# Patient Record
Sex: FEMALE | Race: WHITE | NOT HISPANIC OR LATINO | Employment: UNEMPLOYED | URBAN - METROPOLITAN AREA
[De-identification: names, ages, dates, MRNs, and addresses within clinical notes are randomized per-mention and may not be internally consistent; named-entity substitution may affect disease eponyms.]

---

## 2023-06-28 ENCOUNTER — HOSPITAL ENCOUNTER (INPATIENT)
Facility: HOSPITAL | Age: 58
LOS: 9 days | Discharge: NURSING, REHAB OR LONG-TERM CARE | DRG: 353 | End: 2023-07-07
Attending: EMERGENCY MEDICINE | Admitting: SURGERY
Payer: MEDICAID

## 2023-06-28 ENCOUNTER — ANESTHESIA (OUTPATIENT)
Dept: SURGERY | Facility: HOSPITAL | Age: 58
DRG: 353 | End: 2023-06-28
Payer: MEDICAID

## 2023-06-28 ENCOUNTER — ANESTHESIA EVENT (OUTPATIENT)
Dept: SURGERY | Facility: HOSPITAL | Age: 58
DRG: 353 | End: 2023-06-28
Payer: MEDICAID

## 2023-06-28 DIAGNOSIS — R07.9 CHEST PAIN: ICD-10-CM

## 2023-06-28 DIAGNOSIS — K42.0 INCARCERATED UMBILICAL HERNIA: ICD-10-CM

## 2023-06-28 DIAGNOSIS — F41.9 ANXIETY: ICD-10-CM

## 2023-06-28 DIAGNOSIS — N28.9 ACUTE RENAL INSUFFICIENCY: Primary | ICD-10-CM

## 2023-06-28 DIAGNOSIS — K43.6 INCARCERATED VENTRAL HERNIA: ICD-10-CM

## 2023-06-28 DIAGNOSIS — J69.0 ASPIRATION PNEUMONIA OF BOTH LOWER LOBES DUE TO GASTRIC SECRETIONS: ICD-10-CM

## 2023-06-28 LAB
ABO + RH BLD: NORMAL
ALBUMIN SERPL BCP-MCNC: 3.1 G/DL (ref 3.5–5.2)
ALP SERPL-CCNC: 133 U/L (ref 55–135)
ALT SERPL W/O P-5'-P-CCNC: 43 U/L (ref 10–44)
ANION GAP SERPL CALC-SCNC: 19 MMOL/L (ref 8–16)
APTT PPP: 24.8 SEC (ref 21–32)
AST SERPL-CCNC: 26 U/L (ref 10–40)
BASOPHILS # BLD AUTO: 0.06 K/UL (ref 0–0.2)
BASOPHILS NFR BLD: 0.4 % (ref 0–1.9)
BILIRUB SERPL-MCNC: 1 MG/DL (ref 0.1–1)
BLD GP AB SCN CELLS X3 SERPL QL: NORMAL
BUN SERPL-MCNC: 50 MG/DL (ref 6–20)
CALCIUM SERPL-MCNC: 10.6 MG/DL (ref 8.7–10.5)
CHLORIDE SERPL-SCNC: 91 MMOL/L (ref 95–110)
CO2 SERPL-SCNC: 22 MMOL/L (ref 23–29)
CREAT SERPL-MCNC: 1.5 MG/DL (ref 0.5–1.4)
DIFFERENTIAL METHOD: ABNORMAL
EOSINOPHIL # BLD AUTO: 0.1 K/UL (ref 0–0.5)
EOSINOPHIL NFR BLD: 0.4 % (ref 0–8)
ERYTHROCYTE [DISTWIDTH] IN BLOOD BY AUTOMATED COUNT: 17 % (ref 11.5–14.5)
EST. GFR  (NO RACE VARIABLE): 40 ML/MIN/1.73 M^2
GLUCOSE SERPL-MCNC: 141 MG/DL (ref 70–110)
HCT VFR BLD AUTO: 51.9 % (ref 37–48.5)
HGB BLD-MCNC: 17.1 G/DL (ref 12–16)
IMM GRANULOCYTES # BLD AUTO: 0.06 K/UL (ref 0–0.04)
IMM GRANULOCYTES NFR BLD AUTO: 0.4 % (ref 0–0.5)
INR PPP: 1.1 (ref 0.8–1.2)
LACTATE SERPL-SCNC: 1.6 MMOL/L (ref 0.5–2.2)
LIPASE SERPL-CCNC: 19 U/L (ref 4–60)
LYMPHOCYTES # BLD AUTO: 2.2 K/UL (ref 1–4.8)
LYMPHOCYTES NFR BLD: 15.7 % (ref 18–48)
MCH RBC QN AUTO: 26.4 PG (ref 27–31)
MCHC RBC AUTO-ENTMCNC: 32.9 G/DL (ref 32–36)
MCV RBC AUTO: 80 FL (ref 82–98)
MONOCYTES # BLD AUTO: 1.1 K/UL (ref 0.3–1)
MONOCYTES NFR BLD: 7.8 % (ref 4–15)
NEUTROPHILS # BLD AUTO: 10.4 K/UL (ref 1.8–7.7)
NEUTROPHILS NFR BLD: 75.3 % (ref 38–73)
NRBC BLD-RTO: 0 /100 WBC
PLATELET # BLD AUTO: 486 K/UL (ref 150–450)
PMV BLD AUTO: 8.8 FL (ref 9.2–12.9)
POTASSIUM SERPL-SCNC: 4.4 MMOL/L (ref 3.5–5.1)
PROT SERPL-MCNC: 8 G/DL (ref 6–8.4)
PROTHROMBIN TIME: 11.8 SEC (ref 9–12.5)
RBC # BLD AUTO: 6.48 M/UL (ref 4–5.4)
SODIUM SERPL-SCNC: 132 MMOL/L (ref 136–145)
SPECIMEN OUTDATE: NORMAL
WBC # BLD AUTO: 13.84 K/UL (ref 3.9–12.7)

## 2023-06-28 PROCEDURE — 85730 THROMBOPLASTIN TIME PARTIAL: CPT | Performed by: EMERGENCY MEDICINE

## 2023-06-28 PROCEDURE — 49594 PR REPAIR ANT ABD HERNIA INITIAL 3-10 CM INCARC/STRANG: ICD-10-PCS | Mod: ,,, | Performed by: SURGERY

## 2023-06-28 PROCEDURE — 93010 EKG 12-LEAD: ICD-10-PCS | Mod: ,,, | Performed by: INTERNAL MEDICINE

## 2023-06-28 PROCEDURE — C1729 CATH, DRAINAGE: HCPCS | Performed by: SURGERY

## 2023-06-28 PROCEDURE — 85610 PROTHROMBIN TIME: CPT | Performed by: EMERGENCY MEDICINE

## 2023-06-28 PROCEDURE — 99285 EMERGENCY DEPT VISIT HI MDM: CPT | Mod: 25

## 2023-06-28 PROCEDURE — 99900035 HC TECH TIME PER 15 MIN (STAT)

## 2023-06-28 PROCEDURE — 99223 1ST HOSP IP/OBS HIGH 75: CPT | Mod: 57,,, | Performed by: SURGERY

## 2023-06-28 PROCEDURE — 80053 COMPREHEN METABOLIC PANEL: CPT | Performed by: EMERGENCY MEDICINE

## 2023-06-28 PROCEDURE — 25000003 PHARM REV CODE 250: Performed by: EMERGENCY MEDICINE

## 2023-06-28 PROCEDURE — 96361 HYDRATE IV INFUSION ADD-ON: CPT

## 2023-06-28 PROCEDURE — 36415 COLL VENOUS BLD VENIPUNCTURE: CPT | Performed by: SURGERY

## 2023-06-28 PROCEDURE — 83690 ASSAY OF LIPASE: CPT | Performed by: EMERGENCY MEDICINE

## 2023-06-28 PROCEDURE — 63600175 PHARM REV CODE 636 W HCPCS: Mod: JG

## 2023-06-28 PROCEDURE — 63600175 PHARM REV CODE 636 W HCPCS: Performed by: ANESTHESIOLOGY

## 2023-06-28 PROCEDURE — 71000033 HC RECOVERY, INTIAL HOUR: Performed by: SURGERY

## 2023-06-28 PROCEDURE — 88307 TISSUE EXAM BY PATHOLOGIST: CPT | Mod: 26,,, | Performed by: PATHOLOGY

## 2023-06-28 PROCEDURE — 25000003 PHARM REV CODE 250: Performed by: SURGERY

## 2023-06-28 PROCEDURE — 37000009 HC ANESTHESIA EA ADD 15 MINS: Performed by: SURGERY

## 2023-06-28 PROCEDURE — 96375 TX/PRO/DX INJ NEW DRUG ADDON: CPT

## 2023-06-28 PROCEDURE — 49594 RPR AA HRN 1ST 3-10 NCR/STRN: CPT | Mod: ,,, | Performed by: SURGERY

## 2023-06-28 PROCEDURE — 37000008 HC ANESTHESIA 1ST 15 MINUTES: Performed by: SURGERY

## 2023-06-28 PROCEDURE — 21400001 HC TELEMETRY ROOM

## 2023-06-28 PROCEDURE — 93005 ELECTROCARDIOGRAM TRACING: CPT

## 2023-06-28 PROCEDURE — 36000709 HC OR TIME LEV III EA ADD 15 MIN: Performed by: SURGERY

## 2023-06-28 PROCEDURE — 86900 BLOOD TYPING SEROLOGIC ABO: CPT | Performed by: EMERGENCY MEDICINE

## 2023-06-28 PROCEDURE — 93010 ELECTROCARDIOGRAM REPORT: CPT | Mod: ,,, | Performed by: INTERNAL MEDICINE

## 2023-06-28 PROCEDURE — 88307 PR  SURG PATH,LEVEL V: ICD-10-PCS | Mod: 26,,, | Performed by: PATHOLOGY

## 2023-06-28 PROCEDURE — 85025 COMPLETE CBC W/AUTO DIFF WBC: CPT | Performed by: EMERGENCY MEDICINE

## 2023-06-28 PROCEDURE — 88307 TISSUE EXAM BY PATHOLOGIST: CPT | Performed by: PATHOLOGY

## 2023-06-28 PROCEDURE — C1781 MESH (IMPLANTABLE): HCPCS | Performed by: SURGERY

## 2023-06-28 PROCEDURE — 63600175 PHARM REV CODE 636 W HCPCS: Performed by: SURGERY

## 2023-06-28 PROCEDURE — 96374 THER/PROPH/DIAG INJ IV PUSH: CPT

## 2023-06-28 PROCEDURE — 27201423 OPTIME MED/SURG SUP & DEVICES STERILE SUPPLY: Performed by: SURGERY

## 2023-06-28 PROCEDURE — P9045 ALBUMIN (HUMAN), 5%, 250 ML: HCPCS | Mod: JG

## 2023-06-28 PROCEDURE — 99223 PR INITIAL HOSPITAL CARE,LEVL III: ICD-10-PCS | Mod: 57,,, | Performed by: SURGERY

## 2023-06-28 PROCEDURE — 83605 ASSAY OF LACTIC ACID: CPT | Performed by: EMERGENCY MEDICINE

## 2023-06-28 PROCEDURE — 27000221 HC OXYGEN, UP TO 24 HOURS

## 2023-06-28 PROCEDURE — 25000003 PHARM REV CODE 250

## 2023-06-28 PROCEDURE — 63600175 PHARM REV CODE 636 W HCPCS: Performed by: EMERGENCY MEDICINE

## 2023-06-28 PROCEDURE — 36000708 HC OR TIME LEV III 1ST 15 MIN: Performed by: SURGERY

## 2023-06-28 PROCEDURE — 94761 N-INVAS EAR/PLS OXIMETRY MLT: CPT

## 2023-06-28 DEVICE — MESH VENTRALIGHT ST 4 X 6: Type: IMPLANTABLE DEVICE | Site: UMBILICAL | Status: FUNCTIONAL

## 2023-06-28 RX ORDER — KETOROLAC TROMETHAMINE 30 MG/ML
15 INJECTION, SOLUTION INTRAMUSCULAR; INTRAVENOUS EVERY 8 HOURS PRN
Status: DISCONTINUED | OUTPATIENT
Start: 2023-06-28 | End: 2023-06-28 | Stop reason: HOSPADM

## 2023-06-28 RX ORDER — HYDROMORPHONE HYDROCHLORIDE 2 MG/ML
1 INJECTION, SOLUTION INTRAMUSCULAR; INTRAVENOUS; SUBCUTANEOUS
Status: COMPLETED | OUTPATIENT
Start: 2023-06-28 | End: 2023-06-28

## 2023-06-28 RX ORDER — TIZANIDINE 4 MG/1
4 TABLET ORAL EVERY 8 HOURS
Status: DISCONTINUED | OUTPATIENT
Start: 2023-06-28 | End: 2023-06-30

## 2023-06-28 RX ORDER — OXYCODONE HYDROCHLORIDE 5 MG/1
10 TABLET ORAL EVERY 6 HOURS PRN
Status: DISCONTINUED | OUTPATIENT
Start: 2023-06-28 | End: 2023-06-29

## 2023-06-28 RX ORDER — ACETAMINOPHEN 500 MG
1000 TABLET ORAL EVERY 8 HOURS
Status: DISPENSED | OUTPATIENT
Start: 2023-06-28 | End: 2023-06-30

## 2023-06-28 RX ORDER — DEXAMETHASONE SODIUM PHOSPHATE 4 MG/ML
INJECTION, SOLUTION INTRA-ARTICULAR; INTRALESIONAL; INTRAMUSCULAR; INTRAVENOUS; SOFT TISSUE
Status: DISCONTINUED | OUTPATIENT
Start: 2023-06-28 | End: 2023-06-28

## 2023-06-28 RX ORDER — HYDROMORPHONE HYDROCHLORIDE 2 MG/ML
1 INJECTION, SOLUTION INTRAMUSCULAR; INTRAVENOUS; SUBCUTANEOUS EVERY 4 HOURS PRN
Status: DISCONTINUED | OUTPATIENT
Start: 2023-06-28 | End: 2023-06-28

## 2023-06-28 RX ORDER — ALBUMIN HUMAN 50 G/1000ML
SOLUTION INTRAVENOUS CONTINUOUS PRN
Status: DISCONTINUED | OUTPATIENT
Start: 2023-06-28 | End: 2023-06-28

## 2023-06-28 RX ORDER — ROCURONIUM BROMIDE 10 MG/ML
INJECTION, SOLUTION INTRAVENOUS
Status: DISCONTINUED | OUTPATIENT
Start: 2023-06-28 | End: 2023-06-28

## 2023-06-28 RX ORDER — FLUOXETINE HYDROCHLORIDE 20 MG/1
40 CAPSULE ORAL DAILY
Status: DISCONTINUED | OUTPATIENT
Start: 2023-06-28 | End: 2023-07-07 | Stop reason: HOSPADM

## 2023-06-28 RX ORDER — ALPRAZOLAM 0.25 MG/1
0.25 TABLET ORAL 3 TIMES DAILY
Status: DISCONTINUED | OUTPATIENT
Start: 2023-06-28 | End: 2023-07-07 | Stop reason: HOSPADM

## 2023-06-28 RX ORDER — SODIUM CHLORIDE, SODIUM LACTATE, POTASSIUM CHLORIDE, CALCIUM CHLORIDE 600; 310; 30; 20 MG/100ML; MG/100ML; MG/100ML; MG/100ML
INJECTION, SOLUTION INTRAVENOUS CONTINUOUS
Status: DISCONTINUED | OUTPATIENT
Start: 2023-06-28 | End: 2023-06-30

## 2023-06-28 RX ORDER — BUPIVACAINE HYDROCHLORIDE 2.5 MG/ML
INJECTION, SOLUTION EPIDURAL; INFILTRATION; INTRACAUDAL
Status: DISCONTINUED | OUTPATIENT
Start: 2023-06-28 | End: 2023-06-28 | Stop reason: HOSPADM

## 2023-06-28 RX ORDER — METOCLOPRAMIDE HYDROCHLORIDE 5 MG/ML
5 INJECTION INTRAMUSCULAR; INTRAVENOUS EVERY 6 HOURS PRN
Status: DISCONTINUED | OUTPATIENT
Start: 2023-06-28 | End: 2023-07-06

## 2023-06-28 RX ORDER — ONDANSETRON 2 MG/ML
4 INJECTION INTRAMUSCULAR; INTRAVENOUS
Status: COMPLETED | OUTPATIENT
Start: 2023-06-28 | End: 2023-06-28

## 2023-06-28 RX ORDER — DIPHENHYDRAMINE HCL 25 MG
25 CAPSULE ORAL EVERY 6 HOURS PRN
Status: DISCONTINUED | OUTPATIENT
Start: 2023-06-28 | End: 2023-07-07 | Stop reason: HOSPADM

## 2023-06-28 RX ORDER — KETAMINE HCL IN 0.9 % NACL 50 MG/5 ML
SYRINGE (ML) INTRAVENOUS
Status: DISCONTINUED | OUTPATIENT
Start: 2023-06-28 | End: 2023-06-28

## 2023-06-28 RX ORDER — ONDANSETRON 8 MG/1
8 TABLET, ORALLY DISINTEGRATING ORAL EVERY 8 HOURS PRN
Status: DISCONTINUED | OUTPATIENT
Start: 2023-06-28 | End: 2023-06-30

## 2023-06-28 RX ORDER — MONTELUKAST SODIUM 10 MG/1
10 TABLET ORAL NIGHTLY
Status: DISCONTINUED | OUTPATIENT
Start: 2023-06-28 | End: 2023-07-07 | Stop reason: HOSPADM

## 2023-06-28 RX ORDER — LIDOCAINE HYDROCHLORIDE 20 MG/ML
INJECTION, SOLUTION EPIDURAL; INFILTRATION; INTRACAUDAL; PERINEURAL
Status: DISCONTINUED | OUTPATIENT
Start: 2023-06-28 | End: 2023-06-28

## 2023-06-28 RX ORDER — SUCCINYLCHOLINE CHLORIDE 20 MG/ML
INJECTION INTRAMUSCULAR; INTRAVENOUS
Status: DISCONTINUED | OUTPATIENT
Start: 2023-06-28 | End: 2023-06-28

## 2023-06-28 RX ORDER — HYDROMORPHONE HYDROCHLORIDE 1 MG/ML
1 INJECTION, SOLUTION INTRAMUSCULAR; INTRAVENOUS; SUBCUTANEOUS EVERY 4 HOURS PRN
Status: DISCONTINUED | OUTPATIENT
Start: 2023-06-28 | End: 2023-06-29

## 2023-06-28 RX ORDER — SODIUM CHLORIDE 9 MG/ML
1000 INJECTION, SOLUTION INTRAVENOUS
Status: COMPLETED | OUTPATIENT
Start: 2023-06-28 | End: 2023-06-28

## 2023-06-28 RX ORDER — CEFAZOLIN SODIUM 1 G/3ML
INJECTION, POWDER, FOR SOLUTION INTRAMUSCULAR; INTRAVENOUS
Status: DISCONTINUED | OUTPATIENT
Start: 2023-06-28 | End: 2023-06-28

## 2023-06-28 RX ORDER — MIDAZOLAM HYDROCHLORIDE 1 MG/ML
INJECTION INTRAMUSCULAR; INTRAVENOUS
Status: DISCONTINUED | OUTPATIENT
Start: 2023-06-28 | End: 2023-06-28

## 2023-06-28 RX ORDER — HYDROMORPHONE HYDROCHLORIDE 2 MG/ML
0.2 INJECTION, SOLUTION INTRAMUSCULAR; INTRAVENOUS; SUBCUTANEOUS EVERY 5 MIN PRN
Status: DISCONTINUED | OUTPATIENT
Start: 2023-06-28 | End: 2023-06-28 | Stop reason: HOSPADM

## 2023-06-28 RX ORDER — FENTANYL CITRATE 50 UG/ML
INJECTION, SOLUTION INTRAMUSCULAR; INTRAVENOUS
Status: DISCONTINUED | OUTPATIENT
Start: 2023-06-28 | End: 2023-06-28

## 2023-06-28 RX ORDER — PHENYLEPHRINE HYDROCHLORIDE 10 MG/ML
INJECTION INTRAVENOUS
Status: DISCONTINUED | OUTPATIENT
Start: 2023-06-28 | End: 2023-06-28

## 2023-06-28 RX ORDER — CHLORHEXIDINE GLUCONATE ORAL RINSE 1.2 MG/ML
10 SOLUTION DENTAL 2 TIMES DAILY
Status: COMPLETED | OUTPATIENT
Start: 2023-06-28 | End: 2023-07-03

## 2023-06-28 RX ORDER — FAMOTIDINE 20 MG/1
20 TABLET, FILM COATED ORAL DAILY
Status: DISCONTINUED | OUTPATIENT
Start: 2023-06-28 | End: 2023-06-29

## 2023-06-28 RX ORDER — PROPOFOL 10 MG/ML
VIAL (ML) INTRAVENOUS
Status: DISCONTINUED | OUTPATIENT
Start: 2023-06-28 | End: 2023-06-28

## 2023-06-28 RX ORDER — ONDANSETRON 2 MG/ML
4 INJECTION INTRAMUSCULAR; INTRAVENOUS DAILY PRN
Status: DISCONTINUED | OUTPATIENT
Start: 2023-06-28 | End: 2023-06-28 | Stop reason: HOSPADM

## 2023-06-28 RX ORDER — ENOXAPARIN SODIUM 100 MG/ML
40 INJECTION SUBCUTANEOUS EVERY 24 HOURS
Status: DISCONTINUED | OUTPATIENT
Start: 2023-06-29 | End: 2023-06-29

## 2023-06-28 RX ORDER — ONDANSETRON HYDROCHLORIDE 2 MG/ML
INJECTION, SOLUTION INTRAMUSCULAR; INTRAVENOUS
Status: DISCONTINUED | OUTPATIENT
Start: 2023-06-28 | End: 2023-06-28

## 2023-06-28 RX ADMIN — HYDROMORPHONE HYDROCHLORIDE 1 MG: 1 INJECTION, SOLUTION INTRAMUSCULAR; INTRAVENOUS; SUBCUTANEOUS at 08:06

## 2023-06-28 RX ADMIN — PHENYLEPHRINE HYDROCHLORIDE 100 MCG: 10 INJECTION INTRAVENOUS at 12:06

## 2023-06-28 RX ADMIN — HYDROMORPHONE HYDROCHLORIDE 1 MG: 1 INJECTION, SOLUTION INTRAMUSCULAR; INTRAVENOUS; SUBCUTANEOUS at 04:06

## 2023-06-28 RX ADMIN — HYDROMORPHONE HYDROCHLORIDE 0.2 MG: 2 INJECTION INTRAMUSCULAR; INTRAVENOUS; SUBCUTANEOUS at 02:06

## 2023-06-28 RX ADMIN — ONDANSETRON 4 MG: 2 INJECTION INTRAMUSCULAR; INTRAVENOUS at 11:06

## 2023-06-28 RX ADMIN — FENTANYL CITRATE 50 MCG: 0.05 INJECTION, SOLUTION INTRAMUSCULAR; INTRAVENOUS at 12:06

## 2023-06-28 RX ADMIN — SODIUM CHLORIDE 1000 ML: 9 INJECTION, SOLUTION INTRAVENOUS at 09:06

## 2023-06-28 RX ADMIN — SUGAMMADEX 200 MG: 100 INJECTION, SOLUTION INTRAVENOUS at 12:06

## 2023-06-28 RX ADMIN — ALBUMIN HUMAN: 50 SOLUTION INTRAVENOUS at 12:06

## 2023-06-28 RX ADMIN — TIZANIDINE 4 MG: 4 TABLET ORAL at 10:06

## 2023-06-28 RX ADMIN — ROCURONIUM BROMIDE 30 MG: 10 SOLUTION INTRAVENOUS at 11:06

## 2023-06-28 RX ADMIN — ROCURONIUM BROMIDE 20 MG: 10 SOLUTION INTRAVENOUS at 12:06

## 2023-06-28 RX ADMIN — LIDOCAINE HYDROCHLORIDE 100 MG: 20 INJECTION, SOLUTION EPIDURAL; INFILTRATION; INTRACAUDAL; PERINEURAL at 11:06

## 2023-06-28 RX ADMIN — FENTANYL CITRATE 50 MCG: 0.05 INJECTION, SOLUTION INTRAMUSCULAR; INTRAVENOUS at 11:06

## 2023-06-28 RX ADMIN — ALPRAZOLAM 0.25 MG: 0.25 TABLET ORAL at 04:06

## 2023-06-28 RX ADMIN — SODIUM CHLORIDE, POTASSIUM CHLORIDE, SODIUM LACTATE AND CALCIUM CHLORIDE: 600; 310; 30; 20 INJECTION, SOLUTION INTRAVENOUS at 11:06

## 2023-06-28 RX ADMIN — ACETAMINOPHEN 1000 MG: 500 TABLET ORAL at 10:06

## 2023-06-28 RX ADMIN — PHENYLEPHRINE HYDROCHLORIDE 100 MCG: 10 INJECTION INTRAVENOUS at 11:06

## 2023-06-28 RX ADMIN — HYDROMORPHONE HYDROCHLORIDE 0.2 MG: 2 INJECTION INTRAMUSCULAR; INTRAVENOUS; SUBCUTANEOUS at 01:06

## 2023-06-28 RX ADMIN — MONTELUKAST 10 MG: 10 TABLET, FILM COATED ORAL at 08:06

## 2023-06-28 RX ADMIN — PROPOFOL 150 MG: 10 INJECTION, EMULSION INTRAVENOUS at 11:06

## 2023-06-28 RX ADMIN — PHENYLEPHRINE HYDROCHLORIDE 200 MCG: 10 INJECTION INTRAVENOUS at 11:06

## 2023-06-28 RX ADMIN — ONDANSETRON 4 MG: 2 INJECTION INTRAMUSCULAR; INTRAVENOUS at 09:06

## 2023-06-28 RX ADMIN — Medication 25 MG: at 12:06

## 2023-06-28 RX ADMIN — ALPRAZOLAM 0.25 MG: 0.25 TABLET ORAL at 08:06

## 2023-06-28 RX ADMIN — HYDROMORPHONE HYDROCHLORIDE 1 MG: 2 INJECTION INTRAMUSCULAR; INTRAVENOUS; SUBCUTANEOUS at 09:06

## 2023-06-28 RX ADMIN — ONDANSETRON 4 MG: 2 INJECTION INTRAMUSCULAR; INTRAVENOUS at 01:06

## 2023-06-28 RX ADMIN — MIDAZOLAM HYDROCHLORIDE 2 MG: 1 INJECTION, SOLUTION INTRAMUSCULAR; INTRAVENOUS at 11:06

## 2023-06-28 RX ADMIN — CHLORHEXIDINE GLUCONATE 0.12% ORAL RINSE 10 ML: 1.2 LIQUID ORAL at 08:06

## 2023-06-28 RX ADMIN — SUCCINYLCHOLINE CHLORIDE 120 MG: 20 INJECTION, SOLUTION INTRAMUSCULAR; INTRAVENOUS; PARENTERAL at 11:06

## 2023-06-28 RX ADMIN — DEXAMETHASONE SODIUM PHOSPHATE 4 MG: 4 INJECTION, SOLUTION INTRA-ARTICULAR; INTRALESIONAL; INTRAMUSCULAR; INTRAVENOUS; SOFT TISSUE at 11:06

## 2023-06-28 RX ADMIN — SODIUM CHLORIDE, POTASSIUM CHLORIDE, SODIUM LACTATE AND CALCIUM CHLORIDE: 600; 310; 30; 20 INJECTION, SOLUTION INTRAVENOUS at 02:06

## 2023-06-28 RX ADMIN — Medication 25 MG: at 11:06

## 2023-06-28 RX ADMIN — CEFAZOLIN 2 G: 330 INJECTION, POWDER, FOR SOLUTION INTRAMUSCULAR; INTRAVENOUS at 12:06

## 2023-06-28 NOTE — ANESTHESIA PROCEDURE NOTES
Intubation    Date/Time: 6/28/2023 11:29 AM  Performed by: Carmelo Marsh  Authorized by: Carmelo Marsh     Intubation:     Induction:  Intravenous    Intubated:  Postinduction    Mask Ventilation:  Easy with oral airway    Attempts:  1    Attempted By:  Student    Method of Intubation:  Direct    Blade:  Phillips 4    Laryngeal View Grade: Grade I - full view of cords      Difficult Airway Encountered?: No      Complications:  None    Airway Device:  Oral endotracheal tube    Airway Device Size:  7.5    Style/Cuff Inflation:  Cuffed (inflated to minimal occlusive pressure)    Inflation Amount (mL):  6    Tube secured:  21    Secured at:  The lips    Placement Verified By:  Capnometry    Complicating Factors:  None    Findings Post-Intubation:  BS equal bilateral

## 2023-06-28 NOTE — ANESTHESIA PREPROCEDURE EVALUATION
06/28/2023  Hamzah Nicolas is a 58 y.o., female.    Patient Active Problem List   Diagnosis    Incarcerated ventral hernia    BMI 35.0-35.9,adult     Past Surgical History:   Procedure Laterality Date    CHOLECYSTECTOMY      HYSTERECTOMY         Pre-op Assessment    I have reviewed the Patient Summary Reports.    I have reviewed the NPO Status.   I have reviewed the Medications.     Review of Systems  Anesthesia Hx:  No problems with previous Anesthesia    Social:  Smoker    Hematology/Oncology:  Hematology Normal        Cardiovascular:   Hypertension ECG has been reviewed.    Pulmonary:  Pulmonary Normal    Renal/:  Renal/ Normal     Hepatic/GI:  Hepatic/GI Normal Incarcerated ventral hernia   Neurological:  Neurology Normal    Endocrine:  Endocrine Normal  Obesity / BMI > 30  Psych:   anxiety          Physical Exam  General: Well nourished    Airway:  Mallampati: IV   Mouth Opening: Small, but > 3cm  TM Distance: Normal  Neck ROM: Normal ROM    Dental:  Intact        Anesthesia Plan  Type of Anesthesia, risks & benefits discussed:    Anesthesia Type: Gen ETT  Intra-op Monitoring Plan: Standard ASA Monitors  Post Op Pain Control Plan: multimodal analgesia  Induction:  IV and rapid sequence  Airway Plan: , Post-Induction  Informed Consent: Informed consent signed with the Patient and all parties understand the risks and agree with anesthesia plan.  All questions answered.   ASA Score: 3    Ready For Surgery From Anesthesia Perspective.     .      Lab Results   Component Value Date    WBC 13.84 (H) 06/28/2023    HGB 17.1 (H) 06/28/2023    HCT 51.9 (H) 06/28/2023    MCV 80 (L) 06/28/2023     (H) 06/28/2023         Sinus rhythm with short KY   Otherwise normal ECG   When compared with ECG of 02-AUG-2013 14:27,   KY interval has decreased   Confirmed by LEFTY ANGUIANO MD ([id number]) on 8/3/2013  5:56:23 PM

## 2023-06-28 NOTE — PLAN OF CARE
Patient updated on plan of care. Fall  and security precautions in place. Vitals every 4 hours.  Patient remains free from falls. Education provided; questions encouraged and answered. Pt receiving fluids, arias care done, osvaldo drain in tact, pain management continued.

## 2023-06-28 NOTE — H&P
[facility] - Emergency Dept.  General Surgery  History & Physical    Patient Name: Hamzah Nicolas  MRN: [id number]  Admission Date: 6/28/2023  Attending Physician: Marcus Hutchinson MD   Primary Care Provider: Lali Hatch MD    Patient information was obtained from patient and ER records.     Subjective:     Chief Complaint/Reason for Admission:  Abdominal pain/incarcerated ventral hernia with concerns for strangulated bowel or omentum    History of Present Illness: 58-year-old female with a 5 day history of bulging at the umbilicus.  The area has become red and tender.  The pain increased and she presented to the emergency room.  She had associated nausea and vomiting She has not had anything to eat and drink in a few days.  She denies any fever or chills.  She is not on any anticoagulation medicines.      Patient was evaluated in the emergency room and a urgent surgical consult was obtained.      No current facility-administered medications on file prior to encounter.     Current Outpatient Medications on File Prior to Encounter   Medication Sig    alprazolam (XANAX) 0.25 MG tablet Take 0.25 mg by mouth 3 (three) times daily.    cetirizine (ZYRTEC) 10 MG tablet Take 10 mg by mouth once daily.    FLUOXETINE HCL (PROZAC ORAL) Take 60 mg by mouth once daily.    MELOXICAM (MOBIC ORAL) Take by mouth.    montelukast (SINGULAIR) 10 mg tablet Take 10 mg by mouth every evening.    TIZANIDINE HCL (ZANAFLEX ORAL) Take by mouth.    tramadol (ULTRAM) 50 mg tablet Take 50 mg by mouth every 6 (six) hours as needed.    diclofenac (VOLTAREN) 50 MG EC tablet Take 1 tablet (50 mg total) by mouth 2 (two) times daily as needed.    gabapentin (NEURONTIN) 600 MG tablet Take 600 mg by mouth 3 (three) times daily.       Review of patient's allergies indicates:   Allergen Reactions    Opioids - morphine analogues        Past Medical History:   Diagnosis Date    Anxiety     Back pain     Depression      Past Surgical History:    Procedure Laterality Date    CHOLECYSTECTOMY      HYSTERECTOMY       Family History    None       Tobacco Use    Smoking status: Every Day     Packs/day: 0.50     Types: Cigarettes    Smokeless tobacco: Never   Substance and Sexual Activity    Alcohol use: No    Drug use: No    Sexual activity: Not on file     Review of Systems   Constitutional:  Negative for appetite change, chills, fatigue, fever and unexpected weight change.   HENT:  Negative for hearing loss and rhinorrhea.    Eyes:  Negative for visual disturbance.   Respiratory:  Negative for apnea, cough, shortness of breath and wheezing.    Cardiovascular:  Negative for chest pain and palpitations.   Gastrointestinal:  Positive for abdominal pain, nausea and vomiting. Negative for abdominal distention, blood in stool, constipation and diarrhea.        Painful bulge just above the umbilicus   Genitourinary:  Negative for dysuria, frequency and urgency.   Musculoskeletal:  Negative for arthralgias and neck pain.   Skin:  Negative for rash.   Neurological:  Negative for seizures, weakness, numbness and headaches.   Hematological:  Negative for adenopathy. Does not bruise/bleed easily.   Psychiatric/Behavioral:  Negative for hallucinations. The patient is not nervous/anxious.    All other systems reviewed and are negative.  Objective:     Vital Signs (Most Recent):  Temp: 97.9 °F (36.6 °C) (06/28/23 0851)  Pulse: 101 (06/28/23 0948)  Resp: 17 (06/28/23 0948)  BP: 113/73 (06/28/23 0948)  SpO2: (!) 93 % (06/28/23 0948) Vital Signs (24h Range):  Temp:  [97.9 °F (36.6 °C)] 97.9 °F (36.6 °C)  Pulse:  [] 101  Resp:  [17-24] 17  SpO2:  [93 %-96 %] 93 %  BP: (113-120)/(73-86) 113/73     Weight: 90.7 kg (200 lb) (bed scale broken )  Body mass index is 35.43 kg/m².     Physical Exam  Vitals and nursing note reviewed.   Constitutional:       Appearance: She is well-developed. She is obese. She is ill-appearing.   HENT:      Head: Normocephalic.   Eyes:       Pupils: Pupils are equal, round, and reactive to light.   Neck:      Thyroid: No thyromegaly.      Vascular: No JVD.      Trachea: No tracheal deviation.   Cardiovascular:      Rate and Rhythm: Normal rate and regular rhythm.      Heart sounds: Normal heart sounds.   Pulmonary:      Breath sounds: Normal breath sounds. No wheezing.   Abdominal:      General: Bowel sounds are normal. There is no distension.      Palpations: Abdomen is soft. Abdomen is not rigid. There is no mass.      Tenderness: Tenderness: at the non reducible mass. There is no guarding or rebound.      Hernia: A hernia (10 cm mass above the umbilicus, non reducible, skin erythema) is present.      Comments: Obese   Musculoskeletal:         General: Normal range of motion.   Lymphadenopathy:      Cervical: No cervical adenopathy.   Skin:     General: Skin is warm and dry.      Findings: No erythema or rash.   Neurological:      Mental Status: She is oriented to person, place, and time.          I have reviewed all pertinent lab results within the past 24 hours.  CBC:   Recent Labs   Lab 06/28/23  0939   WBC 13.84*   RBC 6.48*   HGB 17.1*   HCT 51.9*   *   MCV 80*   MCH 26.4*   MCHC 32.9     TININE, ALKPHOS, ALT, AST, BILITOT in the last 168 hours.    Additional labs are pending and will reviewed.   EKG shows normal sinus rhythm    Significant Diagnostics:  I have reviewed all pertinent imaging results/findings within the past 24 hours.  None      Assessment/Plan:     BMI 35.0-35.9,adult  Increases the risk of recurrence and wound complications    Incarcerated ventral hernia  Patient has an incarcerated ventral hernia, I suspect this is omentum given the 5 day history.  However bowel incarceration with vascular compromise needs to be considered.      Patient needs an urgent open hernia repair.  She would need a partial resection of the omentum versus a partial bowel resection.  The hernia then would be repaired with mesh either synthetic or  biologic depending on the findings.      Need for surgery risks benefits and complications were discussed with the patient.  Risks include infection, bleeding, hematoma, seroma, recurrence of the hernia, and anastomotic leak or stricture if bowel resection and anastomosis is required.      Questions were answered      VTE Risk Mitigation (From admission, onward)    None          Marcus Hutchinson MD  General Surgery  [facility] - Emergency Dept.

## 2023-06-28 NOTE — PLAN OF CARE
Patient had a ring on her right middle finger that was very tight and she was unable to remove it.  After she arrived into the operating room and was induced by anesthesia, the ring was cut off.  It was placed in a ziploc bag with a patient label and stapled onto the chart.

## 2023-06-28 NOTE — ASSESSMENT & PLAN NOTE
Patient has an incarcerated ventral hernia, I suspect this is omentum given the 5 day history.  However bowel incarceration with vascular compromise needs to be considered.      Patient needs an urgent open hernia repair.  She would need a partial resection of the omentum versus a partial bowel resection.  The hernia then would be repaired with mesh either synthetic or biologic depending on the findings.      Need for surgery risks benefits and complications were discussed with the patient.  Risks include infection, bleeding, hematoma, seroma, recurrence of the hernia, and anastomotic leak or stricture if bowel resection and anastomosis is required.      Questions were answered

## 2023-06-28 NOTE — ANESTHESIA POSTPROCEDURE EVALUATION
Anesthesia Post Evaluation    Patient: Hamzah Nicolas    Procedure(s) Performed: Procedure(s) (LRB):  REPAIR, HERNIA, UMBILICAL, INCARCERATED, AGE 5 YEARS OR OLDER (N/A)  OMENTECTOMY (N/A)    Final Anesthesia Type: general      Patient location during evaluation: PACU  Patient participation: Yes- Able to Participate  Level of consciousness: awake and alert  Post-procedure vital signs: reviewed and stable  Pain management: adequate  Airway patency: patent  ENOC mitigation strategies: Verification of full reversal of neuromuscular block  PONV status at discharge: No PONV  Anesthetic complications: no      Cardiovascular status: hemodynamically stable  Respiratory status: spontaneous ventilation  Hydration status: euvolemic  Follow-up not needed.          Vitals Value Taken Time   /90 06/28/23 1445   Temp 36.8 °C (98.3 °F) 06/28/23 1445   Pulse 81 06/28/23 1445   Resp 18 06/28/23 1445   SpO2 93 % 06/28/23 1445         Event Time   Out of Recovery 14:25:53         Pain/Ryan Score: Pain Rating Prior to Med Admin: 7 (6/28/2023  2:25 PM)  Ryan Score: 7 (6/28/2023  2:15 PM)

## 2023-06-28 NOTE — OP NOTE
[facility] - Surgery ([facility])  Surgery Department  Operative Note    SUMMARY     Date of Procedure: 6/28/2023     Procedure: Procedure(s) (LRB):  REPAIR, HERNIA, UMBILICAL, INCARCERATED, AGE 5 YEARS OR OLDER (N/A)  OMENTECTOMY (N/A)     Surgeon(s) and Role:     * Marcus Hutchinson MD - Primary    Assisting Surgeon: None    Pre-Operative Diagnosis: Incarcerated umbilical hernia [K42.0]    Post-Operative Diagnosis: Post-Op Diagnosis Codes:     * Incarcerated umbilical hernia [K42.0]    Anesthesia: General    Operative Findings (including complications, if any):       4 cm ventral hernia with incarcerated and strangulated omentum    1 cm umbilical hernia with incarcerated omentum.      Total herniahernia size 5 cm    Description of Technical Procedures:     Patient was brought into the operative room placed on the operative table supine position.  General endotracheal anesthesia was induced.  IV antibiotics were administered.  Pneumatic compression devices on lower extremity.  The patient was cleaned, clipped, prepped and draped in the standard fashion.      A time-out was performed.      A midline incision was made over the area of the incarcerated hernia.  Electrocautery was used to dissect subcutaneous tissues.  The hernia sac was entered.  Blood-tinged fluid was removed.  The omentum was noted to be incarcerated and strangulated.  The omentum was freed from its attachments within the hernia sac.  The hernia sac was enlarged superiorly and the omentum was eviscerated until viable portions of the omentum were identified.      The omentum was excised along the area of demarcation with the large EnSeal bipolar cautery device.      The wound was then irrigated.  Hemostasis was ensured.      The hernia defect was measured and found to be 4 cm in size.    We then  the undersurface of the umbilicus from the fascia and found a 2nd 1 cm umbilical hernia with incarcerated omentum.  The omentum was reduced and the  hernia defects were connected to create 1 large hernia.      The hernia sac was then excised from the subcutaneous tissue.  The fascia was cleared of subcutaneous tissue for proximally 2 cm around the hernia defect.      The hernia was repaired by in laying a 11 cm [place] of Ventralex mesh.  Eight sutures were placed in tagged and then the mesh was parachuted into the wound and secured after confirming the sponge and instrument counts were correct.      The wound was irrigated.  The fascia was closed with 0 PDS in a running fashion.  Marcaine was infiltrated.  The undersurface of the umbilicus was tacked to the fascia.  A 15 [place] Anshul drain was placed through a separate incision and secured.      The deep layers were closed with 2 0 Vicryl interrupted fashion.  The deep dermis with 3-0 Vicryl interrupted fashion.  The skin with 4-0 Monocryl in a subcuticular manner.  Dermabond and a drain sponge covered with a Tegaderm were placed.      Patient tolerated procedure well.  Final sponge and instrument counts were correct    Significant Surgical Tasks Conducted by the Assistant(s), if Applicable:  None    Estimated Blood Loss (EBL):  30 mL   IV fluids 1 L   Marcaine 0.25% 30 mL           Implants:   Implant Name Type Inv. Item Serial No.  Lot No. LRB No. Used Action   MESH VENTRALIGHT ST 4 X 6 - [id number]  MESH VENTRALIGHT ST 4 X 6  [employer] [place] [id number] N/A 1 Implanted       Specimens:   Specimen (24h ago, onward)       Start     Ordered    06/28/23 1303  Specimen to Pathology, Surgery General Surgery  Once        Comments: Pre-op Diagnosis: Incarcerated umbilical hernia [K42.0]Procedure(s):REPAIR, HERNIA, UMBILICAL, INCARCERATED, AGE 5 YEARS OR OLDEROMENTECTOMY Number of specimens: 1Name of specimens: 1. Segment of omentum - perm     References:    Click here for ordering Quick Tip   Question Answer Comment   Procedure Type: General Surgery    Specimen Class: Routine/Screening    Which provider would you  like to cc? LASHAE JIMENEZ    Release to patient Immediate        06/28/23 9393                            Condition: Stable    Disposition: PACU - hemodynamically stable.    Attestation: I performed the procedure.

## 2023-06-28 NOTE — HPI
58-year-old female with a 5 day history of bulging at the umbilicus.  The area has become red and tender.  The pain increased and she presented to the emergency room.  She had associated nausea and vomiting She has not had anything to eat and drink in a few days.  She denies any fever or chills.  She is not on any anticoagulation medicines.      Patient was evaluated in the emergency room and a urgent surgical consult was obtained.

## 2023-06-28 NOTE — TRANSFER OF CARE
"Anesthesia Transfer of Care Note    Patient: Hamzah Nicolas    Procedure(s) Performed: Procedure(s) (LRB):  REPAIR, HERNIA, UMBILICAL, INCARCERATED, AGE 5 YEARS OR OLDER (N/A)  OMENTECTOMY (N/A)    Patient location: PACU    Anesthesia Type: general    Transport from OR: Transported from OR on 6-10 L/min O2 by face mask with adequate spontaneous ventilation    Post pain: adequate analgesia    Post assessment: no apparent anesthetic complications and tolerated procedure well    Post vital signs: stable    Level of consciousness: awake    Nausea/Vomiting: no nausea/vomiting    Complications: none    Transfer of care protocol was followed      Last vitals:   Visit Vitals  /73   Pulse 101   Temp 36.6 °C (97.9 °F) (Oral)   Resp 17   Ht 5' 3" (1.6 m)   Wt 90.7 kg (200 lb)   SpO2 (!) 93%   Breastfeeding No   BMI 35.43 kg/m²     "

## 2023-06-28 NOTE — ED PROVIDER NOTES
SCRIBE #1 NOTE: I, Neville Moran, am scribing for, and in the presence of, Sharron Peterson Do, MD. I have scribed the entire note.       History     Chief Complaint   Patient presents with    Abdominal Pain     Per EMS, patient reports bulging to abdomen with pain, also c/o nausea, vomiting, and SOB due to pain, patient reports increase in size over the past few days     Review of patient's allergies indicates:   Allergen Reactions    Opioids - morphine analogues          History of Present Illness     HPI    6/28/2023, 9:35 AM  History obtained from the patient      History of Present Illness: Hamzah Nicolas is a 58 y.o. female patient with a PMHx of anxiety, depression, HTN, who presents to the Emergency Department for evaluation of abdominal pain which onset gradually 4 days ago. Pt reports bulging to abdomen with pain and an increase in size over the past few days. Symptoms are constant and moderate in severity. No mitigating or exacerbating factors reported. Associated sxs include N/V and SOB. Patient denies any CP, fever, chills, headache, dysuria, and all other sxs at this time. No prior Tx reported. No further complaints or concerns at this time.       Arrival mode:  EMS      PCP: Lali Hatch MD        Past Medical History:  Past Medical History:   Diagnosis Date    Anxiety     Back pain     Depression        Past Surgical History:  Past Surgical History:   Procedure Laterality Date    CHOLECYSTECTOMY      HYSTERECTOMY           Family History:  History reviewed. No pertinent family history.    Social History:  Social History     Tobacco Use    Smoking status: Every Day     Packs/day: 0.50     Types: Cigarettes    Smokeless tobacco: Never   Substance and Sexual Activity    Alcohol use: No    Drug use: No    Sexual activity: Not on file        Review of Systems     Review of Systems   Constitutional:  Negative for chills and fever.   HENT:  Negative for sore throat.    Respiratory:  Positive for  shortness of breath.    Cardiovascular:  Negative for chest pain.   Gastrointestinal:  Positive for abdominal pain, nausea and vomiting.   Genitourinary:  Negative for dysuria.   Musculoskeletal:  Negative for back pain.   Skin:  Negative for rash.   Neurological:  Negative for weakness and headaches.   Hematological:  Does not bruise/bleed easily.   All other systems reviewed and are negative.     Physical Exam     Initial Vitals [06/28/23 0851]   BP Pulse Resp Temp SpO2   120/86 103 (!) 22 97.9 °F (36.6 °C) 96 %      MAP       --          Physical Exam  Nursing Notes and Vital Signs Reviewed.  Constitutional: Patient is in mild distress. Pale. Weak.  Head: Atraumatic. Normocephalic.  Eyes: PERRL. EOM intact. Conjunctivae are not pale. No scleral icterus.  ENT: Mucous membranes are moist. Oropharynx is clear and symmetric.    Neck: Supple. Full ROM. No lymphadenopathy.  Cardiovascular: Regular rate. Regular rhythm. No murmurs, rubs, or gallops. Distal pulses are 2+ and symmetric.  Pulmonary/Chest: No respiratory distress. Clear to auscultation bilaterally. No wheezing or rales.  Abdominal: Very large umbilical hernia that is red and tender to touch. It is hard and not reducible.   Genitourinary: No CVA tenderness  Musculoskeletal: Moves all extremities. No obvious deformities. No edema. No calf tenderness.  Skin: Warm and dry.  Neurological:  Alert, awake, and appropriate.  Normal speech.  No acute focal neurological deficits are appreciated.  Psychiatric: Normal affect. Good eye contact. Appropriate in content.           ED Course   Critical Care    Date/Time: 6/28/2023 10:26 AM  Performed by: Sharron Peterson Do, MD  Authorized by: Sharron Peterson Do, MD   Direct patient critical care time: 10 minutes  Additional history critical care time: 8 minutes  Ordering / reviewing critical care time: 9 minutes  Documentation critical care time: 6 minutes  Consulting other physicians critical care time: 2 minutes  Total  critical care time (exclusive of procedural time) : 35 minutes  Critical care time was exclusive of separately billable procedures and treating other patients and teaching time.  Critical care was necessary to treat or prevent imminent or life-threatening deterioration of the following conditions: Incarcerated/strangulated hernia.  Critical care was time spent personally by me on the following activities: blood draw for specimens, development of treatment plan with patient or surrogate, discussions with consultants, interpretation of cardiac output measurements, evaluation of patient's response to treatment, examination of patient, obtaining history from patient or surrogate, ordering and performing treatments and interventions, ordering and review of laboratory studies, ordering and review of radiographic studies, pulse oximetry, re-evaluation of patient's condition and review of old charts.      ED Vital Signs:  Vitals:    06/28/23 0930 06/28/23 0948 06/28/23 1326 06/28/23 1330   BP:  113/73 (!) 149/81 (!) 162/77   Pulse:  101 86 79   Resp: (!) 24 17 (!) 24 20   Temp:   98 °F (36.7 °C)    TempSrc:   Skin    SpO2:  (!) 93% 98% 98%   Weight:       Height:        06/28/23 1335 06/28/23 1345 06/28/23 1355 06/28/23 1400   BP: (!) 162/81 (!) 154/75  (!) 156/75   Pulse: 85 80  80   Resp: 20 20 20 20   Temp:       TempSrc:       SpO2: 98% 99%  (!) 93%   Weight:       Height:        06/28/23 1405 06/28/23 1410 06/28/23 1415 06/28/23 1420   BP:   (!) 155/78    Pulse:   73    Resp: 20 20 20 20   Temp:       TempSrc:       SpO2:   (!) 94%    Weight:       Height:        06/28/23 1425 06/28/23 1445 06/28/23 1548   BP:  (!) 140/90    Pulse:  81 65   Resp: 18 18    Temp:  98.3 °F (36.8 °C)    TempSrc:      SpO2:  (!) 93%    Weight:      Height:          Abnormal Lab Results:  Labs Reviewed   CBC W/ AUTO DIFFERENTIAL - Abnormal; Notable for the following components:       Result Value    WBC 13.84 (*)     RBC 6.48 (*)      Hemoglobin 17.1 (*)     Hematocrit 51.9 (*)     MCV 80 (*)     MCH 26.4 (*)     RDW 17.0 (*)     Platelets 486 (*)     MPV 8.8 (*)     Gran # (ANC) 10.4 (*)     Immature Grans (Abs) 0.06 (*)     Mono # 1.1 (*)     Gran % 75.3 (*)     Lymph % 15.7 (*)     All other components within normal limits   COMPREHENSIVE METABOLIC PANEL - Abnormal; Notable for the following components:    Sodium 132 (*)     Chloride 91 (*)     CO2 22 (*)     Glucose 141 (*)     BUN 50 (*)     Creatinine 1.5 (*)     Calcium 10.6 (*)     Albumin 3.1 (*)     eGFR 40 (*)     Anion Gap 19 (*)     All other components within normal limits   LIPASE   LACTIC ACID, PLASMA        All Lab Results:  Results for orders placed or performed during the hospital encounter of 06/28/23   CBC auto differential   Result Value Ref Range    WBC 13.84 (H) 3.90 - 12.70 K/uL    RBC 6.48 (H) 4.00 - 5.40 M/uL    Hemoglobin 17.1 (H) 12.0 - 16.0 g/dL    Hematocrit 51.9 (H) 37.0 - 48.5 %    MCV 80 (L) 82 - 98 fL    MCH 26.4 (L) 27.0 - 31.0 pg    MCHC 32.9 32.0 - 36.0 g/dL    RDW 17.0 (H) 11.5 - 14.5 %    Platelets 486 (H) 150 - 450 K/uL    MPV 8.8 (L) 9.2 - 12.9 fL    Immature Granulocytes 0.4 0.0 - 0.5 %    Gran # (ANC) 10.4 (H) 1.8 - 7.7 K/uL    Immature Grans (Abs) 0.06 (H) 0.00 - 0.04 K/uL    Lymph # 2.2 1.0 - 4.8 K/uL    Mono # 1.1 (H) 0.3 - 1.0 K/uL    Eos # 0.1 0.0 - 0.5 K/uL    Baso # 0.06 0.00 - 0.20 K/uL    nRBC 0 0 /100 WBC    Gran % 75.3 (H) 38.0 - 73.0 %    Lymph % 15.7 (L) 18.0 - 48.0 %    Mono % 7.8 4.0 - 15.0 %    Eosinophil % 0.4 0.0 - 8.0 %    Basophil % 0.4 0.0 - 1.9 %    Differential Method Automated    Comprehensive metabolic panel   Result Value Ref Range    Sodium 132 (L) 136 - 145 mmol/L    Potassium 4.4 3.5 - 5.1 mmol/L    Chloride 91 (L) 95 - 110 mmol/L    CO2 22 (L) 23 - 29 mmol/L    Glucose 141 (H) 70 - 110 mg/dL    BUN 50 (H) 6 - 20 mg/dL    Creatinine 1.5 (H) 0.5 - 1.4 mg/dL    Calcium 10.6 (H) 8.7 - 10.5 mg/dL    Total Protein 8.0 6.0 - 8.4  g/dL    Albumin 3.1 (L) 3.5 - 5.2 g/dL    Total Bilirubin 1.0 0.1 - 1.0 mg/dL    Alkaline Phosphatase 133 55 - 135 U/L    AST 26 10 - 40 U/L    ALT 43 10 - 44 U/L    eGFR 40 (A) >60 mL/min/1.73 m^2    Anion Gap 19 (H) 8 - 16 mmol/L   Lipase   Result Value Ref Range    Lipase 19 4 - 60 U/L   Lactic acid, plasma   Result Value Ref Range    Lactate (Lactic Acid) 1.6 0.5 - 2.2 mmol/L   APTT   Result Value Ref Range    aPTT 24.8 21.0 - 32.0 sec   Protime-INR   Result Value Ref Range    Prothrombin Time 11.8 9.0 - 12.5 sec    INR 1.1 0.8 - 1.2   Type & Screen   Result Value Ref Range    Group & Rh O POS     Indirect [place] NEG     Specimen Outdate 07/01/2023 23:59        Imaging Results:  Imaging Results              X-Ray Chest AP Portable (Final result)  Result time 06/28/23 09:26:23      Final result by Dung Ruano III, MD (06/28/23 09:26:23)                   Impression:      No acute abnormality.      Electronically signed by: Jesus Ruano  Date:    06/28/2023  Time:    09:26               Narrative:    EXAMINATION:  XR CHEST AP PORTABLE    CLINICAL HISTORY:  Chest Pain;.    TECHNIQUE:  Single frontal portable view of the chest was performed.    COMPARISON:  None    FINDINGS:  Support devices: None    The lungs are clear, with normal appearance of pulmonary vasculature and no pleural effusion or pneumothorax.    The cardiac silhouette is normal in size. The hilar and mediastinal contours are unremarkable.    Bones are intact.                                       The EKG was ordered, reviewed, and independently interpreted by the ED provider.  Interpretation time: 09:17  Rate: 90 BPM  Rhythm: normal sinus rhythm  Interpretation: No acute ST changes. No STEMI.         The Emergency Provider reviewed the vital signs and test results, which are outlined above.     ED Discussion     9:40 AM: Discussed pt's case with Dr. Hutchinson (General Surgery) who recommends CBC, CMP and EKG.     9:45 AM: Discussed  case with Dr Hutchinson (General Surgery). Dr. Hutchinson agrees with current care and management of pt and accepts admission.   Admitting Service: General Surgery  Admitting Physician: Dr. Hutchinson  Admit to: Inpatient     9:45 AM: Re-evaluated pt. I have discussed test results, shared treatment plan, and the need for admission with patient and family at bedside. Pt and family express understanding at this time and agree with all information. All questions answered. Pt and family have no further questions or concerns at this time. Pt is ready for admit.      Pt here for abdominal pain, nausea, vomiting  Medical Decision Making:   Initial Assessment:   Patient with lower chest pain and pretty severe abdominal pain with nausea vomiting for the past few days  Differential Diagnosis:   Gastroenteritis, bowel obstruction, colitis, diverticulitis, cholecystitis, appendicitis, perforated bowel, herniation, infectious etiology, UTI, pyelonephritis, biliary obstruction, lower lobe pneumonia, inferior cardiac infarct, incarcerated umbilical hernia  Clinical Tests:   Lab Tests: Ordered and Reviewed       <> Summary of Lab: CBC her hemoglobin hematocrit seems to be hemoconcentrated, CMP with acute renal failure, her lactate is elevated 1.6  Radiological Study: Ordered and Reviewed  Medical Tests: Ordered and Reviewed  ED Management:  Patient with they incarcerated hernia, Dr. Hutchinson was consulted immediately and looked at the pictures and decided to take the patient to the OR.  Patient was NPO given IV fluids, EKG did not show any STEMI.  She was given pain medication nausea medication emergency room and then was transferred to surgery         ED Medication(s):  Medications   ALPRAZolam tablet 0.25 mg (0.25 mg Oral Not Given 6/28/23 1500)   FLUoxetine capsule 40 mg (40 mg Oral Not Given 6/28/23 1415)   montelukast tablet 10 mg (has no administration in time range)   tiZANidine tablet 4 mg (4 mg Oral Not Given 6/28/23 1415)    lactated ringers infusion ( Intravenous New Bag 6/28/23 1455)   chlorhexidine 0.12 % solution 10 mL (has no administration in time range)   enoxaparin injection 40 mg (has no administration in time range)   HYDROmorphone (PF) injection 1 mg (has no administration in time range)   ondansetron disintegrating tablet 8 mg (has no administration in time range)   metoclopramide HCl injection 5 mg (has no administration in time range)   oxyCODONE immediate release tablet 10 mg (has no administration in time range)   oxyCODONE immediate release tablet 10 mg (has no administration in time range)   acetaminophen tablet 1,000 mg (1,000 mg Oral Not Given 6/28/23 1415)   diphenhydrAMINE capsule 25 mg (has no administration in time range)   famotidine tablet 20 mg (20 mg Oral Not Given 6/28/23 1415)   sodium chloride 0.9% bolus 1,000 mL 1,000 mL (0 mLs Intravenous Paused 6/28/23 1011)   0.9%  NaCl infusion (0 mLs Intravenous Paused 6/28/23 1010)   HYDROmorphone (PF) injection 1 mg (1 mg Intravenous Given 6/28/23 0930)   ondansetron injection 4 mg (4 mg Intravenous Given 6/28/23 0932)       Current Discharge Medication List                  Scribe Attestation:   Scribe #1: I performed the above scribed service and the documentation accurately describes the services I performed. I attest to the accuracy of the note.     Attending:   Physician Attestation Statement for Scribe #1: I, Sharron Peterson Do, MD, personally performed the services described in this documentation, as scribed by Neville Moran, in my presence, and it is both accurate and complete.           Clinical Impression       ICD-10-CM ICD-9-CM   1. Chest pain  R07.9 786.50   2. Incarcerated umbilical hernia  K42.0 552.1       Disposition:   Disposition: Admitted  Condition: Serious       Sharron Peterson Do, MD  06/28/23 2322       Sharron Peterson Do, MD  06/28/23 1162

## 2023-06-28 NOTE — OR NURSING
Ring to right fourth finger causing swelling and redness. Unable to remove. Ring had to be cut from finger to prevent further injury. PACU nurse, Meryl mcclure. Ring was stapled to chart and given to PACU RN to return to patient.

## 2023-06-28 NOTE — SUBJECTIVE & OBJECTIVE
No current facility-administered medications on file prior to encounter.     Current Outpatient Medications on File Prior to Encounter   Medication Sig    alprazolam (XANAX) 0.25 MG tablet Take 0.25 mg by mouth 3 (three) times daily.    cetirizine (ZYRTEC) 10 MG tablet Take 10 mg by mouth once daily.    FLUOXETINE HCL (PROZAC ORAL) Take 60 mg by mouth once daily.    MELOXICAM (MOBIC ORAL) Take by mouth.    montelukast (SINGULAIR) 10 mg tablet Take 10 mg by mouth every evening.    TIZANIDINE HCL (ZANAFLEX ORAL) Take by mouth.    tramadol (ULTRAM) 50 mg tablet Take 50 mg by mouth every 6 (six) hours as needed.    diclofenac (VOLTAREN) 50 MG EC tablet Take 1 tablet (50 mg total) by mouth 2 (two) times daily as needed.    gabapentin (NEURONTIN) 600 MG tablet Take 600 mg by mouth 3 (three) times daily.       Review of patient's allergies indicates:   Allergen Reactions    Opioids - morphine analogues        Past Medical History:   Diagnosis Date    Anxiety     Back pain     Depression      Past Surgical History:   Procedure Laterality Date    CHOLECYSTECTOMY      HYSTERECTOMY       Family History    None       Tobacco Use    Smoking status: Every Day     Packs/day: 0.50     Types: Cigarettes    Smokeless tobacco: Never   Substance and Sexual Activity    Alcohol use: No    Drug use: No    Sexual activity: Not on file     Review of Systems   Constitutional:  Negative for appetite change, chills, fatigue, fever and unexpected weight change.   HENT:  Negative for hearing loss and rhinorrhea.    Eyes:  Negative for visual disturbance.   Respiratory:  Negative for apnea, cough, shortness of breath and wheezing.    Cardiovascular:  Negative for chest pain and palpitations.   Gastrointestinal:  Positive for abdominal pain, nausea and vomiting. Negative for abdominal distention, blood in stool, constipation and diarrhea.        Painful bulge just above the umbilicus   Genitourinary:  Negative for dysuria, frequency and urgency.    Musculoskeletal:  Negative for arthralgias and neck pain.   Skin:  Negative for rash.   Neurological:  Negative for seizures, weakness, numbness and headaches.   Hematological:  Negative for adenopathy. Does not bruise/bleed easily.   Psychiatric/Behavioral:  Negative for hallucinations. The patient is not nervous/anxious.    All other systems reviewed and are negative.  Objective:     Vital Signs (Most Recent):  Temp: 97.9 °F (36.6 °C) (06/28/23 0851)  Pulse: 101 (06/28/23 0948)  Resp: 17 (06/28/23 0948)  BP: 113/73 (06/28/23 0948)  SpO2: (!) 93 % (06/28/23 0948) Vital Signs (24h Range):  Temp:  [97.9 °F (36.6 °C)] 97.9 °F (36.6 °C)  Pulse:  [] 101  Resp:  [17-24] 17  SpO2:  [93 %-96 %] 93 %  BP: (113-120)/(73-86) 113/73     Weight: 90.7 kg (200 lb) (bed scale broken )  Body mass index is 35.43 kg/m².     Physical Exam  Vitals and nursing note reviewed.   Constitutional:       Appearance: She is well-developed. She is obese. She is ill-appearing.   HENT:      Head: Normocephalic.   Eyes:      Pupils: Pupils are equal, round, and reactive to light.   Neck:      Thyroid: No thyromegaly.      Vascular: No JVD.      Trachea: No tracheal deviation.   Cardiovascular:      Rate and Rhythm: Normal rate and regular rhythm.      Heart sounds: Normal heart sounds.   Pulmonary:      Breath sounds: Normal breath sounds. No wheezing.   Abdominal:      General: Bowel sounds are normal. There is no distension.      Palpations: Abdomen is soft. Abdomen is not rigid. There is no mass.      Tenderness: Tenderness: at the non reducible mass. There is no guarding or rebound.      Hernia: A hernia (10 cm mass above the umbilicus, non reducible, skin erythema) is present.      Comments: Obese   Musculoskeletal:         General: Normal range of motion.   Lymphadenopathy:      Cervical: No cervical adenopathy.   Skin:     General: Skin is warm and dry.      Findings: No erythema or rash.   Neurological:      Mental Status: She is  oriented to person, place, and time.          I have reviewed all pertinent lab results within the past 24 hours.  CBC:   Recent Labs   Lab 06/28/23  0939   WBC 13.84*   RBC 6.48*   HGB 17.1*   HCT 51.9*   *   MCV 80*   MCH 26.4*   MCHC 32.9     BMP: No results for input(s): GLU, NA, K, CL, CO2, BUN, CREATININE, CALCIUM, MG in the last 168 hours.  CMP: No results for input(s): GLU, CALCIUM, ALBUMIN, PROT, NA, K, CO2, CL, BUN, CREATININE, ALKPHOS, ALT, AST, BILITOT in the last 168 hours.    Additional labs are pending and will reviewed.  EKG pending    Significant Diagnostics:  I have reviewed all pertinent imaging results/findings within the past 24 hours.  None

## 2023-06-29 LAB
ANION GAP SERPL CALC-SCNC: 10 MMOL/L (ref 8–16)
ANION GAP SERPL CALC-SCNC: 10 MMOL/L (ref 8–16)
BASOPHILS # BLD AUTO: 0.05 K/UL (ref 0–0.2)
BASOPHILS NFR BLD: 0.4 % (ref 0–1.9)
BUN SERPL-MCNC: 25 MG/DL (ref 6–20)
BUN SERPL-MCNC: 25 MG/DL (ref 6–20)
CALCIUM SERPL-MCNC: 9.1 MG/DL (ref 8.7–10.5)
CALCIUM SERPL-MCNC: 9.1 MG/DL (ref 8.7–10.5)
CHLORIDE SERPL-SCNC: 99 MMOL/L (ref 95–110)
CHLORIDE SERPL-SCNC: 99 MMOL/L (ref 95–110)
CO2 SERPL-SCNC: 27 MMOL/L (ref 23–29)
CO2 SERPL-SCNC: 27 MMOL/L (ref 23–29)
CREAT SERPL-MCNC: 0.8 MG/DL (ref 0.5–1.4)
CREAT SERPL-MCNC: 0.8 MG/DL (ref 0.5–1.4)
DIFFERENTIAL METHOD: ABNORMAL
EOSINOPHIL # BLD AUTO: 0.1 K/UL (ref 0–0.5)
EOSINOPHIL NFR BLD: 0.6 % (ref 0–8)
ERYTHROCYTE [DISTWIDTH] IN BLOOD BY AUTOMATED COUNT: 15.4 % (ref 11.5–14.5)
EST. GFR  (NO RACE VARIABLE): >60 ML/MIN/1.73 M^2
EST. GFR  (NO RACE VARIABLE): >60 ML/MIN/1.73 M^2
GLUCOSE SERPL-MCNC: 83 MG/DL (ref 70–110)
GLUCOSE SERPL-MCNC: 83 MG/DL (ref 70–110)
HCT VFR BLD AUTO: 45.3 % (ref 37–48.5)
HGB BLD-MCNC: 14.3 G/DL (ref 12–16)
IMM GRANULOCYTES # BLD AUTO: 0.05 K/UL (ref 0–0.04)
IMM GRANULOCYTES NFR BLD AUTO: 0.4 % (ref 0–0.5)
LYMPHOCYTES # BLD AUTO: 1.4 K/UL (ref 1–4.8)
LYMPHOCYTES NFR BLD: 11.9 % (ref 18–48)
MCH RBC QN AUTO: 26.6 PG (ref 27–31)
MCHC RBC AUTO-ENTMCNC: 31.6 G/DL (ref 32–36)
MCV RBC AUTO: 84 FL (ref 82–98)
MONOCYTES # BLD AUTO: 1.2 K/UL (ref 0.3–1)
MONOCYTES NFR BLD: 9.8 % (ref 4–15)
NEUTROPHILS # BLD AUTO: 9 K/UL (ref 1.8–7.7)
NEUTROPHILS NFR BLD: 76.9 % (ref 38–73)
NRBC BLD-RTO: 0 /100 WBC
PLATELET # BLD AUTO: 354 K/UL (ref 150–450)
PMV BLD AUTO: 9 FL (ref 9.2–12.9)
POTASSIUM SERPL-SCNC: 4.1 MMOL/L (ref 3.5–5.1)
POTASSIUM SERPL-SCNC: 4.1 MMOL/L (ref 3.5–5.1)
RBC # BLD AUTO: 5.38 M/UL (ref 4–5.4)
SODIUM SERPL-SCNC: 136 MMOL/L (ref 136–145)
SODIUM SERPL-SCNC: 136 MMOL/L (ref 136–145)
WBC # BLD AUTO: 11.73 K/UL (ref 3.9–12.7)

## 2023-06-29 PROCEDURE — 63600175 PHARM REV CODE 636 W HCPCS: Performed by: SURGERY

## 2023-06-29 PROCEDURE — 25000003 PHARM REV CODE 250: Performed by: SURGERY

## 2023-06-29 PROCEDURE — 99900035 HC TECH TIME PER 15 MIN (STAT)

## 2023-06-29 PROCEDURE — 94761 N-INVAS EAR/PLS OXIMETRY MLT: CPT

## 2023-06-29 PROCEDURE — 36415 COLL VENOUS BLD VENIPUNCTURE: CPT | Performed by: SURGERY

## 2023-06-29 PROCEDURE — 80048 BASIC METABOLIC PNL TOTAL CA: CPT | Performed by: SURGERY

## 2023-06-29 PROCEDURE — 85025 COMPLETE CBC W/AUTO DIFF WBC: CPT | Performed by: SURGERY

## 2023-06-29 PROCEDURE — 21400001 HC TELEMETRY ROOM

## 2023-06-29 PROCEDURE — 27000221 HC OXYGEN, UP TO 24 HOURS

## 2023-06-29 RX ORDER — FAMOTIDINE 20 MG/1
20 TABLET, FILM COATED ORAL 2 TIMES DAILY
Status: DISCONTINUED | OUTPATIENT
Start: 2023-06-29 | End: 2023-06-30

## 2023-06-29 RX ORDER — ENOXAPARIN SODIUM 100 MG/ML
40 INJECTION SUBCUTANEOUS EVERY 12 HOURS
Status: DISCONTINUED | OUTPATIENT
Start: 2023-06-29 | End: 2023-06-30

## 2023-06-29 RX ORDER — OXYCODONE HYDROCHLORIDE 5 MG/1
15 TABLET ORAL EVERY 6 HOURS PRN
Status: DISCONTINUED | OUTPATIENT
Start: 2023-06-29 | End: 2023-06-30

## 2023-06-29 RX ORDER — HYDROMORPHONE HYDROCHLORIDE 1 MG/ML
1 INJECTION, SOLUTION INTRAMUSCULAR; INTRAVENOUS; SUBCUTANEOUS EVERY 4 HOURS PRN
Status: DISCONTINUED | OUTPATIENT
Start: 2023-06-29 | End: 2023-07-06

## 2023-06-29 RX ORDER — OXYCODONE HYDROCHLORIDE 5 MG/1
20 TABLET ORAL EVERY 6 HOURS PRN
Status: DISCONTINUED | OUTPATIENT
Start: 2023-06-29 | End: 2023-06-30

## 2023-06-29 RX ADMIN — HYDROMORPHONE HYDROCHLORIDE 1 MG: 1 INJECTION, SOLUTION INTRAMUSCULAR; INTRAVENOUS; SUBCUTANEOUS at 09:06

## 2023-06-29 RX ADMIN — ALPRAZOLAM 0.25 MG: 0.25 TABLET ORAL at 02:06

## 2023-06-29 RX ADMIN — HYDROMORPHONE HYDROCHLORIDE 1 MG: 1 INJECTION, SOLUTION INTRAMUSCULAR; INTRAVENOUS; SUBCUTANEOUS at 08:06

## 2023-06-29 RX ADMIN — CHLORHEXIDINE GLUCONATE 0.12% ORAL RINSE 10 ML: 1.2 LIQUID ORAL at 09:06

## 2023-06-29 RX ADMIN — ACETAMINOPHEN 1000 MG: 500 TABLET ORAL at 02:06

## 2023-06-29 RX ADMIN — SODIUM CHLORIDE, POTASSIUM CHLORIDE, SODIUM LACTATE AND CALCIUM CHLORIDE: 600; 310; 30; 20 INJECTION, SOLUTION INTRAVENOUS at 08:06

## 2023-06-29 RX ADMIN — MONTELUKAST 10 MG: 10 TABLET, FILM COATED ORAL at 08:06

## 2023-06-29 RX ADMIN — HYDROMORPHONE HYDROCHLORIDE 1 MG: 1 INJECTION, SOLUTION INTRAMUSCULAR; INTRAVENOUS; SUBCUTANEOUS at 02:06

## 2023-06-29 RX ADMIN — DIPHENHYDRAMINE HYDROCHLORIDE 25 MG: 25 CAPSULE ORAL at 02:06

## 2023-06-29 RX ADMIN — ONDANSETRON 8 MG: 8 TABLET, ORALLY DISINTEGRATING ORAL at 09:06

## 2023-06-29 RX ADMIN — ACETAMINOPHEN 1000 MG: 500 TABLET ORAL at 05:06

## 2023-06-29 RX ADMIN — OXYCODONE HYDROCHLORIDE 10 MG: 5 TABLET ORAL at 12:06

## 2023-06-29 RX ADMIN — FLUOXETINE 40 MG: 20 CAPSULE ORAL at 09:06

## 2023-06-29 RX ADMIN — SODIUM CHLORIDE, POTASSIUM CHLORIDE, SODIUM LACTATE AND CALCIUM CHLORIDE: 600; 310; 30; 20 INJECTION, SOLUTION INTRAVENOUS at 07:06

## 2023-06-29 RX ADMIN — CHLORHEXIDINE GLUCONATE 0.12% ORAL RINSE 10 ML: 1.2 LIQUID ORAL at 08:06

## 2023-06-29 RX ADMIN — ENOXAPARIN SODIUM 40 MG: 40 INJECTION SUBCUTANEOUS at 08:06

## 2023-06-29 RX ADMIN — FAMOTIDINE 20 MG: 20 TABLET, FILM COATED ORAL at 09:06

## 2023-06-29 RX ADMIN — FAMOTIDINE 20 MG: 20 TABLET, FILM COATED ORAL at 08:06

## 2023-06-29 RX ADMIN — ALPRAZOLAM 0.25 MG: 0.25 TABLET ORAL at 09:06

## 2023-06-29 RX ADMIN — TIZANIDINE 4 MG: 4 TABLET ORAL at 05:06

## 2023-06-29 RX ADMIN — TIZANIDINE 4 MG: 4 TABLET ORAL at 02:06

## 2023-06-29 RX ADMIN — ALPRAZOLAM 0.25 MG: 0.25 TABLET ORAL at 08:06

## 2023-06-29 RX ADMIN — ENOXAPARIN SODIUM 40 MG: 40 INJECTION SUBCUTANEOUS at 11:06

## 2023-06-29 RX ADMIN — METOCLOPRAMIDE 5 MG: 5 INJECTION, SOLUTION INTRAMUSCULAR; INTRAVENOUS at 10:06

## 2023-06-29 NOTE — PLAN OF CARE
Problem: Adult Inpatient Plan of Care  Goal: Plan of Care Review  Outcome: Ongoing, Progressing  Goal: Absence of Hospital-Acquired Illness or Injury  Outcome: Ongoing, Progressing  Intervention: Identify and Manage Fall Risk  Flowsheets (Taken 6/28/2023 1941)  Safety Promotion/Fall Prevention:   side rails raised x 2   bed alarm set   assistive device/personal item within reach   Fall Risk reviewed with patient/family   Fall Risk signage in place   instructed to call staff for mobility   nonskid shoes/socks when out of bed  Intervention: Prevent Infection  Flowsheets (Taken 6/28/2023 1941)  Infection Prevention:   environmental surveillance performed   personal protective equipment utilized   rest/sleep promoted   single patient room provided   equipment surfaces disinfected   hand hygiene promoted  Goal: Optimal Comfort and Wellbeing  Outcome: Ongoing, Progressing  Intervention: Monitor Pain and Promote Comfort  Flowsheets (Taken 6/28/2023 1941)  Pain Management Interventions:   breathing exercises utilized   care clustered   position adjusted   quiet environment facilitated   pain management plan reviewed with patient/caregiver     Problem: Skin Injury Risk Increased  Goal: Skin Health and Integrity  Outcome: Ongoing, Progressing  Intervention: Promote and Optimize Oral Intake  Flowsheets (Taken 6/28/2023 1941)  Oral Nutrition Promotion: rest periods promoted     Problem: Infection  Goal: Absence of Infection Signs and Symptoms  Outcome: Ongoing, Progressing  Intervention: Prevent or Manage Infection  Flowsheets (Taken 6/28/2023 1941)  Fever Reduction/Comfort Measures:   lightweight bedding   lightweight clothing     Problem: Fall Injury Risk  Goal: Absence of Fall and Fall-Related Injury  Outcome: Ongoing, Progressing  Intervention: Identify and Manage Contributors  Flowsheets (Taken 6/28/2023 1941)  Self-Care Promotion:   BADL personal objects within reach   independence encouraged

## 2023-06-29 NOTE — PROGRESS NOTES
[facility]  General Surgery  Progress Note    Subjective:     History of Present Illness:  58-year-old female with a 5 day history of bulging at the umbilicus.  The area has become red and tender.  The pain increased and she presented to the emergency room.  She had associated nausea and vomiting She has not had anything to eat and drink in a few days.  She denies any fever or chills.  She is not on any anticoagulation medicines.      Patient was evaluated in the emergency room and a urgent surgical consult was obtained.      Post-Op Info:  Procedure(s) (LRB):  REPAIR, HERNIA, UMBILICAL, INCARCERATED, AGE 5 YEARS OR OLDER (N/A)  OMENTECTOMY (N/A)   1 Day Post-Op     Interval History:  Complains of incisional pain.  Nausea tolerated clear liquids.  Watkins catheter removed.  Adjust pain medicines.  Advance diet.  Physical therapy per ambulation.    Medications:  Continuous Infusions:   lactated ringers 100 mL/hr at 06/29/23 0742     Scheduled Meds:   acetaminophen  1,000 mg Oral Q8H    ALPRAZolam  0.25 mg Oral TID    chlorhexidine  10 mL Mouth/Throat BID    enoxparin  40 mg Subcutaneous Daily    famotidine  20 mg Oral Daily    FLUoxetine  40 mg Oral Daily    montelukast  10 mg Oral QHS    tiZANidine  4 mg Oral Q8H     PRN Meds:diphenhydrAMINE, HYDROmorphone, metoclopramide HCl, ondansetron, oxyCODONE, oxyCODONE     Review of patient's allergies indicates:   Allergen Reactions    Opioids - morphine analogues      Objective:     Vital Signs (Most Recent):  Temp: 98 °F (36.7 °C) (06/29/23 0419)  Pulse: 74 (06/29/23 0542)  Resp: 18 (06/29/23 0419)  BP: 119/70 (06/29/23 0419)  SpO2: (!) 94 % (06/29/23 0419) Vital Signs (24h Range):  Temp:  [97.6 °F (36.4 °C)-98.3 °F (36.8 °C)] 98 °F (36.7 °C)  Pulse:  [] 74  Resp:  [16-24] 18  SpO2:  [93 %-99 %] 94 %  BP: (111-162)/(65-90) 119/70     Weight: 108.6 kg (239 lb 6.7 oz)  Body mass index is 42.41 kg/m².    Intake/Output - Last 3 Shifts          06/27 0700  06/28 0659 06/28 0700  06/29 0659 06/29 0700 06/30 0659    P.O.  240     I.V. (mL/kg)  1598.2 (14.7)     IV Piggyback  1500     Total Intake(mL/kg)  3338.2 (30.7)     Urine (mL/kg/hr)  1230     Drains  85     Blood  40     Total Output  1355     Net  +1983.2                     Physical Exam  Vitals reviewed.   Constitutional:       Appearance: She is well-developed. She is obese. She is ill-appearing.   HENT:      Head: Normocephalic.   Eyes:      Pupils: Pupils are equal, round, and reactive to light.   Neck:      Thyroid: No thyromegaly.      Vascular: No JVD.      Trachea: No tracheal deviation.   Cardiovascular:      Rate and Rhythm: Normal rate and regular rhythm.      Heart sounds: Normal heart sounds.   Pulmonary:      Breath sounds: Normal breath sounds. No wheezing.   Abdominal:      General: Bowel sounds are normal. There is no distension.      Palpations: Abdomen is soft. Abdomen is not rigid. There is no mass.      Tenderness: There is no abdominal tenderness (incisional). There is no guarding or rebound.      Comments: Obese.  Mild erythema around the incision but improved.  Drain is serous.   Musculoskeletal:         General: Normal range of motion.      Right lower leg: No edema.      Left lower leg: No edema.   Lymphadenopathy:      Cervical: No cervical adenopathy.   Skin:     General: Skin is warm and dry.      Findings: No erythema or rash.   Neurological:      Mental Status: She is oriented to person, place, and time.   Psychiatric:         Mood and Affect: Mood normal.         Behavior: Behavior normal.         Thought Content: Thought content normal.         Judgment: Judgment normal.        Significant Labs:  I have reviewed all pertinent lab results within the past 24 hours.  CBC:   Recent Labs   Lab 06/29/23  0538   WBC 11.73   RBC 5.38   HGB 14.3   HCT 45.3      MCV 84   MCH 26.6*   MCHC 31.6*     BMP:   Recent Labs   Lab 06/29/23  0538   GLU 83  83     136   K  4.1  4.1   CL 99  99   CO2 27  27   BUN 25*  25*   CREATININE 0.8  0.8   CALCIUM 9.1  9.1     CMP:   Recent Labs   Lab 06/28/23  0939 06/29/23  0538   * 83  83   CALCIUM 10.6* 9.1  9.1   ALBUMIN 3.1*  --    PROT 8.0  --    * 136  136   K 4.4 4.1  4.1   CO2 22* 27  27   CL 91* 99  99   BUN 50* 25*  25*   CREATININE 1.5* 0.8  0.8   ALKPHOS 133  --    ALT 43  --    AST 26  --    BILITOT 1.0  --        Significant Diagnostics:  I have reviewed all pertinent imaging results/findings within the past 24 hours.  No new    Assessment/Plan:     * Incarcerated ventral hernia  Open incisional hernia repair with mesh, partial omentectomy 06/20/2023   Incisional pain   Tolerating clears  Advanced diet   Incentive spirometer  Pneumatic compression device   Lovenox  Incentive spirometer   increased pain medicine  Consult physical therapy    BMI 35.0-35.9,adult  Increases the risk of recurrence and wound complications        Marcus Hutchinson MD  General Surgery  [facility] ([facility]

## 2023-06-29 NOTE — ASSESSMENT & PLAN NOTE
Open incisional hernia repair with mesh, partial omentectomy 06/20/2023   Incisional pain   Tolerating clears  Advanced diet   Incentive spirometer  Pneumatic compression device   Lovenox  Incentive spirometer   increased pain medicine  Consult physical therapy

## 2023-06-29 NOTE — PLAN OF CARE
[facility] (Hospital)  Initial Discharge Assessment       Primary Care Provider: Lali Hatch MD    Admission Diagnosis: Incarcerated umbilical hernia [K42.0]  Chest pain [R07.9]  Incarcerated ventral hernia [K43.6]    Admission Date: 6/28/2023  Expected Discharge Date:     Transition of Care Barriers: None    Payor: MEDICAID / Plan: [place] [employer] / Product Type: Managed Medicaid /     Extended Emergency Contact Information  Primary Emergency Contact: Ankita Nicolas   [place]  Mobile Phone: [phone]  Relation: Mother    Discharge Plan A: Home with family         [id number] [place] [facility] [address]  [address] [place] [address]  Phone: [phone] Fax: [phone]      Initial Assessment (most recent)       Adult Discharge Assessment - 06/29/23 1424          Discharge Assessment    Assessment Type Discharge Planning Assessment     Confirmed/corrected address, phone number and insurance Yes     Confirmed Demographics Correct on Facesheet     Source of Information patient     When was your last doctors appointment? --   no PCP    Communicated ROSALVA with patient/caregiver Date not available/Unable to determine     Reason For Admission Incarcerated Hernia     People in Home parent(s);friend(s)     Facility Arrived From: home     Do you expect to return to your current living situation? Yes     Do you have help at home or someone to help you manage your care at home? Yes     Who are your caregiver(s) and their phone number(s)? girlfriend     Prior to hospitilization cognitive status: Alert/Oriented     Current cognitive status: Alert/Oriented     Equipment Currently Used at Home none     Readmission within 30 days? No     Patient currently being followed by outpatient case management? No     Do you currently have service(s) that help you manage your care at home? No     Do you take prescription medications? Yes     Do you have prescription coverage? Yes      Coverage Medicaid     Do you have any problems affording any of your prescribed medications? No     Is the patient taking medications as prescribed? yes     Who is going to help you get home at discharge? firend     How do you get to doctors appointments? health plan transportation     Are you on dialysis? No     Do you take coumadin? No     Discharge Plan A Home with family     DME Needed Upon Discharge  none     Discharge Plan discussed with: Patient     Transition of Care Barriers None                     Met with patient for discharge assessment.  Patient lives with her mother and girlfriend.  Girlfriend can be her help at home.  She is independent with ADL's and uses no medical equipment.  Currently no discharge needs.

## 2023-06-29 NOTE — SUBJECTIVE & OBJECTIVE
Interval History:  Complains of incisional pain.  Nausea tolerated clear liquids.  Watkins catheter removed.  Adjust pain medicines.  Advance diet.  Physical therapy per ambulation.    Medications:  Continuous Infusions:   lactated ringers 100 mL/hr at 06/29/23 0742     Scheduled Meds:   acetaminophen  1,000 mg Oral Q8H    ALPRAZolam  0.25 mg Oral TID    chlorhexidine  10 mL Mouth/Throat BID    enoxparin  40 mg Subcutaneous Daily    famotidine  20 mg Oral Daily    FLUoxetine  40 mg Oral Daily    montelukast  10 mg Oral QHS    tiZANidine  4 mg Oral Q8H     PRN Meds:diphenhydrAMINE, HYDROmorphone, metoclopramide HCl, ondansetron, oxyCODONE, oxyCODONE     Review of patient's allergies indicates:   Allergen Reactions    Opioids - morphine analogues      Objective:     Vital Signs (Most Recent):  Temp: 98 °F (36.7 °C) (06/29/23 0419)  Pulse: 74 (06/29/23 0542)  Resp: 18 (06/29/23 0419)  BP: 119/70 (06/29/23 0419)  SpO2: (!) 94 % (06/29/23 0419) Vital Signs (24h Range):  Temp:  [97.6 °F (36.4 °C)-98.3 °F (36.8 °C)] 98 °F (36.7 °C)  Pulse:  [] 74  Resp:  [16-24] 18  SpO2:  [93 %-99 %] 94 %  BP: (111-162)/(65-90) 119/70     Weight: 108.6 kg (239 lb 6.7 oz)  Body mass index is 42.41 kg/m².    Intake/Output - Last 3 Shifts         06/27 0700  06/28 0659 06/28 0700  06/29 0659 06/29 0700  06/30 0659    P.O.  240     I.V. (mL/kg)  1598.2 (14.7)     IV Piggyback  1500     Total Intake(mL/kg)  3338.2 (30.7)     Urine (mL/kg/hr)  1230     Drains  85     Blood  40     Total Output  1355     Net  +1983.2                     Physical Exam  Vitals reviewed.   Constitutional:       Appearance: She is well-developed. She is obese. She is ill-appearing.   HENT:      Head: Normocephalic.   Eyes:      Pupils: Pupils are equal, round, and reactive to light.   Neck:      Thyroid: No thyromegaly.      Vascular: No JVD.      Trachea: No tracheal deviation.   Cardiovascular:      Rate and Rhythm: Normal rate and regular rhythm.      Heart  sounds: Normal heart sounds.   Pulmonary:      Breath sounds: Normal breath sounds. No wheezing.   Abdominal:      General: Bowel sounds are normal. There is no distension.      Palpations: Abdomen is soft. Abdomen is not rigid. There is no mass.      Tenderness: There is no abdominal tenderness (incisional). There is no guarding or rebound.      Comments: Obese.  Mild erythema around the incision but improved.  Drain is serous.   Musculoskeletal:         General: Normal range of motion.      Right lower leg: No edema.      Left lower leg: No edema.   Lymphadenopathy:      Cervical: No cervical adenopathy.   Skin:     General: Skin is warm and dry.      Findings: No erythema or rash.   Neurological:      Mental Status: She is oriented to person, place, and time.   Psychiatric:         Mood and Affect: Mood normal.         Behavior: Behavior normal.         Thought Content: Thought content normal.         Judgment: Judgment normal.        Significant Labs:  I have reviewed all pertinent lab results within the past 24 hours.  CBC:   Recent Labs   Lab 06/29/23  0538   WBC 11.73   RBC 5.38   HGB 14.3   HCT 45.3      MCV 84   MCH 26.6*   MCHC 31.6*     BMP:   Recent Labs   Lab 06/29/23  0538   GLU 83  83     136   K 4.1  4.1   CL 99  99   CO2 27  27   BUN 25*  25*   CREATININE 0.8  0.8   CALCIUM 9.1  9.1     CMP:   Recent Labs   Lab 06/28/23  0939 06/29/23  0538   * 83  83   CALCIUM 10.6* 9.1  9.1   ALBUMIN 3.1*  --    PROT 8.0  --    * 136  136   K 4.4 4.1  4.1   CO2 22* 27  27   CL 91* 99  99   BUN 50* 25*  25*   CREATININE 1.5* 0.8  0.8   ALKPHOS 133  --    ALT 43  --    AST 26  --    BILITOT 1.0  --        Significant Diagnostics:  I have reviewed all pertinent imaging results/findings within the past 24 hours.  No new

## 2023-06-29 NOTE — PROGRESS NOTES
Pharmacist Renal Dose Adjustment Note    Hamzah Nicolas is a 58 y.o. female being treated with the medication famotidine    Patient Data:    Vital Signs (Most Recent):  Temp: 98.8 °F (37.1 °C) (06/29/23 0910)  Pulse: 98 (06/29/23 0910)  Resp: (!) 30 (06/29/23 0910)  BP: 119/69 (06/29/23 0910)  SpO2: 95 % (06/29/23 0910) Vital Signs (72h Range):  Temp:  [97.6 °F (36.4 °C)-98.8 °F (37.1 °C)]   Pulse:  []   Resp:  [16-30]   BP: (111-162)/(65-90)   SpO2:  [93 %-99 %]      Recent Labs   Lab 06/28/23  0939 06/29/23  0538   CREATININE 1.5* 0.8  0.8     Serum creatinine: 0.8 mg/dL 06/29/23 0538  Estimated creatinine clearance: 89.5 mL/min    Medication: Famotidine 20 mg daily was increased to 20 mg BID per protocol / crcl > 50.    Pharmacist's Name: Oliver Kenyon  Pharmacist's Extension: [phone]

## 2023-06-29 NOTE — PT/OT/SLP PROGRESS
Physical Therapy      Patient Name:  Hamzah Nicolas   MRN:  [id number]    PT presented to pt's room at 1155. Patient refused all OOB activity and in bed TherEx due to reported fatigue and severe pain. Encouragement and education provided on benefits of early mobilization. Will continue efforts.    Macey Willard, PT, DPT  2840  6/29/23

## 2023-06-29 NOTE — PROGRESS NOTES
Pharmacist Renal Dose Adjustment Note    Hamzah Nicolas is a 58 y.o. female being treated with the medication Lovenox    Patient Data:    Vital Signs (Most Recent):  Temp: 98.8 °F (37.1 °C) (06/29/23 0910)  Pulse: 98 (06/29/23 0910)  Resp: (!) 30 (06/29/23 0910)  BP: 119/69 (06/29/23 0910)  SpO2: 95 % (06/29/23 0910) Vital Signs (72h Range):  Temp:  [97.6 °F (36.4 °C)-98.8 °F (37.1 °C)]   Pulse:  []   Resp:  [16-30]   BP: (111-162)/(65-90)   SpO2:  [93 %-99 %]      Recent Labs   Lab 06/28/23  0939 06/29/23  0538   CREATININE 1.5* 0.8  0.8     Serum creatinine: 0.8 mg/dL 06/29/23 0538  Estimated creatinine clearance: 89.5 mL/min    Medication:Lovenox 40 mg daily was increased to 40 mg BID per protocol / BMI > 40.    Pharmacist's Name: Oliver Kenyon  Pharmacist's Extension: [phone]

## 2023-06-29 NOTE — HOSPITAL COURSE
06/29/2023.  Postop day 1.  Incisional pain.  Nausea resolved.  Tolerated liquids.  Adjust pain medicines.  Advance diet.  Watkins catheter removed    06/30/2023.  Postop day 2.  Incisional pain.  Multiple episodes of nausea and vomiting.  Heartburn, reflux, upset emotionally    07/01/2023: POD 3. Feels slightly better today. Minor nausea but denies emesis today.     07/02/2023: POD 4. No further nausea or vomiting. No BM/flatus. Poor ambulation.     7/3/23:  anxiety about not being able to eat, no nausea and vomiting, bowel movement    07/04/2023.  Postop day 6.  Feeling better.  Tolerated full liquids.  Will need home oxygen.  Regular diet and if tolerated discharge home as long as patient can get transportation home oxygen can be arranged    07/05/2023.  Postop day 7.  Feeling better.  Tolerating a regular diet.  Physical therapy feels that skilled nursing is needed as she has limitations in mobility.  Patient has agreed to go to a skilled nursing facility.  This needs to be arranged by case management    07/06/2023.  Postop day 8.  Mainly is concerned about having multiple bowel movements.  Pain is controlled tolerating a diet.  Has been accepted at a skilled nursing unit, can be discharged with insurance approval    07/07/2023.  Postop day 9.  Having loose bowel movements.  Other than that no complaints.  Emotionally labile.  Accepted to skilled nursing.  Has not been transferred yet.  Informed her that the loose stool was likely from the Augmentin and should resolve over time.

## 2023-06-30 PROBLEM — R11.2 INTRACTABLE VOMITING WITH NAUSEA: Status: ACTIVE | Noted: 2023-06-30

## 2023-06-30 PROBLEM — J96.01 ACUTE HYPOXEMIC RESPIRATORY FAILURE: Status: ACTIVE | Noted: 2023-06-30

## 2023-06-30 LAB
ALBUMIN SERPL BCP-MCNC: 2.8 G/DL (ref 3.5–5.2)
ALP SERPL-CCNC: 103 U/L (ref 55–135)
ALT SERPL W/O P-5'-P-CCNC: 24 U/L (ref 10–44)
ANION GAP SERPL CALC-SCNC: 19 MMOL/L (ref 8–16)
AST SERPL-CCNC: 16 U/L (ref 10–40)
BASOPHILS # BLD AUTO: 0.05 K/UL (ref 0–0.2)
BASOPHILS NFR BLD: 0.8 % (ref 0–1.9)
BILIRUB SERPL-MCNC: 1.6 MG/DL (ref 0.1–1)
BNP SERPL-MCNC: 45 PG/ML (ref 0–99)
BUN SERPL-MCNC: 21 MG/DL (ref 6–20)
CALCIUM SERPL-MCNC: 10.2 MG/DL (ref 8.7–10.5)
CHLORIDE SERPL-SCNC: 95 MMOL/L (ref 95–110)
CO2 SERPL-SCNC: 24 MMOL/L (ref 23–29)
CREAT SERPL-MCNC: 0.7 MG/DL (ref 0.5–1.4)
D DIMER PPP IA.FEU-MCNC: 6.36 MG/L FEU
DIFFERENTIAL METHOD: ABNORMAL
EOSINOPHIL # BLD AUTO: 0 K/UL (ref 0–0.5)
EOSINOPHIL NFR BLD: 0 % (ref 0–8)
ERYTHROCYTE [DISTWIDTH] IN BLOOD BY AUTOMATED COUNT: 15.5 % (ref 11.5–14.5)
EST. GFR  (NO RACE VARIABLE): >60 ML/MIN/1.73 M^2
GLUCOSE SERPL-MCNC: 131 MG/DL (ref 70–110)
HCT VFR BLD AUTO: 49.2 % (ref 37–48.5)
HGB BLD-MCNC: 15.3 G/DL (ref 12–16)
IMM GRANULOCYTES # BLD AUTO: 0.02 K/UL (ref 0–0.04)
IMM GRANULOCYTES NFR BLD AUTO: 0.3 % (ref 0–0.5)
LYMPHOCYTES # BLD AUTO: 0.5 K/UL (ref 1–4.8)
LYMPHOCYTES NFR BLD: 8.1 % (ref 18–48)
MCH RBC QN AUTO: 26.4 PG (ref 27–31)
MCHC RBC AUTO-ENTMCNC: 31.1 G/DL (ref 32–36)
MCV RBC AUTO: 85 FL (ref 82–98)
MONOCYTES # BLD AUTO: 0.3 K/UL (ref 0.3–1)
MONOCYTES NFR BLD: 5.2 % (ref 4–15)
NEUTROPHILS # BLD AUTO: 5.4 K/UL (ref 1.8–7.7)
NEUTROPHILS NFR BLD: 85.6 % (ref 38–73)
NRBC BLD-RTO: 0 /100 WBC
PLATELET # BLD AUTO: 404 K/UL (ref 150–450)
PMV BLD AUTO: 8.6 FL (ref 9.2–12.9)
POTASSIUM SERPL-SCNC: 5 MMOL/L (ref 3.5–5.1)
PROCALCITONIN SERPL IA-MCNC: 0.39 NG/ML
PROT SERPL-MCNC: 7.1 G/DL (ref 6–8.4)
RBC # BLD AUTO: 5.79 M/UL (ref 4–5.4)
SODIUM SERPL-SCNC: 138 MMOL/L (ref 136–145)
WBC # BLD AUTO: 6.3 K/UL (ref 3.9–12.7)

## 2023-06-30 PROCEDURE — 84145 PROCALCITONIN (PCT): CPT | Performed by: SURGERY

## 2023-06-30 PROCEDURE — 36415 COLL VENOUS BLD VENIPUNCTURE: CPT | Performed by: STUDENT IN AN ORGANIZED HEALTH CARE EDUCATION/TRAINING PROGRAM

## 2023-06-30 PROCEDURE — 63600175 PHARM REV CODE 636 W HCPCS: Performed by: STUDENT IN AN ORGANIZED HEALTH CARE EDUCATION/TRAINING PROGRAM

## 2023-06-30 PROCEDURE — 25000003 PHARM REV CODE 250: Performed by: SURGERY

## 2023-06-30 PROCEDURE — 63600175 PHARM REV CODE 636 W HCPCS: Performed by: NURSE PRACTITIONER

## 2023-06-30 PROCEDURE — 21400001 HC TELEMETRY ROOM

## 2023-06-30 PROCEDURE — 36415 COLL VENOUS BLD VENIPUNCTURE: CPT | Performed by: NURSE PRACTITIONER

## 2023-06-30 PROCEDURE — 25500020 PHARM REV CODE 255: Performed by: SURGERY

## 2023-06-30 PROCEDURE — 27100171 HC OXYGEN HIGH FLOW UP TO 24 HOURS

## 2023-06-30 PROCEDURE — 63600175 PHARM REV CODE 636 W HCPCS: Performed by: SURGERY

## 2023-06-30 PROCEDURE — 94664 DEMO&/EVAL PT USE INHALER: CPT

## 2023-06-30 PROCEDURE — 27000646 HC AEROBIKA DEVICE

## 2023-06-30 PROCEDURE — 83880 ASSAY OF NATRIURETIC PEPTIDE: CPT | Performed by: NURSE PRACTITIONER

## 2023-06-30 PROCEDURE — 25000003 PHARM REV CODE 250: Performed by: NURSE PRACTITIONER

## 2023-06-30 PROCEDURE — 85379 FIBRIN DEGRADATION QUANT: CPT | Performed by: STUDENT IN AN ORGANIZED HEALTH CARE EDUCATION/TRAINING PROGRAM

## 2023-06-30 PROCEDURE — C9113 INJ PANTOPRAZOLE SODIUM, VIA: HCPCS | Performed by: STUDENT IN AN ORGANIZED HEALTH CARE EDUCATION/TRAINING PROGRAM

## 2023-06-30 PROCEDURE — 80053 COMPREHEN METABOLIC PANEL: CPT | Performed by: STUDENT IN AN ORGANIZED HEALTH CARE EDUCATION/TRAINING PROGRAM

## 2023-06-30 PROCEDURE — 94799 UNLISTED PULMONARY SVC/PX: CPT

## 2023-06-30 PROCEDURE — 94761 N-INVAS EAR/PLS OXIMETRY MLT: CPT

## 2023-06-30 PROCEDURE — 25000003 PHARM REV CODE 250: Performed by: STUDENT IN AN ORGANIZED HEALTH CARE EDUCATION/TRAINING PROGRAM

## 2023-06-30 PROCEDURE — 25000242 PHARM REV CODE 250 ALT 637 W/ HCPCS: Performed by: NURSE PRACTITIONER

## 2023-06-30 PROCEDURE — C9113 INJ PANTOPRAZOLE SODIUM, VIA: HCPCS | Performed by: SURGERY

## 2023-06-30 PROCEDURE — 27000221 HC OXYGEN, UP TO 24 HOURS

## 2023-06-30 PROCEDURE — 94640 AIRWAY INHALATION TREATMENT: CPT

## 2023-06-30 PROCEDURE — 99900035 HC TECH TIME PER 15 MIN (STAT)

## 2023-06-30 PROCEDURE — 85025 COMPLETE CBC W/AUTO DIFF WBC: CPT | Performed by: STUDENT IN AN ORGANIZED HEALTH CARE EDUCATION/TRAINING PROGRAM

## 2023-06-30 PROCEDURE — 25000242 PHARM REV CODE 250 ALT 637 W/ HCPCS: Performed by: STUDENT IN AN ORGANIZED HEALTH CARE EDUCATION/TRAINING PROGRAM

## 2023-06-30 RX ORDER — IPRATROPIUM BROMIDE AND ALBUTEROL SULFATE 2.5; .5 MG/3ML; MG/3ML
3 SOLUTION RESPIRATORY (INHALATION) EVERY 6 HOURS
Status: DISCONTINUED | OUTPATIENT
Start: 2023-06-30 | End: 2023-07-07 | Stop reason: HOSPADM

## 2023-06-30 RX ORDER — ONDANSETRON 2 MG/ML
8 INJECTION INTRAMUSCULAR; INTRAVENOUS EVERY 8 HOURS PRN
Status: DISCONTINUED | OUTPATIENT
Start: 2023-06-30 | End: 2023-07-06

## 2023-06-30 RX ORDER — PANTOPRAZOLE SODIUM 40 MG/10ML
40 INJECTION, POWDER, LYOPHILIZED, FOR SOLUTION INTRAVENOUS 2 TIMES DAILY
Status: DISCONTINUED | OUTPATIENT
Start: 2023-06-30 | End: 2023-07-04

## 2023-06-30 RX ORDER — DEXTROSE MONOHYDRATE, SODIUM CHLORIDE, AND POTASSIUM CHLORIDE 50; 1.49; 4.5 G/1000ML; G/1000ML; G/1000ML
INJECTION, SOLUTION INTRAVENOUS CONTINUOUS
Status: DISCONTINUED | OUTPATIENT
Start: 2023-06-30 | End: 2023-06-30

## 2023-06-30 RX ORDER — FLUTICASONE PROPIONATE 50 MCG
2 SPRAY, SUSPENSION (ML) NASAL DAILY
Status: DISCONTINUED | OUTPATIENT
Start: 2023-06-30 | End: 2023-07-07 | Stop reason: HOSPADM

## 2023-06-30 RX ORDER — HYDROMORPHONE HYDROCHLORIDE 1 MG/ML
0.5 INJECTION, SOLUTION INTRAMUSCULAR; INTRAVENOUS; SUBCUTANEOUS
Status: DISCONTINUED | OUTPATIENT
Start: 2023-06-30 | End: 2023-07-05

## 2023-06-30 RX ORDER — MAG HYDROX/ALUMINUM HYD/SIMETH 200-200-20
30 SUSPENSION, ORAL (FINAL DOSE FORM) ORAL EVERY 6 HOURS PRN
Status: DISCONTINUED | OUTPATIENT
Start: 2023-06-30 | End: 2023-07-07 | Stop reason: HOSPADM

## 2023-06-30 RX ORDER — PANTOPRAZOLE SODIUM 40 MG/10ML
40 INJECTION, POWDER, LYOPHILIZED, FOR SOLUTION INTRAVENOUS DAILY
Status: DISCONTINUED | OUTPATIENT
Start: 2023-06-30 | End: 2023-06-30

## 2023-06-30 RX ORDER — IPRATROPIUM BROMIDE AND ALBUTEROL SULFATE 2.5; .5 MG/3ML; MG/3ML
3 SOLUTION RESPIRATORY (INHALATION) EVERY 6 HOURS PRN
Status: DISCONTINUED | OUTPATIENT
Start: 2023-06-30 | End: 2023-06-30

## 2023-06-30 RX ORDER — GUAIFENESIN 600 MG/1
1200 TABLET, EXTENDED RELEASE ORAL 2 TIMES DAILY
Status: DISCONTINUED | OUTPATIENT
Start: 2023-06-30 | End: 2023-07-07 | Stop reason: HOSPADM

## 2023-06-30 RX ADMIN — PANTOPRAZOLE SODIUM 40 MG: 40 INJECTION, POWDER, FOR SOLUTION INTRAVENOUS at 10:06

## 2023-06-30 RX ADMIN — AMPICILLIN SODIUM AND SULBACTAM SODIUM 3 G: 2; 1 INJECTION, POWDER, FOR SOLUTION INTRAMUSCULAR; INTRAVENOUS at 05:06

## 2023-06-30 RX ADMIN — IOHEXOL 100 ML: 350 INJECTION, SOLUTION INTRAVENOUS at 01:06

## 2023-06-30 RX ADMIN — FLUOXETINE 40 MG: 20 CAPSULE ORAL at 09:06

## 2023-06-30 RX ADMIN — PROMETHAZINE HYDROCHLORIDE 12.5 MG: 25 INJECTION INTRAMUSCULAR; INTRAVENOUS at 12:06

## 2023-06-30 RX ADMIN — METOCLOPRAMIDE 5 MG: 5 INJECTION, SOLUTION INTRAMUSCULAR; INTRAVENOUS at 11:06

## 2023-06-30 RX ADMIN — CHLORHEXIDINE GLUCONATE 0.12% ORAL RINSE 10 ML: 1.2 LIQUID ORAL at 08:06

## 2023-06-30 RX ADMIN — AMPICILLIN SODIUM AND SULBACTAM SODIUM 3 G: 2; 1 INJECTION, POWDER, FOR SOLUTION INTRAMUSCULAR; INTRAVENOUS at 11:06

## 2023-06-30 RX ADMIN — ALPRAZOLAM 0.25 MG: 0.25 TABLET ORAL at 02:06

## 2023-06-30 RX ADMIN — IPRATROPIUM BROMIDE AND ALBUTEROL SULFATE 3 ML: 2.5; .5 SOLUTION RESPIRATORY (INHALATION) at 08:06

## 2023-06-30 RX ADMIN — ALPRAZOLAM 0.25 MG: 0.25 TABLET ORAL at 09:06

## 2023-06-30 RX ADMIN — HYDROMORPHONE HYDROCHLORIDE 0.5 MG: 1 INJECTION, SOLUTION INTRAMUSCULAR; INTRAVENOUS; SUBCUTANEOUS at 12:06

## 2023-06-30 RX ADMIN — TIZANIDINE 4 MG: 4 TABLET ORAL at 06:06

## 2023-06-30 RX ADMIN — IPRATROPIUM BROMIDE AND ALBUTEROL SULFATE 3 ML: .5; 3 SOLUTION RESPIRATORY (INHALATION) at 01:06

## 2023-06-30 RX ADMIN — GUAIFENESIN 1200 MG: 600 TABLET, EXTENDED RELEASE ORAL at 08:06

## 2023-06-30 RX ADMIN — MONTELUKAST 10 MG: 10 TABLET, FILM COATED ORAL at 08:06

## 2023-06-30 RX ADMIN — FLUTICASONE PROPIONATE 100 MCG: 50 SPRAY, METERED NASAL at 12:06

## 2023-06-30 RX ADMIN — ALPRAZOLAM 0.25 MG: 0.25 TABLET ORAL at 08:06

## 2023-06-30 RX ADMIN — DEXTROSE, SODIUM CHLORIDE, AND POTASSIUM CHLORIDE: 5; .45; .15 INJECTION INTRAVENOUS at 10:06

## 2023-06-30 RX ADMIN — GUAIFENESIN 1200 MG: 600 TABLET, EXTENDED RELEASE ORAL at 11:06

## 2023-06-30 RX ADMIN — CHLORHEXIDINE GLUCONATE 0.12% ORAL RINSE 10 ML: 1.2 LIQUID ORAL at 09:06

## 2023-06-30 RX ADMIN — PANTOPRAZOLE SODIUM 40 MG: 40 INJECTION, POWDER, FOR SOLUTION INTRAVENOUS at 08:06

## 2023-06-30 RX ADMIN — ACETAMINOPHEN 1000 MG: 500 TABLET ORAL at 06:06

## 2023-06-30 RX ADMIN — ENOXAPARIN SODIUM 40 MG: 40 INJECTION SUBCUTANEOUS at 09:06

## 2023-06-30 RX ADMIN — ONDANSETRON 8 MG: 2 INJECTION INTRAMUSCULAR; INTRAVENOUS at 05:06

## 2023-06-30 RX ADMIN — OXYCODONE HYDROCHLORIDE 20 MG: 5 TABLET ORAL at 06:06

## 2023-06-30 NOTE — CONSULTS
[facility])  [facility] Medicine  Consult Note    Patient Name: Hamzah Nicolas  MRN: [id number]  Admission Date: 6/28/2023  Hospital Length of Stay: 2 days  Attending Physician: Marcus Hutchinson MD   Primary Care Provider: Lali Hatch MD           Patient information was obtained from patient, past medical records and ER records.     Consults  Subjective:     Principal Problem: Incarcerated ventral hernia    Chief Complaint:   Chief Complaint   Patient presents with    Abdominal Pain     Per EMS, patient reports bulging to abdomen with pain, also c/o nausea, vomiting, and SOB due to pain, patient reports increase in size over the past few days        HPI: The patient is a 58-year-old female with a past medical history notable for allergic sinusitis, rotator cuff tendonitis with subacromial impingement, lumbar disc disease with right lumbar radiculitis who presented with a 5-day history of an increasingly tender and red umbilical bulge. Her discomfort escalated, accompanied by nausea and vomiting, leading her to abstain from eating or drinking over the past few days and eventually seek emergency care. Upon arrival at the emergency room, work up revealed an incarcerated ventral hernia and underwent an open incisional hernia repair with mesh, in addition to a partial omentectomy (6/29/2023). Post-operative care included pain management, diet advancement, use of an incentive spirometer, application of a pneumatic compression device, administration of Lovenox, and physical therapy consultation. On 6/30, she developed acute respiratory failur. In light of these developments, Hospital Medicine was consulted to evaluate.    The time of our encounter, patient, despite appearing notably uncomfortable, she was not in acute distress and only complained of reflux while being maintained on 3-4L nasal cannula.  Upon further inquiring of problem, noted that her symptoms were triggered by an episode of  persistent nausea and intractable vomiting, raising concerns about aspiration. Denies any fevers, chills, chest pain, or shortness a breath at this time. Also denies any recent sick contacts or recent travel.      Past Medical History:   Diagnosis Date    Anxiety     Back pain     Depression        Past Surgical History:   Procedure Laterality Date    CHOLECYSTECTOMY      HYSTERECTOMY      OMENTECTOMY N/A 6/28/2023    Procedure: OMENTECTOMY;  Surgeon: Marcus Hutchinson MD;  Location: [facility] OR;  Service: General;  Laterality: N/A;    REPAIR OF INCARCERATED UMBILICAL HERNIA N/A 6/28/2023    Procedure: REPAIR, HERNIA, UMBILICAL, INCARCERATED, AGE 5 YEARS OR OLDER;  Surgeon: Marcus Hutchinson MD;  Location: [facility] OR;  Service: General;  Laterality: N/A;       Review of patient's allergies indicates:   Allergen Reactions    Opioids - morphine analogues        No current facility-administered medications on file prior to encounter.     Current Outpatient Medications on File Prior to Encounter   Medication Sig    alprazolam (XANAX) 0.25 MG tablet Take 0.25 mg by mouth 3 (three) times daily.    cetirizine (ZYRTEC) 10 MG tablet Take 10 mg by mouth once daily.    FLUOXETINE HCL (PROZAC ORAL) Take 60 mg by mouth once daily.    MELOXICAM (MOBIC ORAL) Take by mouth.    montelukast (SINGULAIR) 10 mg tablet Take 10 mg by mouth every evening.    TIZANIDINE HCL (ZANAFLEX ORAL) Take by mouth.    diclofenac (VOLTAREN) 50 MG EC tablet Take 1 tablet (50 mg total) by mouth 2 (two) times daily as needed.     Family History    None       Tobacco Use    Smoking status: Every Day     Packs/day: 0.50     Types: Cigarettes    Smokeless tobacco: Never   Substance and Sexual Activity    Alcohol use: No    Drug use: No    Sexual activity: Not on file     Review of Systems   Constitutional:  Negative for chills, fatigue and fever.   HENT:  Negative for congestion, postnasal drip, rhinorrhea and sore throat.    Eyes:  Negative for pain and visual  disturbance.   Respiratory:  Positive for shortness of breath. Negative for cough, chest tightness and wheezing.    Cardiovascular:  Positive for palpitations. Negative for chest pain and leg swelling.   Gastrointestinal:  Positive for nausea and vomiting. Negative for abdominal distention, abdominal pain, constipation and diarrhea.   Genitourinary:  Negative for difficulty urinating, flank pain and hematuria.   Musculoskeletal:  Negative for arthralgias, back pain, gait problem and joint swelling.   Skin:  Negative for pallor and rash.   Neurological:  Negative for dizziness, syncope and weakness.   Psychiatric/Behavioral:  Negative for confusion, decreased concentration and suicidal ideas.    Objective:     Vital Signs (Most Recent):  Temp: 98 °F (36.7 °C) (06/30/23 0747)  Pulse: 100 (06/30/23 0747)  Resp: 20 (06/30/23 0747)  BP: 115/78 (06/30/23 0747)  SpO2: (!) 94 % (06/30/23 0747) Vital Signs (24h Range):  Temp:  [98 °F (36.7 °C)-99.2 °F (37.3 °C)] 98 °F (36.7 °C)  Pulse:  [] 100  Resp:  [16-20] 20  SpO2:  [92 %-94 %] 94 %  BP: (110-124)/(63-78) 115/78     Weight: 108.6 kg (239 lb 6.7 oz)  Body mass index is 42.41 kg/m².     Physical Exam  Vitals reviewed.   Constitutional:       General: She is not in acute distress.     Appearance: She is obese. She is ill-appearing. She is not toxic-appearing.   HENT:      Head: Normocephalic and atraumatic.      Nose: Nose normal. No congestion or rhinorrhea.   Eyes:      Extraocular Movements: Extraocular movements intact.      Conjunctiva/sclera: Conjunctivae normal.      Pupils: Pupils are equal, round, and reactive to light.   Cardiovascular:      Rate and Rhythm: Normal rate and regular rhythm.      Pulses: Normal pulses.      Heart sounds: No murmur heard.  Pulmonary:      Effort: Pulmonary effort is normal. No respiratory distress.      Breath sounds: Examination of the right-lower field reveals decreased breath sounds. Examination of the left-lower field  reveals decreased breath sounds. Decreased breath sounds present. No wheezing.   Abdominal:      General: Abdomen is flat. Bowel sounds are normal. There is no distension.      Palpations: Abdomen is soft.      Tenderness: There is generalized abdominal tenderness. There is no guarding or rebound.   Musculoskeletal:         General: No swelling, tenderness or deformity. Normal range of motion.      Cervical back: Normal range of motion and neck supple. No rigidity.   Skin:     General: Skin is warm and dry.      Capillary Refill: Capillary refill takes less than 2 seconds.      Coloration: Skin is not pale.   Neurological:      General: No focal deficit present.      Mental Status: She is alert and oriented to person, place, and time. Mental status is at baseline.      Motor: No weakness.      Gait: Gait normal.   Psychiatric:         Mood and Affect: Mood normal.         Behavior: Behavior normal.         Thought Content: Thought content normal.        Significant Labs: BMP:   Recent Labs   Lab 06/29/23  0538   GLU 83  83     136   K 4.1  4.1   CL 99  99   CO2 27  27   BUN 25*  25*   CREATININE 0.8  0.8   CALCIUM 9.1  9.1     CBC:   Recent Labs   Lab 06/29/23  0538   WBC 11.73   HGB 14.3   HCT 45.3        CMP:   Recent Labs   Lab 06/29/23  0538     136   K 4.1  4.1   CL 99  99   CO2 27  27   GLU 83  83   BUN 25*  25*   CREATININE 0.8  0.8   CALCIUM 9.1  9.1   ANIONGAP 10  10     Cardiac Markers: No results for input(s): CKMB, MYOGLOBIN, BNP, TROPISTAT in the last 48 hours.  Coagulation: No results for input(s): PT, INR, APTT in the last 48 hours.  Lactic Acid: No results for input(s): LACTATE in the last 48 hours.  Magnesium: No results for input(s): MG in the last 48 hours.  Troponin: No results for input(s): TROPONINI, TROPONINIHS in the last 48 hours.  TSH: No results for input(s): TSH in the last 4320 hours.  Urine Studies:   No results for input(s): COLORU, APPEARANCEUA,  PHUR, SPECGRAV, PROTEINUA, GLUCUA, KETONESU, BILIRUBINUA, OCCULTUA, NITRITE, UROBILINOGEN, LEUKOCYTESUR, RBCUA, WBCUA, BACTERIA, SQUAMEPITHEL, HYALINECASTS in the last 48 hours.    Invalid input(s): WEI      Significant Imaging:   Imaging Results              X-Ray Chest AP Portable (Final result)  Result time 06/28/23 09:26:23      Final result by Dung Ruano III, MD (06/28/23 09:26:23)                   Impression:      No acute abnormality.      Electronically signed by: Jesus Ruano  Date:    06/28/2023  Time:    09:26               Narrative:    EXAMINATION:  XR CHEST AP PORTABLE    CLINICAL HISTORY:  Chest Pain;.    TECHNIQUE:  Single frontal portable view of the chest was performed.    COMPARISON:  None    FINDINGS:  Support devices: None    The lungs are clear, with normal appearance of pulmonary vasculature and no pleural effusion or pneumothorax.    The cardiac silhouette is normal in size. The hilar and mediastinal contours are unremarkable.    Bones are intact.                                        Assessment/Plan:     * Incarcerated ventral hernia  --management per primary      Intractable vomiting with nausea  --has had  600 mL of black/brown emesis since this morning per nursing  --holding lovenox therapy  --IV protonix frequency increased to BID   --stat CBC ordered, will trend Q6H  --monitor closely    Acute hypoxemic respiratory failure  --ddx strongly suggestive of aspiration pneumonia,however, dimer did return elevated, will obtain CTA Chest to assess for PE.   --O2 requirements: 4L NC, saturating in the low-to-mid 90s  --CXR manually reviewed, no obvious consolidation  --IV Unasyn started  --supportive therapy (mucinex, flonase, albuterol INH QID)  --wean o2 as tolerated      VTE Risk Mitigation (From admission, onward)           Ordered     IP VTE HIGH RISK PATIENT  Once         06/28/23 1414     Place sequential compression device  Until discontinued         06/28/23  2229                        Thank you for your consult. I will follow-up with patient. Please contact us if you have any additional questions.    Bo Magaña MD  Department of Hospital Medicine   '[facility] Tele[facility] ([facility])

## 2023-06-30 NOTE — SUBJECTIVE & OBJECTIVE
Past Medical History:   Diagnosis Date    Anxiety     Back pain     Depression        Past Surgical History:   Procedure Laterality Date    CHOLECYSTECTOMY      HYSTERECTOMY      OMENTECTOMY N/A 6/28/2023    Procedure: OMENTECTOMY;  Surgeon: Marcus Hutchinson MD;  Location: [facility] OR;  Service: General;  Laterality: N/A;    REPAIR OF INCARCERATED UMBILICAL HERNIA N/A 6/28/2023    Procedure: REPAIR, HERNIA, UMBILICAL, INCARCERATED, AGE 5 YEARS OR OLDER;  Surgeon: Marcus Hutchinson MD;  Location: [facility] OR;  Service: General;  Laterality: N/A;       Review of patient's allergies indicates:   Allergen Reactions    Opioids - morphine analogues        No current facility-administered medications on file prior to encounter.     Current Outpatient Medications on File Prior to Encounter   Medication Sig    alprazolam (XANAX) 0.25 MG tablet Take 0.25 mg by mouth 3 (three) times daily.    cetirizine (ZYRTEC) 10 MG tablet Take 10 mg by mouth once daily.    FLUOXETINE HCL (PROZAC ORAL) Take 60 mg by mouth once daily.    MELOXICAM (MOBIC ORAL) Take by mouth.    montelukast (SINGULAIR) 10 mg tablet Take 10 mg by mouth every evening.    TIZANIDINE HCL (ZANAFLEX ORAL) Take by mouth.    diclofenac (VOLTAREN) 50 MG EC tablet Take 1 tablet (50 mg total) by mouth 2 (two) times daily as needed.     Family History    None       Tobacco Use    Smoking status: Every Day     Packs/day: 0.50     Types: Cigarettes    Smokeless tobacco: Never   Substance and Sexual Activity    Alcohol use: No    Drug use: No    Sexual activity: Not on file     Review of Systems   Constitutional:  Negative for chills, fatigue and fever.   HENT:  Negative for congestion, postnasal drip, rhinorrhea and sore throat.    Eyes:  Negative for pain and visual disturbance.   Respiratory:  Positive for shortness of breath. Negative for cough, chest tightness and wheezing.    Cardiovascular:  Positive for palpitations. Negative for chest pain and leg swelling.    Gastrointestinal:  Positive for nausea and vomiting. Negative for abdominal distention, abdominal pain, constipation and diarrhea.   Genitourinary:  Negative for difficulty urinating, flank pain and hematuria.   Musculoskeletal:  Negative for arthralgias, back pain, gait problem and joint swelling.   Skin:  Negative for pallor and rash.   Neurological:  Negative for dizziness, syncope and weakness.   Psychiatric/Behavioral:  Negative for confusion, decreased concentration and suicidal ideas.    Objective:     Vital Signs (Most Recent):  Temp: 98 °F (36.7 °C) (06/30/23 0747)  Pulse: 100 (06/30/23 0747)  Resp: 20 (06/30/23 0747)  BP: 115/78 (06/30/23 0747)  SpO2: (!) 94 % (06/30/23 0747) Vital Signs (24h Range):  Temp:  [98 °F (36.7 °C)-99.2 °F (37.3 °C)] 98 °F (36.7 °C)  Pulse:  [] 100  Resp:  [16-20] 20  SpO2:  [92 %-94 %] 94 %  BP: (110-124)/(63-78) 115/78     Weight: 108.6 kg (239 lb 6.7 oz)  Body mass index is 42.41 kg/m².     Physical Exam  Vitals reviewed.   Constitutional:       General: She is not in acute distress.     Appearance: She is obese. She is ill-appearing. She is not toxic-appearing.   HENT:      Head: Normocephalic and atraumatic.      Nose: Nose normal. No congestion or rhinorrhea.   Eyes:      Extraocular Movements: Extraocular movements intact.      Conjunctiva/sclera: Conjunctivae normal.      Pupils: Pupils are equal, round, and reactive to light.   Cardiovascular:      Rate and Rhythm: Normal rate and regular rhythm.      Pulses: Normal pulses.      Heart sounds: No murmur heard.  Pulmonary:      Effort: Pulmonary effort is normal. No respiratory distress.      Breath sounds: Examination of the right-lower field reveals decreased breath sounds. Examination of the left-lower field reveals decreased breath sounds. Decreased breath sounds present. No wheezing.   Abdominal:      General: Abdomen is flat. Bowel sounds are normal. There is no distension.      Palpations: Abdomen is soft.       Tenderness: There is generalized abdominal tenderness. There is no guarding or rebound.   Musculoskeletal:         General: No swelling, tenderness or deformity. Normal range of motion.      Cervical back: Normal range of motion and neck supple. No rigidity.   Skin:     General: Skin is warm and dry.      Capillary Refill: Capillary refill takes less than 2 seconds.      Coloration: Skin is not pale.   Neurological:      General: No focal deficit present.      Mental Status: She is alert and oriented to person, place, and time. Mental status is at baseline.      Motor: No weakness.      Gait: Gait normal.   Psychiatric:         Mood and Affect: Mood normal.         Behavior: Behavior normal.         Thought Content: Thought content normal.        Significant Labs: BMP:   Recent Labs   Lab 06/29/23  0538   GLU 83  83     136   K 4.1  4.1   CL 99  99   CO2 27  27   BUN 25*  25*   CREATININE 0.8  0.8   CALCIUM 9.1  9.1     CBC:   Recent Labs   Lab 06/29/23  0538   WBC 11.73   HGB 14.3   HCT 45.3        CMP:   Recent Labs   Lab 06/29/23  0538     136   K 4.1  4.1   CL 99  99   CO2 27  27   GLU 83  83   BUN 25*  25*   CREATININE 0.8  0.8   CALCIUM 9.1  9.1   ANIONGAP 10  10     Cardiac Markers: No results for input(s): CKMB, MYOGLOBIN, BNP, TROPISTAT in the last 48 hours.  Coagulation: No results for input(s): PT, INR, APTT in the last 48 hours.  Lactic Acid: No results for input(s): LACTATE in the last 48 hours.  Magnesium: No results for input(s): MG in the last 48 hours.  Troponin: No results for input(s): TROPONINI, TROPONINIHS in the last 48 hours.  TSH: No results for input(s): TSH in the last 4320 hours.  Urine Studies:   No results for input(s): COLORU, APPEARANCEUA, PHUR, SPECGRAV, PROTEINUA, GLUCUA, KETONESU, BILIRUBINUA, OCCULTUA, NITRITE, UROBILINOGEN, LEUKOCYTESUR, RBCUA, WBCUA, BACTERIA, SQUAMEPITHEL, HYALINECASTS in the last 48 hours.    Invalid input(s):  [facility]      Significant Imaging:   Imaging Results              X-Ray Chest AP Portable (Final result)  Result time 06/28/23 09:26:23      Final result by Dung Ruano III, MD (06/28/23 09:26:23)                   Impression:      No acute abnormality.      Electronically signed by: Jesus Ruano  Date:    06/28/2023  Time:    09:26               Narrative:    EXAMINATION:  XR CHEST AP PORTABLE    CLINICAL HISTORY:  Chest Pain;.    TECHNIQUE:  Single frontal portable view of the chest was performed.    COMPARISON:  None    FINDINGS:  Support devices: None    The lungs are clear, with normal appearance of pulmonary vasculature and no pleural effusion or pneumothorax.    The cardiac silhouette is normal in size. The hilar and mediastinal contours are unremarkable.    Bones are intact.

## 2023-06-30 NOTE — PLAN OF CARE
Problem: Adult Inpatient Plan of Care  Goal: Plan of Care Review  Outcome: Ongoing, Progressing  Goal: Absence of Hospital-Acquired Illness or Injury  Outcome: Ongoing, Progressing  Intervention: Prevent and Manage VTE (Venous Thromboembolism) Risk  Flowsheets (Taken 6/29/2023 1915)  VTE Prevention/Management:   dorsiflexion/plantar flexion performed   intravenous hydration  Intervention: Prevent Infection  Flowsheets (Taken 6/29/2023 1915)  Infection Prevention:   environmental surveillance performed   personal protective equipment utilized   rest/sleep promoted   equipment surfaces disinfected   hand hygiene promoted   single patient room provided  Goal: Optimal Comfort and Wellbeing  Outcome: Ongoing, Progressing  Intervention: Monitor Pain and Promote Comfort  Flowsheets (Taken 6/29/2023 1915)  Pain Management Interventions:   care clustered   pillow support provided   quiet environment facilitated   pain management plan reviewed with patient/caregiver   position adjusted  Intervention: Provide Person-Centered Care  Flowsheets (Taken 6/29/2023 1915)  Trust Relationship/Rapport:   care explained   questions encouraged   choices provided   reassurance provided   emotional support provided   thoughts/feelings acknowledged   empathic listening provided   questions answered     Problem: Skin Injury Risk Increased  Goal: Skin Health and Integrity  Outcome: Ongoing, Progressing  Intervention: Promote and Optimize Oral Intake  Flowsheets (Taken 6/29/2023 1915)  Oral Nutrition Promotion: rest periods promoted     Problem: Infection  Goal: Absence of Infection Signs and Symptoms  Outcome: Ongoing, Progressing  Intervention: Prevent or Manage Infection  Flowsheets (Taken 6/29/2023 1915)  Fever Reduction/Comfort Measures:   lightweight bedding   lightweight clothing     Problem: Fall Injury Risk  Goal: Absence of Fall and Fall-Related Injury  Outcome: Ongoing, Progressing

## 2023-06-30 NOTE — HPI
The patient is a 58-year-old female with a past medical history notable for allergic sinusitis, rotator cuff tendonitis with subacromial impingement, lumbar disc disease with right lumbar radiculitis who presented with a 5-day history of an increasingly tender and red umbilical bulge. Her discomfort escalated, accompanied by nausea and vomiting, leading her to abstain from eating or drinking over the past few days and eventually seek emergency care. Upon arrival at the emergency room, work up revealed an incarcerated ventral hernia and underwent an open incisional hernia repair with mesh, in addition to a partial omentectomy (6/29/2023). Post-operative care included pain management, diet advancement, use of an incentive spirometer, application of a pneumatic compression device, administration of Lovenox, and physical therapy consultation. On 6/30, she developed acute respiratory failur. In light of these developments, Hospital Medicine was consulted to evaluate.    The time of our encounter, patient, despite appearing notably uncomfortable, she was not in acute distress and only complained of reflux while being maintained on 3-4L nasal cannula.  Upon further inquiring of problem, noted that her symptoms were triggered by an episode of persistent nausea and intractable vomiting, raising concerns about aspiration. Denies any fevers, chills, chest pain, or shortness a breath at this time. Also denies any recent sick contacts or recent travel.

## 2023-06-30 NOTE — PROGRESS NOTES
[facility]  General Surgery  Progress Note    Subjective:     History of Present Illness:  58-year-old female with a 5 day history of bulging at the umbilicus.  The area has become red and tender.  The pain increased and she presented to the emergency room.  She had associated nausea and vomiting She has not had anything to eat and drink in a few days.  She denies any fever or chills.  She is not on any anticoagulation medicines.      Patient was evaluated in the emergency room and a urgent surgical consult was obtained.      Post-Op Info:  Procedure(s) (LRB):  REPAIR, HERNIA, UMBILICAL, INCARCERATED, AGE 5 YEARS OR OLDER (N/A)  OMENTECTOMY (N/A)   2 Days Post-Op     Interval History:  Patient complains of nausea and vomiting reflux and incisional pain.  Will change IV fluids and increased rate.  Convert to mostly oral medications.  IV Protonix, Maalox PRN.    Medications:  Continuous Infusions:   dextrose 5 % and 0.45 % NaCl with KCl 20 mEq       Scheduled Meds:   acetaminophen  1,000 mg Oral Q8H    ALPRAZolam  0.25 mg Oral TID    chlorhexidine  10 mL Mouth/Throat BID    enoxparin  40 mg Subcutaneous Q12H (prophylaxis, 0900/2100)    FLUoxetine  40 mg Oral Daily    montelukast  10 mg Oral QHS    pantoprazole  40 mg Intravenous Daily     PRN Meds:diphenhydrAMINE, HYDROmorphone, HYDROmorphone, metoclopramide HCl, ondansetron     Review of patient's allergies indicates:   Allergen Reactions    Opioids - morphine analogues      Objective:     Vital Signs (Most Recent):  Temp: 98.2 °F (36.8 °C) (06/30/23 0547)  Pulse: 88 (06/30/23 0747)  Resp: 20 (06/30/23 0747)  BP: 110/63 (06/30/23 0547)  SpO2: (!) 94 % (06/30/23 0747) Vital Signs (24h Range):  Temp:  [98 °F (36.7 °C)-99.2 °F (37.3 °C)] 98.2 °F (36.8 °C)  Pulse:  [73-98] 88  Resp:  [16-30] 20  SpO2:  [92 %-95 %] 94 %  BP: (110-124)/(63-73) 110/63     Weight: 108.6 kg (239 lb 6.7 oz)  Body mass index is 42.41 kg/m².    Intake/Output - Last 3  Shifts         06/28 0700 06/29 0659 06/29 0700  06/30 0659 06/30 0700  07/01 0659    P.O. 240      I.V. (mL/kg) 1598.2 (14.7) 1955.3 (18)     IV Piggyback 1500 48.9     Total Intake(mL/kg) 3338.2 (30.7) 2004.2 (18.5)     Urine (mL/kg/hr) 1230 0 (0)     Emesis/NG output  500     Drains 85 20     Blood 40      Total Output 1355 520     Net +1983.2 +1484.2            Urine Occurrence  3 x              Physical Exam  Vitals and nursing note reviewed.   Constitutional:       Appearance: She is well-developed.   HENT:      Head: Normocephalic.   Eyes:      Pupils: Pupils are equal, round, and reactive to light.   Neck:      Thyroid: No thyromegaly.      Vascular: No JVD.      Trachea: No tracheal deviation.   Cardiovascular:      Rate and Rhythm: Normal rate and regular rhythm.      Heart sounds: Normal heart sounds.   Pulmonary:      Breath sounds: Normal breath sounds. No wheezing.   Abdominal:      General: Bowel sounds are normal. There is distension.      Palpations: Abdomen is soft. Abdomen is not rigid. There is no mass.      Tenderness: There is abdominal tenderness (Incisional). There is no guarding or rebound.      Comments: Obese.  Incision clean.  Erythema is nearly resolved.  Drain is serous   Musculoskeletal:         General: Normal range of motion.      Right lower leg: No edema.      Left lower leg: No edema.   Lymphadenopathy:      Cervical: No cervical adenopathy.   Skin:     General: Skin is warm and dry.      Findings: No erythema or rash.   Neurological:      Mental Status: She is alert and oriented to person, place, and time.   Psychiatric:         Mood and Affect: Mood normal.         Behavior: Behavior normal.         Thought Content: Thought content normal.         Judgment: Judgment normal.        Significant Labs:  I have reviewed all pertinent lab results within the past 24 hours.  No new    Significant Diagnostics:  I have reviewed all pertinent imaging results/findings within the past 24  hours.  No new    Assessment/Plan:     * Incarcerated ventral hernia  Open incisional hernia repair with mesh, partial omentectomy 06/20/2023   Incisional pain  Developed nausea and vomiting   Reflux.      Returned to NPO   Change IV fluids to D5   Incentive spirometer  SCDs  Lovenox   Reflux medications   Check x-rays in the morning  Advanced diet as bowel function begins to improve    BMI 35.0-35.9,adult  Increases the risk of recurrence and wound complications        Marcus Hutchinson MD  General Surgery  [facility] - Telemetry ([facility])

## 2023-06-30 NOTE — PROGRESS NOTES
Pharmacist Renal Dose Adjustment Note    Hamzah Nicolas is a 58 y.o. female being treated with the medication Unasyn.    Patient Data:    Vital Signs (Most Recent):  Temp: 98 °F (36.7 °C) (06/30/23 0747)  Pulse: (!) 112 (06/30/23 0920)  Resp: 20 (06/30/23 0747)  BP: 115/78 (06/30/23 0747)  SpO2: (!) 94 % (06/30/23 0747) Vital Signs (72h Range):  Temp:  [97.6 °F (36.4 °C)-99.2 °F (37.3 °C)]   Pulse:  []   Resp:  [16-30]   BP: (110-162)/(63-90)   SpO2:  [92 %-99 %]      Recent Labs   Lab 06/28/23  0939 06/29/23  0538   CREATININE 1.5* 0.8  0.8     Serum creatinine: 0.8 mg/dL 06/29/23 0538  Estimated creatinine clearance: 89.5 mL/min    Unasyn  1.5 g every 6 hours was adjusted to 3g every 6 hours according to [facility]'s renal dose adjustment policy      Pharmacist's Name: Radha Dominique PharmD 6/30/2023 11:02 AM    Pharmacist's Extension: [phone]

## 2023-06-30 NOTE — PT/OT/SLP PROGRESS
Physical Therapy      Patient Name:  Hamzah Nicolas   MRN:  [id number]    1044 P.T. COMPLETED CHART REVIEW AND SPOKE WITH EMA. NURSE JUAREZ REQUESTED HOLD FOR P.T. AS PT IS NAUSEATED AND VOMITING. P.T. TO COMPLETE EVAL AND TX NEXT VISIT. THANK YOU Joy Monroe, PT,6/30/2023

## 2023-06-30 NOTE — ASSESSMENT & PLAN NOTE
--has had  600 mL of black/brown emesis since this morning per nursing  --holding lovenox therapy  --IV protonix frequency increased to BID   --stat CBC ordered, will trend Q6H  --monitor closely

## 2023-06-30 NOTE — PLAN OF CARE
A230/A230 ZAINAB Nicolas is a 58 y.o.female admitted on 6/28/2023 for Incarcerated ventral hernia   Code Status: Full Code MRN: [id number]   Review of patient's allergies indicates:   Allergen Reactions    Opioids - morphine analogues      Past Medical History:   Diagnosis Date    Anxiety     Back pain     Depression       PRN meds    albuterol-ipratropium, 3 mL, Q6H PRN  aluminum-magnesium hydroxide-simethicone, 30 mL, Q6H PRN  diphenhydrAMINE, 25 mg, Q6H PRN  HYDROmorphone, 0.5 mg, Q3H PRN  HYDROmorphone, 1 mg, Q4H PRN  metoclopramide HCl, 5 mg, Q6H PRN  ondansetron, 8 mg, Q8H PRN      Pt had multiple episodes of nausea/vomiting, increased shortness of breath and wheezing. Provider notified. MAR updated and Hospital medicine consulted. Labs collected. D. Dimer elevated. Dr. Magaña notified and CT ordered. Pt refused NG tube. Dr. Hutchinson, Dr. Magaña, and Stacey CHUA notified.  No falls reported on shift. Pt walked with assistance. Cardiac monitoring continues and hourly rounding is complete. Chart check completed. Will continue plan of care.      Orientation: oriented x 4        Lead Monitored: Lead II Rhythm: sinus tachycardia    Cardiac/Telemetry Box Number: [id number]  VTE Required Core Measure: (SCDs) Sequential compression device initiated/maintained, Pharmacological prophylaxis initiated/maintained Last Bowel Movement: 08/28/23  Diet NPO Except for: Ice Chips, Medication, Sips with Medication  Voiding Characteristics: voids spontaneously without difficulty  Kaushik Score: 15  Fall Risk Score: 5  Accucheck []   Freq?      Lines/Drains/Airways       Drain  Duration                  Closed/Suction Drain 06/28/23 1231 Midline Abdomen Bulb 15 Fr. 2 days              Peripheral Intravenous Line  Duration                  Peripheral IV - Single Lumen 06/29/23 1058 Anterior;Left;Proximal Forearm 1 day

## 2023-06-30 NOTE — ASSESSMENT & PLAN NOTE
Open incisional hernia repair with mesh, partial omentectomy 06/20/2023   Incisional pain  Developed nausea and vomiting   Reflux.      Returned to NPO   Change IV fluids to D5   Incentive spirometer  SCDs  Lovenox   Reflux medications   Check x-rays in the morning  Advanced diet as bowel function begins to improve

## 2023-06-30 NOTE — ASSESSMENT & PLAN NOTE
--ddx strongly suggestive of aspiration pneumonia  --O2 requirements: 4L NC, saturating in the low-to-mid 90s  --CXR manually reviewed, no obvious consolidation  --IV Unasyn started  --supportive therapy (mucinex, flonase, albuterol INH QID)  --enhanced covid/isolation precautions  --reassess in the AM

## 2023-07-01 PROBLEM — N17.9 AKI (ACUTE KIDNEY INJURY): Status: ACTIVE | Noted: 2023-07-01

## 2023-07-01 PROBLEM — J69.0 ASPIRATION PNEUMONIA: Status: ACTIVE | Noted: 2023-07-01

## 2023-07-01 LAB
ALBUMIN SERPL BCP-MCNC: 2.2 G/DL (ref 3.5–5.2)
ALP SERPL-CCNC: 79 U/L (ref 55–135)
ALT SERPL W/O P-5'-P-CCNC: 15 U/L (ref 10–44)
ANION GAP SERPL CALC-SCNC: 12 MMOL/L (ref 8–16)
AST SERPL-CCNC: 14 U/L (ref 10–40)
BASOPHILS NFR BLD: 0 % (ref 0–1.9)
BILIRUB SERPL-MCNC: 1 MG/DL (ref 0.1–1)
BUN SERPL-MCNC: 42 MG/DL (ref 6–20)
CALCIUM SERPL-MCNC: 8.9 MG/DL (ref 8.7–10.5)
CHLORIDE SERPL-SCNC: 95 MMOL/L (ref 95–110)
CO2 SERPL-SCNC: 24 MMOL/L (ref 23–29)
CREAT SERPL-MCNC: 1.7 MG/DL (ref 0.5–1.4)
DIFFERENTIAL METHOD: ABNORMAL
EOSINOPHIL NFR BLD: 0 % (ref 0–8)
ERYTHROCYTE [DISTWIDTH] IN BLOOD BY AUTOMATED COUNT: 15.4 % (ref 11.5–14.5)
EST. GFR  (NO RACE VARIABLE): 34 ML/MIN/1.73 M^2
GLUCOSE SERPL-MCNC: 119 MG/DL (ref 70–110)
HCT VFR BLD AUTO: 41.7 % (ref 37–48.5)
HGB BLD-MCNC: 13.1 G/DL (ref 12–16)
IMM GRANULOCYTES # BLD AUTO: ABNORMAL K/UL (ref 0–0.04)
IMM GRANULOCYTES NFR BLD AUTO: ABNORMAL % (ref 0–0.5)
LYMPHOCYTES NFR BLD: 12 % (ref 18–48)
MCH RBC QN AUTO: 26.5 PG (ref 27–31)
MCHC RBC AUTO-ENTMCNC: 31.4 G/DL (ref 32–36)
MCV RBC AUTO: 84 FL (ref 82–98)
MONOCYTES NFR BLD: 3 % (ref 4–15)
NEUTROPHILS NFR BLD: 68 % (ref 38–73)
NEUTS BAND NFR BLD MANUAL: 17 %
NRBC BLD-RTO: 0 /100 WBC
PLATELET # BLD AUTO: 282 K/UL (ref 150–450)
PLATELET BLD QL SMEAR: ABNORMAL
PMV BLD AUTO: 8.7 FL (ref 9.2–12.9)
POTASSIUM SERPL-SCNC: 4.4 MMOL/L (ref 3.5–5.1)
PROT SERPL-MCNC: 6.1 G/DL (ref 6–8.4)
RBC # BLD AUTO: 4.95 M/UL (ref 4–5.4)
SODIUM SERPL-SCNC: 131 MMOL/L (ref 136–145)
WBC # BLD AUTO: 10.72 K/UL (ref 3.9–12.7)

## 2023-07-01 PROCEDURE — 25000003 PHARM REV CODE 250: Performed by: COLON & RECTAL SURGERY

## 2023-07-01 PROCEDURE — 94761 N-INVAS EAR/PLS OXIMETRY MLT: CPT

## 2023-07-01 PROCEDURE — 27100171 HC OXYGEN HIGH FLOW UP TO 24 HOURS

## 2023-07-01 PROCEDURE — 21400001 HC TELEMETRY ROOM

## 2023-07-01 PROCEDURE — 63600175 PHARM REV CODE 636 W HCPCS: Performed by: STUDENT IN AN ORGANIZED HEALTH CARE EDUCATION/TRAINING PROGRAM

## 2023-07-01 PROCEDURE — S5010 5% DEXTROSE AND 0.45% SALINE: HCPCS | Performed by: NURSE PRACTITIONER

## 2023-07-01 PROCEDURE — 25000003 PHARM REV CODE 250: Performed by: SURGERY

## 2023-07-01 PROCEDURE — 94664 DEMO&/EVAL PT USE INHALER: CPT

## 2023-07-01 PROCEDURE — 63600175 PHARM REV CODE 636 W HCPCS: Performed by: SURGERY

## 2023-07-01 PROCEDURE — 25000242 PHARM REV CODE 250 ALT 637 W/ HCPCS: Performed by: NURSE PRACTITIONER

## 2023-07-01 PROCEDURE — 99024 POSTOP FOLLOW-UP VISIT: CPT | Mod: ,,, | Performed by: COLON & RECTAL SURGERY

## 2023-07-01 PROCEDURE — 99900035 HC TECH TIME PER 15 MIN (STAT)

## 2023-07-01 PROCEDURE — S5010 5% DEXTROSE AND 0.45% SALINE: HCPCS | Performed by: COLON & RECTAL SURGERY

## 2023-07-01 PROCEDURE — 25000003 PHARM REV CODE 250: Performed by: NURSE PRACTITIONER

## 2023-07-01 PROCEDURE — C9113 INJ PANTOPRAZOLE SODIUM, VIA: HCPCS | Performed by: STUDENT IN AN ORGANIZED HEALTH CARE EDUCATION/TRAINING PROGRAM

## 2023-07-01 PROCEDURE — 36415 COLL VENOUS BLD VENIPUNCTURE: CPT | Performed by: INTERNAL MEDICINE

## 2023-07-01 PROCEDURE — 80053 COMPREHEN METABOLIC PANEL: CPT | Performed by: INTERNAL MEDICINE

## 2023-07-01 PROCEDURE — 94799 UNLISTED PULMONARY SVC/PX: CPT

## 2023-07-01 PROCEDURE — 85027 COMPLETE CBC AUTOMATED: CPT | Performed by: INTERNAL MEDICINE

## 2023-07-01 PROCEDURE — 99024 PR POST-OP FOLLOW-UP VISIT: ICD-10-PCS | Mod: ,,, | Performed by: COLON & RECTAL SURGERY

## 2023-07-01 PROCEDURE — 25000003 PHARM REV CODE 250: Performed by: STUDENT IN AN ORGANIZED HEALTH CARE EDUCATION/TRAINING PROGRAM

## 2023-07-01 PROCEDURE — 94640 AIRWAY INHALATION TREATMENT: CPT

## 2023-07-01 PROCEDURE — 85007 BL SMEAR W/DIFF WBC COUNT: CPT | Performed by: INTERNAL MEDICINE

## 2023-07-01 RX ORDER — DEXTROSE MONOHYDRATE AND SODIUM CHLORIDE 5; .45 G/100ML; G/100ML
INJECTION, SOLUTION INTRAVENOUS CONTINUOUS
Status: DISCONTINUED | OUTPATIENT
Start: 2023-07-01 | End: 2023-07-03

## 2023-07-01 RX ADMIN — CHLORHEXIDINE GLUCONATE 0.12% ORAL RINSE 10 ML: 1.2 LIQUID ORAL at 08:07

## 2023-07-01 RX ADMIN — ALPRAZOLAM 0.25 MG: 0.25 TABLET ORAL at 08:07

## 2023-07-01 RX ADMIN — PANTOPRAZOLE SODIUM 40 MG: 40 INJECTION, POWDER, FOR SOLUTION INTRAVENOUS at 10:07

## 2023-07-01 RX ADMIN — ALPRAZOLAM 0.25 MG: 0.25 TABLET ORAL at 03:07

## 2023-07-01 RX ADMIN — MONTELUKAST 10 MG: 10 TABLET, FILM COATED ORAL at 10:07

## 2023-07-01 RX ADMIN — IPRATROPIUM BROMIDE AND ALBUTEROL SULFATE 3 ML: 2.5; .5 SOLUTION RESPIRATORY (INHALATION) at 07:07

## 2023-07-01 RX ADMIN — FLUOXETINE 40 MG: 20 CAPSULE ORAL at 08:07

## 2023-07-01 RX ADMIN — AMPICILLIN SODIUM AND SULBACTAM SODIUM 3 G: 2; 1 INJECTION, POWDER, FOR SOLUTION INTRAMUSCULAR; INTRAVENOUS at 01:07

## 2023-07-01 RX ADMIN — AMPICILLIN SODIUM AND SULBACTAM SODIUM 3 G: 2; 1 INJECTION, POWDER, FOR SOLUTION INTRAMUSCULAR; INTRAVENOUS at 05:07

## 2023-07-01 RX ADMIN — FLUTICASONE PROPIONATE 100 MCG: 50 SPRAY, METERED NASAL at 08:07

## 2023-07-01 RX ADMIN — PANTOPRAZOLE SODIUM 40 MG: 40 INJECTION, POWDER, FOR SOLUTION INTRAVENOUS at 09:07

## 2023-07-01 RX ADMIN — DEXTROSE AND SODIUM CHLORIDE: 5; 450 INJECTION, SOLUTION INTRAVENOUS at 06:07

## 2023-07-01 RX ADMIN — GUAIFENESIN 1200 MG: 600 TABLET, EXTENDED RELEASE ORAL at 10:07

## 2023-07-01 RX ADMIN — CHLORHEXIDINE GLUCONATE 0.12% ORAL RINSE 10 ML: 1.2 LIQUID ORAL at 10:07

## 2023-07-01 RX ADMIN — GUAIFENESIN 1200 MG: 600 TABLET, EXTENDED RELEASE ORAL at 08:07

## 2023-07-01 RX ADMIN — DEXTROSE AND SODIUM CHLORIDE: 5; 450 INJECTION, SOLUTION INTRAVENOUS at 07:07

## 2023-07-01 RX ADMIN — IPRATROPIUM BROMIDE AND ALBUTEROL SULFATE 3 ML: 2.5; .5 SOLUTION RESPIRATORY (INHALATION) at 12:07

## 2023-07-01 RX ADMIN — ALPRAZOLAM 0.25 MG: 0.25 TABLET ORAL at 10:07

## 2023-07-01 RX ADMIN — IPRATROPIUM BROMIDE AND ALBUTEROL SULFATE 3 ML: 2.5; .5 SOLUTION RESPIRATORY (INHALATION) at 01:07

## 2023-07-01 NOTE — PROGRESS NOTES
[facility] - Telemetry ([facility])  General Surgery  Progress Note    Subjective:     Interval History: Feels slightly better today. Minor nausea but denies emesis today.     Medications:  Continuous Infusions:   dextrose 5 % and 0.45 % NaCl 100 mL/hr at 07/01/23 0616     Scheduled Meds:   albuterol-ipratropium  3 mL Nebulization Q6H    ALPRAZolam  0.25 mg Oral TID    ampicillin-sulbactim (UNASYN) IVPB  3 g Intravenous Q6H    chlorhexidine  10 mL Mouth/Throat BID    FLUoxetine  40 mg Oral Daily    fluticasone propionate  2 spray Each Nostril Daily    guaiFENesin  1,200 mg Oral BID    montelukast  10 mg Oral QHS    pantoprazole  40 mg Intravenous BID     PRN Meds:aluminum-magnesium hydroxide-simethicone, diphenhydrAMINE, HYDROmorphone, HYDROmorphone, metoclopramide HCl, ondansetron     Review of patient's allergies indicates:   Allergen Reactions    Opioids - morphine analogues      Objective:     Vital Signs (Most Recent):  Temp: 99.6 °F (37.6 °C) (07/01/23 1209)  Pulse: 99 (07/01/23 1209)  Resp: 19 (07/01/23 1209)  BP: (!) 117/57 (07/01/23 1209)  SpO2: (!) 94 % (07/01/23 1209) Vital Signs (24h Range):  Temp:  [98.5 °F (36.9 °C)-99.6 °F (37.6 °C)] 99.6 °F (37.6 °C)  Pulse:  [] 99  Resp:  [16-24] 19  SpO2:  [85 %-99 %] 94 %  BP: ()/(48-69) 117/57     Weight: 109.6 kg (241 lb 10 oz)  Body mass index is 42.8 kg/m².    Intake/Output - Last 3 Shifts         06/29 0700 06/30 0659 06/30 0700 07/01 0659 07/01 0700 07/02 0659    P.O.       I.V. (mL/kg) 1955.3 (18) 533.5 (4.9)     IV Piggyback 48.9 167.7     Total Intake(mL/kg) 2004.2 (18.5) 701.2 (6.4)     Urine (mL/kg/hr) 0 (0) 0 (0)     Emesis/NG output 500 900     Drains 20      Blood       Total Output 520 900     Net +1484.2 -198.8            Urine Occurrence 3 x 3 x              Physical Exam  Vitals and nursing note reviewed.   Constitutional:       Appearance: She is well-developed.   HENT:      Head: Normocephalic.   Eyes:      Pupils: Pupils  are equal, round, and reactive to light.   Neck:      Thyroid: No thyromegaly.      Vascular: No JVD.      Trachea: No tracheal deviation.   Cardiovascular:      Rate and Rhythm: Normal rate and regular rhythm.      Heart sounds: Normal heart sounds.   Pulmonary:      Breath sounds: Normal breath sounds. No wheezing.   Abdominal:      Palpations: Abdomen is not rigid.      Comments: Soft, mild distention; incision c/d/I without erythema or drainage; JARRED drain with serous output   Musculoskeletal:         General: Normal range of motion.      Right lower leg: No edema.      Left lower leg: No edema.   Lymphadenopathy:      Cervical: No cervical adenopathy.   Skin:     General: Skin is warm and dry.      Findings: No erythema or rash.   Neurological:      Mental Status: She is alert and oriented to person, place, and time.   Psychiatric:         Mood and Affect: Mood normal.         Behavior: Behavior normal.         Thought Content: Thought content normal.         Judgment: Judgment normal.        Significant Labs:  I have reviewed all pertinent lab results within the past 24 hours.  CBC:   Recent Labs   Lab 07/01/23  1104   WBC 10.72   RBC 4.95   HGB 13.1   HCT 41.7      MCV 84   MCH 26.5*   MCHC 31.4*     BMP:   Recent Labs   Lab 07/01/23  1104   *   *   K 4.4   CL 95   CO2 24   BUN 42*   CREATININE 1.7*   CALCIUM 8.9     CMP:   Recent Labs   Lab 07/01/23  1104   *   CALCIUM 8.9   ALBUMIN 2.2*   PROT 6.1   *   K 4.4   CO2 24   CL 95   BUN 42*   CREATININE 1.7*   ALKPHOS 79   ALT 15   AST 14   BILITOT 1.0     LFTs:   Recent Labs   Lab 07/01/23  1104   ALT 15   AST 14   ALKPHOS 79   BILITOT 1.0   PROT 6.1   ALBUMIN 2.2*       Significant Diagnostics:  I have reviewed all pertinent imaging results/findings within the past 24 hours.      Assessment/Plan:     * Incarcerated ventral hernia  S/p Open incisional hernia repair with mesh, partial omentectomy 06/20/2023     - cont NPO  - OOB,  ambulation encouraged  - IV meds  - IS 10xs/hr while awake  - PPx: SCDs, Lovenox   - Reflux medications   - await advanced diet until bowel function improves    BMI 35.0-35.9,adult  Encouraged weight loss        Carmelo Rodriguez MD  General Surgery  [facility] - Telemetry ([facility])

## 2023-07-01 NOTE — SUBJECTIVE & OBJECTIVE
Interval History:     Review of Systems   Constitutional:  Negative for chills, fatigue and fever.   HENT:  Negative for congestion, postnasal drip, rhinorrhea and sore throat.    Eyes:  Negative for pain and visual disturbance.   Respiratory:  Positive for shortness of breath. Negative for cough, chest tightness and wheezing.    Cardiovascular:  Negative for chest pain, palpitations and leg swelling.   Gastrointestinal:  Positive for abdominal distention. Negative for abdominal pain, constipation, diarrhea, nausea and vomiting.   Genitourinary:  Negative for difficulty urinating, flank pain and hematuria.   Musculoskeletal:  Negative for arthralgias, back pain, gait problem and joint swelling.   Skin:  Negative for pallor and rash.   Neurological:  Negative for dizziness, syncope and weakness.   Psychiatric/Behavioral:  Negative for confusion, decreased concentration and suicidal ideas.    Objective:     Vital Signs (Most Recent):  Temp: 99.6 °F (37.6 °C) (07/01/23 1209)  Pulse: 104 (07/01/23 1512)  Resp: (!) 24 (07/01/23 1319)  BP: (!) 117/57 (07/01/23 1209)  SpO2: (!) 92 % (07/01/23 1319) Vital Signs (24h Range):  Temp:  [98.5 °F (36.9 °C)-99.6 °F (37.6 °C)] 99.6 °F (37.6 °C)  Pulse:  [] 104  Resp:  [16-24] 24  SpO2:  [85 %-99 %] 92 %  BP: (102-117)/(48-69) 117/57     Weight: 109.6 kg (241 lb 10 oz)  Body mass index is 42.8 kg/m².    Intake/Output Summary (Last 24 hours) at 7/1/2023 1620  Last data filed at 7/1/2023 0822  Gross per 24 hour   Intake 701.21 ml   Output 15 ml   Net 686.21 ml         Physical Exam  Vitals and nursing note reviewed.   Constitutional:       Appearance: She is well-developed. She is ill-appearing.   HENT:      Head: Normocephalic.   Eyes:      Extraocular Movements: Extraocular movements intact.      Pupils: Pupils are equal, round, and reactive to light.   Neck:      Thyroid: No thyromegaly.      Vascular: No JVD.      Trachea: No tracheal deviation.   Cardiovascular:      Rate  and Rhythm: Normal rate and regular rhythm.      Heart sounds: Normal heart sounds.   Pulmonary:      Breath sounds: Rhonchi present. No wheezing.   Abdominal:      Palpations: Abdomen is not rigid.      Comments: Soft, mild distention; incision c/d/I without erythema or drainage; JARRED drain with serous output   Musculoskeletal:         General: Normal range of motion.      Right lower leg: No edema.      Left lower leg: No edema.   Lymphadenopathy:      Cervical: No cervical adenopathy.   Skin:     General: Skin is warm and dry.      Findings: No erythema or rash.   Neurological:      Mental Status: She is alert and oriented to person, place, and time.   Psychiatric:         Mood and Affect: Mood normal.         Behavior: Behavior normal.         Thought Content: Thought content normal.         Judgment: Judgment normal.           Significant Labs: All pertinent labs within the past 24 hours have been reviewed.  Recent Lab Results         07/01/23  1104   06/30/23 2032        Albumin 2.2         Alkaline Phosphatase 79         ALT 15         Anion Gap 12         AST 14         Bands 17.0         Basophil % 0.0         BILIRUBIN TOTAL 1.0  Comment: For infants and newborns, interpretation of results should be based  on gestational age, weight and in agreement with clinical  observations.    Premature Infant recommended reference ranges:  Up to 24 hours.............<8.0 mg/dL  Up to 48 hours............<12.0 mg/dL  3-5 days..................<15.0 mg/dL  6-29 days.................<15.0 mg/dL           BNP   45  Comment: Values of less than 100 pg/ml are consistent with non-CHF populations.       BUN 42         Calcium 8.9         Chloride 95         CO2 24         Creatinine 1.7         Differential Method Manual  Comment: CORRECTED RESULT; previously reported as Automated on 07/01/2023 at   11:29.    [C]         eGFR 34         Eosinophil % 0.0         Glucose 119         Gran % 68.0         Hematocrit 41.7          Hemoglobin 13.1         Immature Grans (Abs) CANCELED  Comment: Mild elevation in immature granulocytes is non specific and   can be seen in a variety of conditions including stress response,   acute inflammation, trauma and pregnancy. Correlation with other   laboratory and clinical findings is essential.    Result canceled by the ancillary.           Immature Granulocytes CANCELED  Comment: Result canceled by the ancillary.         Lymph % 12.0         MCH 26.5         MCHC 31.4         MCV 84         Mono % 3.0         MPV 8.7         nRBC 0         Platelet Estimate Appears normal         Platelets 282         Potassium 4.4         PROTEIN TOTAL 6.1         RBC 4.95         RDW 15.4         Sodium 131         WBC 10.72                  [C] - Corrected Result               Significant Imaging: I have reviewed all pertinent imaging results/findings within the past 24 hours.

## 2023-07-01 NOTE — HOSPITAL COURSE
7/1  She is now on  10 lt by nasal canula . CTA chest : No PE . Patchy infiltrates are seen within the lower lobes concerning for aspiration or airspace disease or edema process aspiration . Abd XR show ileus . Started on IVAB . The kidney function is trending up .  7/2 Kidney function back to normal . She cont on 10 lt by nasal canula . NAEON .   7/3 She Had a BM  over the last 24 hrs .   She is on 5 lt by NC . Diet advance per general surgery .  The IVAB changed to po .   7/4 She is tolerating po intake . NAEON . She is on 3 lt by nasal canula . She had multiple BM since yesterday .

## 2023-07-01 NOTE — PT/OT/SLP PROGRESS
Physical Therapy      Patient Name:  Hamzah Nicolas   MRN:  [id number]    Chart review performed, attempted but unable to remain aroused. Nsg staff assisted pt back to bed prior to PT arrival. Will follow-up as able/appropriate.

## 2023-07-01 NOTE — SUBJECTIVE & OBJECTIVE
Interval History: Feels slightly better today. Minor nausea but denies emesis today.     Medications:  Continuous Infusions:   dextrose 5 % and 0.45 % NaCl 100 mL/hr at 07/01/23 0616     Scheduled Meds:   albuterol-ipratropium  3 mL Nebulization Q6H    ALPRAZolam  0.25 mg Oral TID    ampicillin-sulbactim (UNASYN) IVPB  3 g Intravenous Q6H    chlorhexidine  10 mL Mouth/Throat BID    FLUoxetine  40 mg Oral Daily    fluticasone propionate  2 spray Each Nostril Daily    guaiFENesin  1,200 mg Oral BID    montelukast  10 mg Oral QHS    pantoprazole  40 mg Intravenous BID     PRN Meds:aluminum-magnesium hydroxide-simethicone, diphenhydrAMINE, HYDROmorphone, HYDROmorphone, metoclopramide HCl, ondansetron     Review of patient's allergies indicates:   Allergen Reactions    Opioids - morphine analogues      Objective:     Vital Signs (Most Recent):  Temp: 99.6 °F (37.6 °C) (07/01/23 1209)  Pulse: 99 (07/01/23 1209)  Resp: 19 (07/01/23 1209)  BP: (!) 117/57 (07/01/23 1209)  SpO2: (!) 94 % (07/01/23 1209) Vital Signs (24h Range):  Temp:  [98.5 °F (36.9 °C)-99.6 °F (37.6 °C)] 99.6 °F (37.6 °C)  Pulse:  [] 99  Resp:  [16-24] 19  SpO2:  [85 %-99 %] 94 %  BP: ()/(48-69) 117/57     Weight: 109.6 kg (241 lb 10 oz)  Body mass index is 42.8 kg/m².    Intake/Output - Last 3 Shifts         06/29 0700 06/30 0659 06/30 0700 07/01 0659 07/01 0700 07/02 0659    P.O.       I.V. (mL/kg) 1955.3 (18) 533.5 (4.9)     IV Piggyback 48.9 167.7     Total Intake(mL/kg) 2004.2 (18.5) 701.2 (6.4)     Urine (mL/kg/hr) 0 (0) 0 (0)     Emesis/NG output 500 900     Drains 20      Blood       Total Output 520 900     Net +1484.2 -198.8            Urine Occurrence 3 x 3 x              Physical Exam  Vitals and nursing note reviewed.   Constitutional:       Appearance: She is well-developed.   HENT:      Head: Normocephalic.   Eyes:      Pupils: Pupils are equal, round, and reactive to light.   Neck:      Thyroid: No thyromegaly.      Vascular:  No JVD.      Trachea: No tracheal deviation.   Cardiovascular:      Rate and Rhythm: Normal rate and regular rhythm.      Heart sounds: Normal heart sounds.   Pulmonary:      Breath sounds: Normal breath sounds. No wheezing.   Abdominal:      Palpations: Abdomen is not rigid.      Comments: Soft, mild distention; incision c/d/I without erythema or drainage; JARRED drain with serous output   Musculoskeletal:         General: Normal range of motion.      Right lower leg: No edema.      Left lower leg: No edema.   Lymphadenopathy:      Cervical: No cervical adenopathy.   Skin:     General: Skin is warm and dry.      Findings: No erythema or rash.   Neurological:      Mental Status: She is alert and oriented to person, place, and time.   Psychiatric:         Mood and Affect: Mood normal.         Behavior: Behavior normal.         Thought Content: Thought content normal.         Judgment: Judgment normal.        Significant Labs:  I have reviewed all pertinent lab results within the past 24 hours.  CBC:   Recent Labs   Lab 07/01/23  1104   WBC 10.72   RBC 4.95   HGB 13.1   HCT 41.7      MCV 84   MCH 26.5*   MCHC 31.4*     BMP:   Recent Labs   Lab 07/01/23  1104   *   *   K 4.4   CL 95   CO2 24   BUN 42*   CREATININE 1.7*   CALCIUM 8.9     CMP:   Recent Labs   Lab 07/01/23  1104   *   CALCIUM 8.9   ALBUMIN 2.2*   PROT 6.1   *   K 4.4   CO2 24   CL 95   BUN 42*   CREATININE 1.7*   ALKPHOS 79   ALT 15   AST 14   BILITOT 1.0     LFTs:   Recent Labs   Lab 07/01/23  1104   ALT 15   AST 14   ALKPHOS 79   BILITOT 1.0   PROT 6.1   ALBUMIN 2.2*       Significant Diagnostics:  I have reviewed all pertinent imaging results/findings within the past 24 hours.

## 2023-07-01 NOTE — PLAN OF CARE
Problem: Adult Inpatient Plan of Care  Goal: Plan of Care Review  Outcome: Ongoing, Progressing  Goal: Patient-Specific Goal (Individualized)  Outcome: Ongoing, Progressing  Goal: Absence of Hospital-Acquired Illness or Injury  Outcome: Ongoing, Progressing  Goal: Optimal Comfort and Wellbeing  Outcome: Ongoing, Progressing  Goal: Readiness for Transition of Care  Outcome: Ongoing, Progressing     Problem: Skin Injury Risk Increased  Goal: Skin Health and Integrity  Outcome: Ongoing, Progressing     Problem: Infection  Goal: Absence of Infection Signs and Symptoms  Outcome: Ongoing, Progressing     Problem: Bariatric Environmental Safety  Goal: Safety Maintained with Care  Outcome: Ongoing, Progressing     Problem: Fall Injury Risk  Goal: Absence of Fall and Fall-Related Injury  Outcome: Ongoing, Progressing     Problem: Nausea and Vomiting  Goal: Fluid and Electrolyte Balance  Outcome: Ongoing, Progressing

## 2023-07-01 NOTE — ASSESSMENT & PLAN NOTE
- due  aspiration pneumonia  -- Cont O2  --IV Unasyn started  --supportive therapy (mucinex, flonase, albuterol INH QID)  --enhanced covid/isolation precautions  -CTA : No PE . Patchy infiltrates are seen within the lower lobes concerning for aspiration or airspace disease or edema process aspiration

## 2023-07-01 NOTE — ASSESSMENT & PLAN NOTE
S/p Open incisional hernia repair with mesh, partial omentectomy 06/20/2023     - cont NPO  - OOB, ambulation encouraged  - IV meds  - IS 10xs/hr while awake  - PPx: SCDs, Lovenox   - Reflux medications   - await advanced diet until bowel function improves   Patient

## 2023-07-01 NOTE — PROGRESS NOTES
[facility] Medicine  Progress Note    Patient Name: Hamzah Nicolas  MRN: [id number]  Patient Class: IP- Inpatient   Admission Date: 6/28/2023  Length of Stay: 3 days  Attending Physician: Marcus Hutchinson MD  Primary Care Provider: Lali Hatch MD        Subjective:     Principal Problem:Incarcerated ventral hernia        HPI:  The patient is a 58-year-old female with a past medical history notable for allergic sinusitis, rotator cuff tendonitis with subacromial impingement, lumbar disc disease with right lumbar radiculitis who presented with a 5-day history of an increasingly tender and red umbilical bulge. Her discomfort escalated, accompanied by nausea and vomiting, leading her to abstain from eating or drinking over the past few days and eventually seek emergency care. Upon arrival at the emergency room, work up revealed an incarcerated ventral hernia and underwent an open incisional hernia repair with mesh, in addition to a partial omentectomy (6/29/2023). Post-operative care included pain management, diet advancement, use of an incentive spirometer, application of a pneumatic compression device, administration of Lovenox, and physical therapy consultation. On 6/30, she developed acute respiratory failur. In light of these developments, Hospital Medicine was consulted to evaluate.    The time of our encounter, patient, despite appearing notably uncomfortable, she was not in acute distress and only complained of reflux while being maintained on 3-4L nasal cannula.  Upon further inquiring of problem, noted that her symptoms were triggered by an episode of persistent nausea and intractable vomiting, raising concerns about aspiration. Denies any fevers, chills, chest pain, or shortness a breath at this time. Also denies any recent sick contacts or recent travel.      Overview/Hospital Course:  7/1  She is now on  10 lt by nasal canula . CTA chest : No PE . Patchy infiltrates are  seen within the lower lobes concerning for aspiration or airspace disease or edema process aspiration . Abd XR show ileus . Started on IVAB . The kidney function is trending up .      Interval History:     Review of Systems   Constitutional:  Negative for chills, fatigue and fever.   HENT:  Negative for congestion, postnasal drip, rhinorrhea and sore throat.    Eyes:  Negative for pain and visual disturbance.   Respiratory:  Positive for shortness of breath. Negative for cough, chest tightness and wheezing.    Cardiovascular:  Negative for chest pain, palpitations and leg swelling.   Gastrointestinal:  Positive for abdominal distention. Negative for abdominal pain, constipation, diarrhea, nausea and vomiting.   Genitourinary:  Negative for difficulty urinating, flank pain and hematuria.   Musculoskeletal:  Negative for arthralgias, back pain, gait problem and joint swelling.   Skin:  Negative for pallor and rash.   Neurological:  Negative for dizziness, syncope and weakness.   Psychiatric/Behavioral:  Negative for confusion, decreased concentration and suicidal ideas.    Objective:     Vital Signs (Most Recent):  Temp: 99.6 °F (37.6 °C) (07/01/23 1209)  Pulse: 104 (07/01/23 1512)  Resp: (!) 24 (07/01/23 1319)  BP: (!) 117/57 (07/01/23 1209)  SpO2: (!) 92 % (07/01/23 1319) Vital Signs (24h Range):  Temp:  [98.5 °F (36.9 °C)-99.6 °F (37.6 °C)] 99.6 °F (37.6 °C)  Pulse:  [] 104  Resp:  [16-24] 24  SpO2:  [85 %-99 %] 92 %  BP: (102-117)/(48-69) 117/57     Weight: 109.6 kg (241 lb 10 oz)  Body mass index is 42.8 kg/m².    Intake/Output Summary (Last 24 hours) at 7/1/2023 1620  Last data filed at 7/1/2023 0822  Gross per 24 hour   Intake 701.21 ml   Output 15 ml   Net 686.21 ml         Physical Exam  Vitals and nursing note reviewed.   Constitutional:       Appearance: She is well-developed. She is ill-appearing.   HENT:      Head: Normocephalic.   Eyes:      Extraocular Movements: Extraocular movements intact.       Pupils: Pupils are equal, round, and reactive to light.   Neck:      Thyroid: No thyromegaly.      Vascular: No JVD.      Trachea: No tracheal deviation.   Cardiovascular:      Rate and Rhythm: Normal rate and regular rhythm.      Heart sounds: Normal heart sounds.   Pulmonary:      Breath sounds: Rhonchi present. No wheezing.   Abdominal:      Palpations: Abdomen is not rigid.      Comments: Soft, mild distention; incision c/d/I without erythema or drainage; JARRED drain with serous output   Musculoskeletal:         General: Normal range of motion.      Right lower leg: No edema.      Left lower leg: No edema.   Lymphadenopathy:      Cervical: No cervical adenopathy.   Skin:     General: Skin is warm and dry.      Findings: No erythema or rash.   Neurological:      Mental Status: She is alert and oriented to person, place, and time.   Psychiatric:         Mood and Affect: Mood normal.         Behavior: Behavior normal.         Thought Content: Thought content normal.         Judgment: Judgment normal.           Significant Labs: All pertinent labs within the past 24 hours have been reviewed.  Recent Lab Results         07/01/23  1104   06/30/23 2032        Albumin 2.2         Alkaline Phosphatase 79         ALT 15         Anion Gap 12         AST 14         Bands 17.0         Basophil % 0.0         BILIRUBIN TOTAL 1.0  Comment: For infants and newborns, interpretation of results should be based  on gestational age, weight and in agreement with clinical  observations.    Premature Infant recommended reference ranges:  Up to 24 hours.............<8.0 mg/dL  Up to 48 hours............<12.0 mg/dL  3-5 days..................<15.0 mg/dL  6-29 days.................<15.0 mg/dL           BNP   45  Comment: Values of less than 100 pg/ml are consistent with non-CHF populations.       BUN 42         Calcium 8.9         Chloride 95         CO2 24         Creatinine 1.7         Differential Method Manual  Comment: CORRECTED RESULT;  previously reported as Automated on 07/01/2023 at   11:29.    [C]         eGFR 34         Eosinophil % 0.0         Glucose 119         Gran % 68.0         Hematocrit 41.7         Hemoglobin 13.1         Immature Grans (Abs) CANCELED  Comment: Mild elevation in immature granulocytes is non specific and   can be seen in a variety of conditions including stress response,   acute inflammation, trauma and pregnancy. Correlation with other   laboratory and clinical findings is essential.    Result canceled by the ancillary.           Immature Granulocytes CANCELED  Comment: Result canceled by the ancillary.         Lymph % 12.0         MCH 26.5         MCHC 31.4         MCV 84         Mono % 3.0         MPV 8.7         nRBC 0         Platelet Estimate Appears normal         Platelets 282         Potassium 4.4         PROTEIN TOTAL 6.1         RBC 4.95         RDW 15.4         Sodium 131         WBC 10.72                  [C] - Corrected Result               Significant Imaging: I have reviewed all pertinent imaging results/findings within the past 24 hours.      Assessment/Plan:      * Incarcerated ventral hernia  --management per primary      HARSHAD (acute kidney injury)  Multifactorial due IVVD  Vs TOM  Natacha IVF  monitor UOP  And kidney function     Aspiration pneumonia  Cont IVAB       Intractable vomiting with nausea  -NPO  -Abd xr = ileus         Acute hypoxemic respiratory failure  - due  aspiration pneumonia  -- Cont O2  --IV Unasyn started  --supportive therapy (mucinex, flonase, albuterol INH QID)  --enhanced covid/isolation precautions  -CTA : No PE . Patchy infiltrates are seen within the lower lobes concerning for aspiration or airspace disease or edema process aspiration       BMI 35.0-35.9,adult  Counseled       VTE Risk Mitigation (From admission, onward)         Ordered     IP VTE HIGH RISK PATIENT  Once         06/28/23 1414     Place sequential compression device  Until discontinued         06/28/23 1414                 Discharge Planning   ROSALVA:      Code Status: Full Code   Is the patient medically ready for discharge?:     Reason for patient still in hospital (select all that apply): Treatment  Discharge Plan A: Home with family                  Jeferson Huff MD  Department of Hospital Medicine   [facility] [facility] ([facility])

## 2023-07-02 LAB
ANION GAP SERPL CALC-SCNC: 11 MMOL/L (ref 8–16)
BASOPHILS # BLD AUTO: 0.02 K/UL (ref 0–0.2)
BASOPHILS NFR BLD: 0.2 % (ref 0–1.9)
BUN SERPL-MCNC: 27 MG/DL (ref 6–20)
CALCIUM SERPL-MCNC: 8.5 MG/DL (ref 8.7–10.5)
CHLORIDE SERPL-SCNC: 98 MMOL/L (ref 95–110)
CO2 SERPL-SCNC: 28 MMOL/L (ref 23–29)
CREAT SERPL-MCNC: 0.8 MG/DL (ref 0.5–1.4)
DIFFERENTIAL METHOD: ABNORMAL
EOSINOPHIL # BLD AUTO: 0.3 K/UL (ref 0–0.5)
EOSINOPHIL NFR BLD: 2.8 % (ref 0–8)
ERYTHROCYTE [DISTWIDTH] IN BLOOD BY AUTOMATED COUNT: 15.6 % (ref 11.5–14.5)
EST. GFR  (NO RACE VARIABLE): >60 ML/MIN/1.73 M^2
GLUCOSE SERPL-MCNC: 107 MG/DL (ref 70–110)
HCT VFR BLD AUTO: 36.8 % (ref 37–48.5)
HGB BLD-MCNC: 11.5 G/DL (ref 12–16)
IMM GRANULOCYTES # BLD AUTO: 0.05 K/UL (ref 0–0.04)
IMM GRANULOCYTES NFR BLD AUTO: 0.4 % (ref 0–0.5)
LYMPHOCYTES # BLD AUTO: 1.1 K/UL (ref 1–4.8)
LYMPHOCYTES NFR BLD: 8.9 % (ref 18–48)
MCH RBC QN AUTO: 26.6 PG (ref 27–31)
MCHC RBC AUTO-ENTMCNC: 31.3 G/DL (ref 32–36)
MCV RBC AUTO: 85 FL (ref 82–98)
MONOCYTES # BLD AUTO: 1 K/UL (ref 0.3–1)
MONOCYTES NFR BLD: 8.2 % (ref 4–15)
NEUTROPHILS # BLD AUTO: 9.6 K/UL (ref 1.8–7.7)
NEUTROPHILS NFR BLD: 79.5 % (ref 38–73)
NRBC BLD-RTO: 0 /100 WBC
PLATELET # BLD AUTO: 293 K/UL (ref 150–450)
PMV BLD AUTO: 9.1 FL (ref 9.2–12.9)
POTASSIUM SERPL-SCNC: 3.8 MMOL/L (ref 3.5–5.1)
RBC # BLD AUTO: 4.32 M/UL (ref 4–5.4)
SODIUM SERPL-SCNC: 137 MMOL/L (ref 136–145)
WBC # BLD AUTO: 12.08 K/UL (ref 3.9–12.7)

## 2023-07-02 PROCEDURE — 80048 BASIC METABOLIC PNL TOTAL CA: CPT | Performed by: COLON & RECTAL SURGERY

## 2023-07-02 PROCEDURE — 97530 THERAPEUTIC ACTIVITIES: CPT

## 2023-07-02 PROCEDURE — 99024 PR POST-OP FOLLOW-UP VISIT: ICD-10-PCS | Mod: ,,, | Performed by: COLON & RECTAL SURGERY

## 2023-07-02 PROCEDURE — C9113 INJ PANTOPRAZOLE SODIUM, VIA: HCPCS | Performed by: STUDENT IN AN ORGANIZED HEALTH CARE EDUCATION/TRAINING PROGRAM

## 2023-07-02 PROCEDURE — 25000003 PHARM REV CODE 250: Performed by: SURGERY

## 2023-07-02 PROCEDURE — 94761 N-INVAS EAR/PLS OXIMETRY MLT: CPT

## 2023-07-02 PROCEDURE — 94664 DEMO&/EVAL PT USE INHALER: CPT

## 2023-07-02 PROCEDURE — 21400001 HC TELEMETRY ROOM

## 2023-07-02 PROCEDURE — 94799 UNLISTED PULMONARY SVC/PX: CPT

## 2023-07-02 PROCEDURE — 85025 COMPLETE CBC W/AUTO DIFF WBC: CPT | Performed by: COLON & RECTAL SURGERY

## 2023-07-02 PROCEDURE — 99024 POSTOP FOLLOW-UP VISIT: CPT | Mod: ,,, | Performed by: COLON & RECTAL SURGERY

## 2023-07-02 PROCEDURE — 25000242 PHARM REV CODE 250 ALT 637 W/ HCPCS: Performed by: NURSE PRACTITIONER

## 2023-07-02 PROCEDURE — 99900035 HC TECH TIME PER 15 MIN (STAT)

## 2023-07-02 PROCEDURE — 27100171 HC OXYGEN HIGH FLOW UP TO 24 HOURS

## 2023-07-02 PROCEDURE — 63600175 PHARM REV CODE 636 W HCPCS: Performed by: SURGERY

## 2023-07-02 PROCEDURE — 36415 COLL VENOUS BLD VENIPUNCTURE: CPT | Performed by: COLON & RECTAL SURGERY

## 2023-07-02 PROCEDURE — 97162 PT EVAL MOD COMPLEX 30 MIN: CPT

## 2023-07-02 PROCEDURE — 94640 AIRWAY INHALATION TREATMENT: CPT

## 2023-07-02 PROCEDURE — 25000003 PHARM REV CODE 250: Performed by: STUDENT IN AN ORGANIZED HEALTH CARE EDUCATION/TRAINING PROGRAM

## 2023-07-02 PROCEDURE — 63600175 PHARM REV CODE 636 W HCPCS: Performed by: STUDENT IN AN ORGANIZED HEALTH CARE EDUCATION/TRAINING PROGRAM

## 2023-07-02 RX ADMIN — FLUOXETINE 40 MG: 20 CAPSULE ORAL at 08:07

## 2023-07-02 RX ADMIN — FLUTICASONE PROPIONATE 100 MCG: 50 SPRAY, METERED NASAL at 08:07

## 2023-07-02 RX ADMIN — ALPRAZOLAM 0.25 MG: 0.25 TABLET ORAL at 08:07

## 2023-07-02 RX ADMIN — IPRATROPIUM BROMIDE AND ALBUTEROL SULFATE 3 ML: 2.5; .5 SOLUTION RESPIRATORY (INHALATION) at 01:07

## 2023-07-02 RX ADMIN — AMPICILLIN SODIUM AND SULBACTAM SODIUM 3 G: 2; 1 INJECTION, POWDER, FOR SOLUTION INTRAMUSCULAR; INTRAVENOUS at 11:07

## 2023-07-02 RX ADMIN — AMPICILLIN SODIUM AND SULBACTAM SODIUM 3 G: 2; 1 INJECTION, POWDER, FOR SOLUTION INTRAMUSCULAR; INTRAVENOUS at 07:07

## 2023-07-02 RX ADMIN — IPRATROPIUM BROMIDE AND ALBUTEROL SULFATE 3 ML: 2.5; .5 SOLUTION RESPIRATORY (INHALATION) at 12:07

## 2023-07-02 RX ADMIN — GUAIFENESIN 1200 MG: 600 TABLET, EXTENDED RELEASE ORAL at 08:07

## 2023-07-02 RX ADMIN — AMPICILLIN SODIUM AND SULBACTAM SODIUM 3 G: 2; 1 INJECTION, POWDER, FOR SOLUTION INTRAMUSCULAR; INTRAVENOUS at 12:07

## 2023-07-02 RX ADMIN — PANTOPRAZOLE SODIUM 40 MG: 40 INJECTION, POWDER, FOR SOLUTION INTRAVENOUS at 08:07

## 2023-07-02 RX ADMIN — IPRATROPIUM BROMIDE AND ALBUTEROL SULFATE 3 ML: 2.5; .5 SOLUTION RESPIRATORY (INHALATION) at 07:07

## 2023-07-02 RX ADMIN — AMPICILLIN SODIUM AND SULBACTAM SODIUM 3 G: 2; 1 INJECTION, POWDER, FOR SOLUTION INTRAMUSCULAR; INTRAVENOUS at 06:07

## 2023-07-02 RX ADMIN — ALPRAZOLAM 0.25 MG: 0.25 TABLET ORAL at 04:07

## 2023-07-02 RX ADMIN — CHLORHEXIDINE GLUCONATE 0.12% ORAL RINSE 10 ML: 1.2 LIQUID ORAL at 08:07

## 2023-07-02 RX ADMIN — HYDROMORPHONE HYDROCHLORIDE 1 MG: 1 INJECTION, SOLUTION INTRAMUSCULAR; INTRAVENOUS; SUBCUTANEOUS at 10:07

## 2023-07-02 RX ADMIN — PANTOPRAZOLE SODIUM 40 MG: 40 INJECTION, POWDER, FOR SOLUTION INTRAVENOUS at 10:07

## 2023-07-02 RX ADMIN — MONTELUKAST 10 MG: 10 TABLET, FILM COATED ORAL at 08:07

## 2023-07-02 NOTE — SUBJECTIVE & OBJECTIVE
Interval History:     Review of Systems   Constitutional:  Negative for chills, fatigue and fever.   HENT:  Negative for congestion, postnasal drip, rhinorrhea and sore throat.    Eyes:  Negative for pain and visual disturbance.   Respiratory:  Positive for shortness of breath. Negative for cough, chest tightness and wheezing.    Cardiovascular:  Negative for chest pain, palpitations and leg swelling.   Gastrointestinal:  Positive for abdominal distention. Negative for abdominal pain, constipation, diarrhea, nausea and vomiting.   Genitourinary:  Negative for difficulty urinating, flank pain and hematuria.   Musculoskeletal:  Negative for arthralgias, back pain, gait problem and joint swelling.   Skin:  Negative for pallor and rash.   Neurological:  Negative for dizziness, syncope and weakness.   Psychiatric/Behavioral:  Negative for confusion, decreased concentration and suicidal ideas.    Objective:     Vital Signs (Most Recent):  Temp: 97.9 °F (36.6 °C) (07/02/23 0746)  Pulse: 110 (07/02/23 0751)  Resp: 18 (07/02/23 1019)  BP: (!) 128/56 (07/02/23 0746)  SpO2: (!) 93 % (07/02/23 0751) Vital Signs (24h Range):  Temp:  [97.9 °F (36.6 °C)-99.6 °F (37.6 °C)] 97.9 °F (36.6 °C)  Pulse:  [] 110  Resp:  [18-24] 18  SpO2:  [92 %-96 %] 93 %  BP: ()/(53-57) 128/56     Weight: 110.6 kg (243 lb 13.3 oz)  Body mass index is 43.19 kg/m².    Intake/Output Summary (Last 24 hours) at 7/2/2023 1124  Last data filed at 7/2/2023 0710  Gross per 24 hour   Intake --   Output 5 ml   Net -5 ml         Physical Exam  Vitals and nursing note reviewed.   Constitutional:       Appearance: She is well-developed. She is ill-appearing.   HENT:      Head: Normocephalic.   Eyes:      Extraocular Movements: Extraocular movements intact.      Pupils: Pupils are equal, round, and reactive to light.   Neck:      Thyroid: No thyromegaly.      Vascular: No JVD.      Trachea: No tracheal deviation.   Cardiovascular:      Rate and Rhythm:  Normal rate and regular rhythm.      Heart sounds: Normal heart sounds.   Pulmonary:      Breath sounds: Rhonchi present. No wheezing.   Abdominal:      Palpations: Abdomen is not rigid.      Comments: Soft, mild distention; incision c/d/I without erythema or drainage; JARRED drain with serous output   Musculoskeletal:         General: Normal range of motion.      Right lower leg: No edema.      Left lower leg: No edema.   Lymphadenopathy:      Cervical: No cervical adenopathy.   Skin:     General: Skin is warm and dry.      Findings: No erythema or rash.   Neurological:      Mental Status: She is alert and oriented to person, place, and time.   Psychiatric:         Mood and Affect: Mood normal.         Behavior: Behavior normal.         Thought Content: Thought content normal.         Judgment: Judgment normal.           Significant Labs: All pertinent labs within the past 24 hours have been reviewed.  Recent Lab Results         07/02/23  1030        Anion Gap 11       Baso # 0.02       Basophil % 0.2       BUN 27       Calcium 8.5       Chloride 98       CO2 28       Creatinine 0.8       Differential Method Automated       eGFR >60       Eos # 0.3       Eosinophil % 2.8       Glucose 107       Gran # (ANC) 9.6       Gran % 79.5       Hematocrit 36.8       Hemoglobin 11.5       Immature Grans (Abs) 0.05  Comment: Mild elevation in immature granulocytes is non specific and   can be seen in a variety of conditions including stress response,   acute inflammation, trauma and pregnancy. Correlation with other   laboratory and clinical findings is essential.         Immature Granulocytes 0.4       Lymph # 1.1       Lymph % 8.9       MCH 26.6       MCHC 31.3       MCV 85       Mono # 1.0       Mono % 8.2       MPV 9.1       nRBC 0       Platelets 293       Potassium 3.8       RBC 4.32       RDW 15.6       Sodium 137       WBC 12.08               Significant Imaging: I have reviewed all pertinent imaging results/findings  within the past 24 hours.

## 2023-07-02 NOTE — ASSESSMENT & PLAN NOTE
S/p Open incisional hernia repair with mesh, partial omentectomy 06/20/2023     - cont NPO until bowel function returns  - OOB, ambulation encouraged. Stressed the importance of getting out of bed and ambulating for postoperative recovery  - IV meds, will convert back to PO once bowel function resumes  - IS 10xs/hr while awake  - PPx: SCDs, Lovenox   - Reflux medications   - await advanced diet until bowel function improves

## 2023-07-02 NOTE — SUBJECTIVE & OBJECTIVE
Interval History: No further nausea or vomiting. No BM/flatus. Poor ambulation.     Medications:  Continuous Infusions:   dextrose 5 % and 0.45 % NaCl 75 mL/hr at 07/01/23 1930     Scheduled Meds:   albuterol-ipratropium  3 mL Nebulization Q6H    ALPRAZolam  0.25 mg Oral TID    ampicillin-sulbactim (UNASYN) IVPB  3 g Intravenous Q6H    chlorhexidine  10 mL Mouth/Throat BID    FLUoxetine  40 mg Oral Daily    fluticasone propionate  2 spray Each Nostril Daily    guaiFENesin  1,200 mg Oral BID    montelukast  10 mg Oral QHS    pantoprazole  40 mg Intravenous BID     PRN Meds:aluminum-magnesium hydroxide-simethicone, diphenhydrAMINE, HYDROmorphone, HYDROmorphone, metoclopramide HCl, ondansetron     Review of patient's allergies indicates:   Allergen Reactions    Opioids - morphine analogues      Objective:     Vital Signs (Most Recent):  Temp: 97.9 °F (36.6 °C) (07/02/23 0746)  Pulse: 110 (07/02/23 0751)  Resp: (!) 22 (07/02/23 0751)  BP: (!) 128/56 (07/02/23 0746)  SpO2: (!) 93 % (07/02/23 0751) Vital Signs (24h Range):  Temp:  [97.9 °F (36.6 °C)-99.6 °F (37.6 °C)] 97.9 °F (36.6 °C)  Pulse:  [] 110  Resp:  [18-24] 22  SpO2:  [92 %-96 %] 93 %  BP: ()/(53-57) 128/56     Weight: 110.6 kg (243 lb 13.3 oz)  Body mass index is 43.19 kg/m².    Intake/Output - Last 3 Shifts         06/30 0700 07/01 0659 07/01 0700 07/02 0659 07/02 0700 07/03 0659    I.V. (mL/kg) 533.5 (4.9)      IV Piggyback 167.7      Total Intake(mL/kg) 701.2 (6.4)      Urine (mL/kg/hr) 0 (0)  0 (0)    Emesis/NG output 900      Drains  15 5    Total Output 900 15 5    Net -198.8 -15 -5           Urine Occurrence 3 x 2 x 1 x             Physical Exam  Vitals and nursing note reviewed.   Constitutional:       Appearance: She is well-developed.   HENT:      Head: Normocephalic.   Eyes:      Pupils: Pupils are equal, round, and reactive to light.   Neck:      Thyroid: No thyromegaly.      Vascular: No JVD.      Trachea: No tracheal deviation.    Cardiovascular:      Rate and Rhythm: Tachycardia present.      Heart sounds: Normal heart sounds.   Pulmonary:      Breath sounds: Normal breath sounds. No wheezing.   Abdominal:      Palpations: Abdomen is not rigid.      Comments: Soft, mild distention; mild tympany; incision c/d/I without erythema or drainage; JARRED drain with serous output   Musculoskeletal:         General: Normal range of motion.      Right lower leg: No edema.      Left lower leg: No edema.   Lymphadenopathy:      Cervical: No cervical adenopathy.   Skin:     General: Skin is warm and dry.      Findings: No erythema or rash.   Neurological:      Mental Status: She is alert and oriented to person, place, and time.   Psychiatric:         Mood and Affect: Mood normal.         Behavior: Behavior normal.         Thought Content: Thought content normal.         Judgment: Judgment normal.        Significant Labs:  I have reviewed all pertinent lab results within the past 24 hours.  CBC:   Recent Labs   Lab 07/01/23  1104   WBC 10.72   RBC 4.95   HGB 13.1   HCT 41.7      MCV 84   MCH 26.5*   MCHC 31.4*     BMP:   Recent Labs   Lab 07/01/23  1104   *   *   K 4.4   CL 95   CO2 24   BUN 42*   CREATININE 1.7*   CALCIUM 8.9     CMP:   Recent Labs   Lab 07/01/23  1104   *   CALCIUM 8.9   ALBUMIN 2.2*   PROT 6.1   *   K 4.4   CO2 24   CL 95   BUN 42*   CREATININE 1.7*   ALKPHOS 79   ALT 15   AST 14   BILITOT 1.0     LFTs:   Recent Labs   Lab 07/01/23  1104   ALT 15   AST 14   ALKPHOS 79   BILITOT 1.0   PROT 6.1   ALBUMIN 2.2*       Significant Diagnostics:  I have reviewed all pertinent imaging results/findings within the past 24 hours.

## 2023-07-02 NOTE — PT/OT/SLP EVAL
"Physical Therapy Evaluation    Patient Name:  Hamzah Nicolas   MRN:  [id number]    Recommendations:     Discharge Recommendations: nursing facility, skilled, home health PT (DEPENDING ON PROGRESS)   Discharge Equipment Recommendations: to be determined by next level of care   Barriers to discharge: Decreased caregiver support    Assessment:     Hamzah Nicolas is a 58 y.o. female admitted with a medical diagnosis of Incarcerated ventral hernia.  She presents with the following impairments/functional limitations: weakness, impaired endurance, impaired cardiopulmonary response to activity, impaired balance, pain, gait instability, impaired functional mobility, impaired self care skills, decreased upper extremity function, decreased lower extremity function, decreased safety awareness, decreased ROM .    Rehab Prognosis: Good; patient would benefit from acute skilled PT services to address these deficits and reach maximum level of function.    Recent Surgery: Procedure(s) (LRB):  REPAIR, HERNIA, UMBILICAL, INCARCERATED, AGE 4 YEARS OR OLDER (N/A)  OMENTECTOMY (N/A) 4 Days Post-Op    Plan:     During this hospitalization, patient to be seen 3 x/week to address the identified rehab impairments via gait training, therapeutic activities, therapeutic exercises and progress toward the following goals:    Plan of Care Expires:  07/16/23    Subjective     Chief Complaint: NONE STATED HOWEVER APPEARED IN PAIN AND APPEARED IRRITABLE  Patient/Family Comments/goals: NONE STATED   Pain/Comfort:  Pain Rating 1: 0/10  Location 1:  (PT APPEARED TO BE IN PAIN HOWEVER WHEN ASKED REPORTED, " I DONT CARE. NO ONE SEEMS TO CARE. AND I DONT WANT TO TALK ABOUT IT.")  Pain Rating Post-Intervention 1: 0/10    Patients cultural, spiritual, Latter day conflicts given the current situation:      Living Environment:   PT LIVES AT HOME WITH MOTHER IN AN APT WITH 16 STEPS  WITH 1 RAILING   Prior to admission, patients level of function was " "IND/DOESN'T DRIVE OR WORK.  Equipment used at home: none.  DME owned (not currently used): none.  Upon discharge, patient will have assistance from UNKNOWN .    Objective:     Communicated with NURSE PATEL AND [facility] CHART REVIEW  prior to session.  Patient found supine with peripheral IV, oxygen, telemetry, pulse ox (continuous)  upon PT entry to room.    General Precautions: Standard, fall  Orthopedic Precautions:N/A   Braces: N/A  Respiratory Status: Nasal cannula, flow 10 L/min    Exams:  Postural Exam:  Patient presented with the following abnormalities:    -       Rounded shoulders  -       Forward head  RLE ROM: WFL  RLE Strength: WFL  LLE ROM: WFL  LLE Strength: WFL    Functional Mobility:  Bed Mobility:     Rolling Right: minimum assistance  Supine to Sit: minimum assistance  Transfers:     Sit to Stand:  minimum assistance with rolling walker  Bed to Chair: contact guard assistance with  rolling walker  using  Stand Pivot      AM-PAC 6 CLICK MOBILITY  Total Score:14       Treatment & Education:  PT SEATED IN CHAIR FOR REST. PT DECLINE GT TRAINING. PT EDUCATED ON ROLE OF P.T. AND REC FOR OOB TOLERANCE TO DEC WEAKNESS, RISK OF PNEUMONIA, AND BLOOD CLOTS.   PT EDUCATED ON RISK FOR FALLS DUE TO GENERALIZED WEAKNESS, EDUCATED ON "CALL DON'T FALL", ENCOURAGED TO CALL FOR ASSISTANCE WITH ALL NEEDS SUCH AS BED<>CHAIR TRANSFERS OR TRIPS TO BATHROOM.  PT EDUCATED ON B LE TE X AP, TKE, AND MIP.     Patient left up in chair with all lines intact, call button in reach, and chair alarm on.    GOALS:   Multidisciplinary Problems       Physical Therapy Goals          Problem: Physical Therapy    Goal Priority Disciplines Outcome Goal Variances Interventions   Physical Therapy Goal     PT, PT/OT      Description: LT23  1. PT WILL COMPLETE BED MOBILITY IND FOR OOB TOLERANCE  2. PT WILL T/F TO CHAIR WITH LRAD WITH SBA  3. PT WILL GT TRAIN X 150' WITH LRAD TO PROGRESS MOBILITY                        History:     Past " Medical History:   Diagnosis Date    Anxiety     Back pain     Depression        Past Surgical History:   Procedure Laterality Date    CHOLECYSTECTOMY      HYSTERECTOMY      OMENTECTOMY N/A 6/28/2023    Procedure: OMENTECTOMY;  Surgeon: Marcus Hutchinson MD;  Location: [facility] OR;  Service: General;  Laterality: N/A;    REPAIR OF INCARCERATED UMBILICAL HERNIA N/A 6/28/2023    Procedure: REPAIR, HERNIA, UMBILICAL, INCARCERATED, AGE 5 YEARS OR OLDER;  Surgeon: Marcus Hutchinson MD;  Location: [facility] OR;  Service: General;  Laterality: N/A;       Time Tracking:     PT Received On: 07/02/23  PT Start Time: 0645     PT Stop Time: 0710  PT Total Time (min): 25 min     Billable Minutes: Evaluation 15 and Therapeutic Activity 10      07/02/2023

## 2023-07-02 NOTE — PROGRESS NOTES
[facility] ([facility])  General Surgery  Progress Note    Subjective:     Interval History: No further nausea or vomiting. No BM/flatus. Poor ambulation.     Medications:  Continuous Infusions:   dextrose 5 % and 0.45 % NaCl 75 mL/hr at 07/01/23 1930     Scheduled Meds:   albuterol-ipratropium  3 mL Nebulization Q6H    ALPRAZolam  0.25 mg Oral TID    ampicillin-sulbactim (UNASYN) IVPB  3 g Intravenous Q6H    chlorhexidine  10 mL Mouth/Throat BID    FLUoxetine  40 mg Oral Daily    fluticasone propionate  2 spray Each Nostril Daily    guaiFENesin  1,200 mg Oral BID    montelukast  10 mg Oral QHS    pantoprazole  40 mg Intravenous BID     PRN Meds:aluminum-magnesium hydroxide-simethicone, diphenhydrAMINE, HYDROmorphone, HYDROmorphone, metoclopramide HCl, ondansetron     Review of patient's allergies indicates:   Allergen Reactions    Opioids - morphine analogues      Objective:     Vital Signs (Most Recent):  Temp: 97.9 °F (36.6 °C) (07/02/23 0746)  Pulse: 110 (07/02/23 0751)  Resp: (!) 22 (07/02/23 0751)  BP: (!) 128/56 (07/02/23 0746)  SpO2: (!) 93 % (07/02/23 0751) Vital Signs (24h Range):  Temp:  [97.9 °F (36.6 °C)-99.6 °F (37.6 °C)] 97.9 °F (36.6 °C)  Pulse:  [] 110  Resp:  [18-24] 22  SpO2:  [92 %-96 %] 93 %  BP: ()/(53-57) 128/56     Weight: 110.6 kg (243 lb 13.3 oz)  Body mass index is 43.19 kg/m².    Intake/Output - Last 3 Shifts         06/30 0700 07/01 0659 07/01 0700 07/02 0659 07/02 0700 07/03 0659    I.V. (mL/kg) 533.5 (4.9)      IV Piggyback 167.7      Total Intake(mL/kg) 701.2 (6.4)      Urine (mL/kg/hr) 0 (0)  0 (0)    Emesis/NG output 900      Drains  15 5    Total Output 900 15 5    Net -198.8 -15 -5           Urine Occurrence 3 x 2 x 1 x             Physical Exam  Vitals and nursing note reviewed.   Constitutional:       Appearance: She is well-developed.   HENT:      Head: Normocephalic.   Eyes:      Pupils: Pupils are equal, round, and reactive to light.   Neck:       Thyroid: No thyromegaly.      Vascular: No JVD.      Trachea: No tracheal deviation.   Cardiovascular:      Rate and Rhythm: Tachycardia present.      Heart sounds: Normal heart sounds.   Pulmonary:      Breath sounds: Normal breath sounds. No wheezing.   Abdominal:      Palpations: Abdomen is not rigid.      Comments: Soft, mild distention; mild tympany; incision c/d/I without erythema or drainage; JARRED drain with serous output   Musculoskeletal:         General: Normal range of motion.      Right lower leg: No edema.      Left lower leg: No edema.   Lymphadenopathy:      Cervical: No cervical adenopathy.   Skin:     General: Skin is warm and dry.      Findings: No erythema or rash.   Neurological:      Mental Status: She is alert and oriented to person, place, and time.   Psychiatric:         Mood and Affect: Mood normal.         Behavior: Behavior normal.         Thought Content: Thought content normal.         Judgment: Judgment normal.        Significant Labs:  I have reviewed all pertinent lab results within the past 24 hours.  CBC:   Recent Labs   Lab 07/01/23  1104   WBC 10.72   RBC 4.95   HGB 13.1   HCT 41.7      MCV 84   MCH 26.5*   MCHC 31.4*     BMP:   Recent Labs   Lab 07/01/23  1104   *   *   K 4.4   CL 95   CO2 24   BUN 42*   CREATININE 1.7*   CALCIUM 8.9     CMP:   Recent Labs   Lab 07/01/23  1104   *   CALCIUM 8.9   ALBUMIN 2.2*   PROT 6.1   *   K 4.4   CO2 24   CL 95   BUN 42*   CREATININE 1.7*   ALKPHOS 79   ALT 15   AST 14   BILITOT 1.0     LFTs:   Recent Labs   Lab 07/01/23  1104   ALT 15   AST 14   ALKPHOS 79   BILITOT 1.0   PROT 6.1   ALBUMIN 2.2*       Significant Diagnostics:  I have reviewed all pertinent imaging results/findings within the past 24 hours.    Assessment/Plan:     * Incarcerated ventral hernia  S/p Open incisional hernia repair with mesh, partial omentectomy 06/20/2023     - cont NPO until bowel function returns  - OOB, ambulation encouraged.  Stressed the importance of getting out of bed and ambulating for postoperative recovery  - IV meds, will convert back to PO once bowel function resumes  - IS 10xs/hr while awake  - PPx: SCDs, Lovenox   - Reflux medications   - await advanced diet until bowel function improves    HARSHAD (acute kidney injury)  Treatment per hospital medicine     Aspiration pneumonia  Treatment per hospital medicine     Acute hypoxemic respiratory failure  Treatment per hospital medicine     BMI 35.0-35.9,adult  Encouraged weight loss        Carmelo Rodriguez MD  General Surgery  [facility] Tele[facility] ([facility])

## 2023-07-02 NOTE — PROGRESS NOTES
[facility] Medicine  Progress Note    Patient Name: Hamzah Nicolas  MRN: [id number]  Patient Class: IP- Inpatient   Admission Date: 6/28/2023  Length of Stay: 4 days  Attending Physician: Marcus Hutchinson MD  Primary Care Provider: Lali Hatch MD        Subjective:     Principal Problem:Incarcerated ventral hernia        HPI:  The patient is a 58-year-old female with a past medical history notable for allergic sinusitis, rotator cuff tendonitis with subacromial impingement, lumbar disc disease with right lumbar radiculitis who presented with a 5-day history of an increasingly tender and red umbilical bulge. Her discomfort escalated, accompanied by nausea and vomiting, leading her to abstain from eating or drinking over the past few days and eventually seek emergency care. Upon arrival at the emergency room, work up revealed an incarcerated ventral hernia and underwent an open incisional hernia repair with mesh, in addition to a partial omentectomy (6/29/2023). Post-operative care included pain management, diet advancement, use of an incentive spirometer, application of a pneumatic compression device, administration of Lovenox, and physical therapy consultation. On 6/30, she developed acute respiratory failur. In light of these developments, Hospital Medicine was consulted to evaluate.    The time of our encounter, patient, despite appearing notably uncomfortable, she was not in acute distress and only complained of reflux while being maintained on 3-4L nasal cannula.  Upon further inquiring of problem, noted that her symptoms were triggered by an episode of persistent nausea and intractable vomiting, raising concerns about aspiration. Denies any fevers, chills, chest pain, or shortness a breath at this time. Also denies any recent sick contacts or recent travel.      Overview/Hospital Course:  7/1  She is now on  10 lt by nasal canula . CTA chest : No PE . Patchy infiltrates are  seen within the lower lobes concerning for aspiration or airspace disease or edema process aspiration . Abd XR show ileus . Started on IVAB . The kidney function is trending up .  7/2 Kidney function back to normal . She cont on 10 lt by nasal canula . NAEON .       Interval History:     Review of Systems   Constitutional:  Negative for chills, fatigue and fever.   HENT:  Negative for congestion, postnasal drip, rhinorrhea and sore throat.    Eyes:  Negative for pain and visual disturbance.   Respiratory:  Positive for shortness of breath. Negative for cough, chest tightness and wheezing.    Cardiovascular:  Negative for chest pain, palpitations and leg swelling.   Gastrointestinal:  Positive for abdominal distention. Negative for abdominal pain, constipation, diarrhea, nausea and vomiting.   Genitourinary:  Negative for difficulty urinating, flank pain and hematuria.   Musculoskeletal:  Negative for arthralgias, back pain, gait problem and joint swelling.   Skin:  Negative for pallor and rash.   Neurological:  Negative for dizziness, syncope and weakness.   Psychiatric/Behavioral:  Negative for confusion, decreased concentration and suicidal ideas.    Objective:     Vital Signs (Most Recent):  Temp: 97.9 °F (36.6 °C) (07/02/23 0746)  Pulse: 110 (07/02/23 0751)  Resp: 18 (07/02/23 1019)  BP: (!) 128/56 (07/02/23 0746)  SpO2: (!) 93 % (07/02/23 0751) Vital Signs (24h Range):  Temp:  [97.9 °F (36.6 °C)-99.6 °F (37.6 °C)] 97.9 °F (36.6 °C)  Pulse:  [] 110  Resp:  [18-24] 18  SpO2:  [92 %-96 %] 93 %  BP: ()/(53-57) 128/56     Weight: 110.6 kg (243 lb 13.3 oz)  Body mass index is 43.19 kg/m².    Intake/Output Summary (Last 24 hours) at 7/2/2023 1124  Last data filed at 7/2/2023 0710  Gross per 24 hour   Intake --   Output 5 ml   Net -5 ml         Physical Exam  Vitals and nursing note reviewed.   Constitutional:       Appearance: She is well-developed. She is ill-appearing.   HENT:      Head: Normocephalic.    Eyes:      Extraocular Movements: Extraocular movements intact.      Pupils: Pupils are equal, round, and reactive to light.   Neck:      Thyroid: No thyromegaly.      Vascular: No JVD.      Trachea: No tracheal deviation.   Cardiovascular:      Rate and Rhythm: Normal rate and regular rhythm.      Heart sounds: Normal heart sounds.   Pulmonary:      Breath sounds: Rhonchi present. No wheezing.   Abdominal:      Palpations: Abdomen is not rigid.      Comments: Soft, mild distention; incision c/d/I without erythema or drainage; JARRED drain with serous output   Musculoskeletal:         General: Normal range of motion.      Right lower leg: No edema.      Left lower leg: No edema.   Lymphadenopathy:      Cervical: No cervical adenopathy.   Skin:     General: Skin is warm and dry.      Findings: No erythema or rash.   Neurological:      Mental Status: She is alert and oriented to person, place, and time.   Psychiatric:         Mood and Affect: Mood normal.         Behavior: Behavior normal.         Thought Content: Thought content normal.         Judgment: Judgment normal.           Significant Labs: All pertinent labs within the past 24 hours have been reviewed.  Recent Lab Results         07/02/23  1030        Anion Gap 11       Baso # 0.02       Basophil % 0.2       BUN 27       Calcium 8.5       Chloride 98       CO2 28       Creatinine 0.8       Differential Method Automated       eGFR >60       Eos # 0.3       Eosinophil % 2.8       Glucose 107       Gran # (ANC) 9.6       Gran % 79.5       Hematocrit 36.8       Hemoglobin 11.5       Immature Grans (Abs) 0.05  Comment: Mild elevation in immature granulocytes is non specific and   can be seen in a variety of conditions including stress response,   acute inflammation, trauma and pregnancy. Correlation with other   laboratory and clinical findings is essential.         Immature Granulocytes 0.4       Lymph # 1.1       Lymph % 8.9       MCH 26.6       MCHC 31.3        MCV 85       Mono # 1.0       Mono % 8.2       MPV 9.1       nRBC 0       Platelets 293       Potassium 3.8       RBC 4.32       RDW 15.6       Sodium 137       WBC 12.08               Significant Imaging: I have reviewed all pertinent imaging results/findings within the past 24 hours.      Assessment/Plan:      * Incarcerated ventral hernia  --management per primary      HARSHAD (acute kidney injury)  Multifactorial due IVVD  Vs TOM  Natacha IVF  monitor UOP  And kidney function     Improving     Aspiration pneumonia  Cont IVAB       Intractable vomiting with nausea  -NPO  -Abd xr = ileus         Acute hypoxemic respiratory failure  - due  aspiration pneumonia  -- Cont O2  --IV Unasyn started  --supportive therapy (mucinex, flonase, albuterol INH QID)  --enhanced covid/isolation precautions  -CTA : No PE . Patchy infiltrates are seen within the lower lobes concerning for aspiration or airspace disease or edema process aspiration       BMI 35.0-35.9,adult  Counseled       VTE Risk Mitigation (From admission, onward)         Ordered     IP VTE HIGH RISK PATIENT  Once         06/28/23 1414     Place sequential compression device  Until discontinued         06/28/23 1414                Discharge Planning   ROSALVA:      Code Status: Full Code   Is the patient medically ready for discharge?:     Reason for patient still in hospital (select all that apply): Treatment  Discharge Plan A: Home with family                  Jeferson Reed Huff MD  Department of Hospital Medicine   [facility] Tele[facility] ([facility])

## 2023-07-03 LAB
ANION GAP SERPL CALC-SCNC: 10 MMOL/L (ref 8–16)
ANION GAP SERPL CALC-SCNC: 10 MMOL/L (ref 8–16)
BASOPHILS # BLD AUTO: 0.08 K/UL (ref 0–0.2)
BASOPHILS # BLD AUTO: 0.08 K/UL (ref 0–0.2)
BASOPHILS NFR BLD: 0.7 % (ref 0–1.9)
BASOPHILS NFR BLD: 0.7 % (ref 0–1.9)
BUN SERPL-MCNC: 12 MG/DL (ref 6–20)
BUN SERPL-MCNC: 12 MG/DL (ref 6–20)
CALCIUM SERPL-MCNC: 8.4 MG/DL (ref 8.7–10.5)
CALCIUM SERPL-MCNC: 8.4 MG/DL (ref 8.7–10.5)
CHLORIDE SERPL-SCNC: 100 MMOL/L (ref 95–110)
CHLORIDE SERPL-SCNC: 100 MMOL/L (ref 95–110)
CO2 SERPL-SCNC: 28 MMOL/L (ref 23–29)
CO2 SERPL-SCNC: 28 MMOL/L (ref 23–29)
CREAT SERPL-MCNC: 0.7 MG/DL (ref 0.5–1.4)
CREAT SERPL-MCNC: 0.7 MG/DL (ref 0.5–1.4)
DIFFERENTIAL METHOD: ABNORMAL
DIFFERENTIAL METHOD: ABNORMAL
EOSINOPHIL # BLD AUTO: 0.4 K/UL (ref 0–0.5)
EOSINOPHIL # BLD AUTO: 0.4 K/UL (ref 0–0.5)
EOSINOPHIL NFR BLD: 3.2 % (ref 0–8)
EOSINOPHIL NFR BLD: 3.2 % (ref 0–8)
ERYTHROCYTE [DISTWIDTH] IN BLOOD BY AUTOMATED COUNT: 15.6 % (ref 11.5–14.5)
ERYTHROCYTE [DISTWIDTH] IN BLOOD BY AUTOMATED COUNT: 15.6 % (ref 11.5–14.5)
EST. GFR  (NO RACE VARIABLE): >60 ML/MIN/1.73 M^2
EST. GFR  (NO RACE VARIABLE): >60 ML/MIN/1.73 M^2
FINAL PATHOLOGIC DIAGNOSIS: NORMAL
GIANT PLATELETS BLD QL SMEAR: PRESENT
GIANT PLATELETS BLD QL SMEAR: PRESENT
GLUCOSE SERPL-MCNC: 83 MG/DL (ref 70–110)
GLUCOSE SERPL-MCNC: 83 MG/DL (ref 70–110)
GROSS: NORMAL
HCT VFR BLD AUTO: 36.2 % (ref 37–48.5)
HCT VFR BLD AUTO: 36.2 % (ref 37–48.5)
HGB BLD-MCNC: 11 G/DL (ref 12–16)
HGB BLD-MCNC: 11 G/DL (ref 12–16)
IMM GRANULOCYTES # BLD AUTO: 0.1 K/UL (ref 0–0.04)
IMM GRANULOCYTES # BLD AUTO: 0.1 K/UL (ref 0–0.04)
IMM GRANULOCYTES NFR BLD AUTO: 0.9 % (ref 0–0.5)
IMM GRANULOCYTES NFR BLD AUTO: 0.9 % (ref 0–0.5)
LYMPHOCYTES # BLD AUTO: 1.3 K/UL (ref 1–4.8)
LYMPHOCYTES # BLD AUTO: 1.3 K/UL (ref 1–4.8)
LYMPHOCYTES NFR BLD: 11.7 % (ref 18–48)
LYMPHOCYTES NFR BLD: 11.7 % (ref 18–48)
Lab: NORMAL
MCH RBC QN AUTO: 26.3 PG (ref 27–31)
MCH RBC QN AUTO: 26.3 PG (ref 27–31)
MCHC RBC AUTO-ENTMCNC: 30.4 G/DL (ref 32–36)
MCHC RBC AUTO-ENTMCNC: 30.4 G/DL (ref 32–36)
MCV RBC AUTO: 86 FL (ref 82–98)
MCV RBC AUTO: 86 FL (ref 82–98)
MONOCYTES # BLD AUTO: 1 K/UL (ref 0.3–1)
MONOCYTES # BLD AUTO: 1 K/UL (ref 0.3–1)
MONOCYTES NFR BLD: 9.2 % (ref 4–15)
MONOCYTES NFR BLD: 9.2 % (ref 4–15)
NEUTROPHILS # BLD AUTO: 8.2 K/UL (ref 1.8–7.7)
NEUTROPHILS # BLD AUTO: 8.2 K/UL (ref 1.8–7.7)
NEUTROPHILS NFR BLD: 74.3 % (ref 38–73)
NEUTROPHILS NFR BLD: 74.3 % (ref 38–73)
NRBC BLD-RTO: 0 /100 WBC
NRBC BLD-RTO: 0 /100 WBC
PLATELET # BLD AUTO: 288 K/UL (ref 150–450)
PLATELET # BLD AUTO: 288 K/UL (ref 150–450)
PMV BLD AUTO: 8.9 FL (ref 9.2–12.9)
PMV BLD AUTO: 8.9 FL (ref 9.2–12.9)
POLYCHROMASIA BLD QL SMEAR: ABNORMAL
POLYCHROMASIA BLD QL SMEAR: ABNORMAL
POTASSIUM SERPL-SCNC: 3.8 MMOL/L (ref 3.5–5.1)
POTASSIUM SERPL-SCNC: 3.8 MMOL/L (ref 3.5–5.1)
RBC # BLD AUTO: 4.19 M/UL (ref 4–5.4)
RBC # BLD AUTO: 4.19 M/UL (ref 4–5.4)
SODIUM SERPL-SCNC: 138 MMOL/L (ref 136–145)
SODIUM SERPL-SCNC: 138 MMOL/L (ref 136–145)
WBC # BLD AUTO: 10.99 K/UL (ref 3.9–12.7)
WBC # BLD AUTO: 10.99 K/UL (ref 3.9–12.7)

## 2023-07-03 PROCEDURE — 25000003 PHARM REV CODE 250: Performed by: STUDENT IN AN ORGANIZED HEALTH CARE EDUCATION/TRAINING PROGRAM

## 2023-07-03 PROCEDURE — 80048 BASIC METABOLIC PNL TOTAL CA: CPT | Performed by: INTERNAL MEDICINE

## 2023-07-03 PROCEDURE — 21400001 HC TELEMETRY ROOM

## 2023-07-03 PROCEDURE — 27000646 HC AEROBIKA DEVICE

## 2023-07-03 PROCEDURE — 36415 COLL VENOUS BLD VENIPUNCTURE: CPT | Performed by: INTERNAL MEDICINE

## 2023-07-03 PROCEDURE — 27100171 HC OXYGEN HIGH FLOW UP TO 24 HOURS

## 2023-07-03 PROCEDURE — 94640 AIRWAY INHALATION TREATMENT: CPT

## 2023-07-03 PROCEDURE — 25000242 PHARM REV CODE 250 ALT 637 W/ HCPCS: Performed by: NURSE PRACTITIONER

## 2023-07-03 PROCEDURE — C9113 INJ PANTOPRAZOLE SODIUM, VIA: HCPCS | Performed by: STUDENT IN AN ORGANIZED HEALTH CARE EDUCATION/TRAINING PROGRAM

## 2023-07-03 PROCEDURE — 25000003 PHARM REV CODE 250: Performed by: INTERNAL MEDICINE

## 2023-07-03 PROCEDURE — 63600175 PHARM REV CODE 636 W HCPCS: Performed by: STUDENT IN AN ORGANIZED HEALTH CARE EDUCATION/TRAINING PROGRAM

## 2023-07-03 PROCEDURE — 99900035 HC TECH TIME PER 15 MIN (STAT)

## 2023-07-03 PROCEDURE — 85025 COMPLETE CBC W/AUTO DIFF WBC: CPT | Performed by: INTERNAL MEDICINE

## 2023-07-03 PROCEDURE — 25000003 PHARM REV CODE 250: Performed by: SURGERY

## 2023-07-03 PROCEDURE — 94761 N-INVAS EAR/PLS OXIMETRY MLT: CPT

## 2023-07-03 PROCEDURE — 63600175 PHARM REV CODE 636 W HCPCS: Performed by: SURGERY

## 2023-07-03 PROCEDURE — 94664 DEMO&/EVAL PT USE INHALER: CPT

## 2023-07-03 PROCEDURE — 94799 UNLISTED PULMONARY SVC/PX: CPT

## 2023-07-03 PROCEDURE — 94618 PULMONARY STRESS TESTING: CPT

## 2023-07-03 RX ORDER — MELOXICAM 7.5 MG/1
15 TABLET ORAL DAILY
Status: DISCONTINUED | OUTPATIENT
Start: 2023-07-03 | End: 2023-07-07 | Stop reason: HOSPADM

## 2023-07-03 RX ORDER — AMOXICILLIN AND CLAVULANATE POTASSIUM 875; 125 MG/1; MG/1
1 TABLET, FILM COATED ORAL EVERY 12 HOURS
Status: DISCONTINUED | OUTPATIENT
Start: 2023-07-03 | End: 2023-07-07 | Stop reason: HOSPADM

## 2023-07-03 RX ORDER — GABAPENTIN 300 MG/1
300 CAPSULE ORAL 3 TIMES DAILY
Status: DISCONTINUED | OUTPATIENT
Start: 2023-07-03 | End: 2023-07-07 | Stop reason: HOSPADM

## 2023-07-03 RX ORDER — BISACODYL 5 MG
10 TABLET, DELAYED RELEASE (ENTERIC COATED) ORAL ONCE
Status: COMPLETED | OUTPATIENT
Start: 2023-07-03 | End: 2023-07-03

## 2023-07-03 RX ADMIN — IPRATROPIUM BROMIDE AND ALBUTEROL SULFATE 3 ML: 2.5; .5 SOLUTION RESPIRATORY (INHALATION) at 07:07

## 2023-07-03 RX ADMIN — GABAPENTIN 300 MG: 300 CAPSULE ORAL at 04:07

## 2023-07-03 RX ADMIN — MELOXICAM 15 MG: 7.5 TABLET ORAL at 09:07

## 2023-07-03 RX ADMIN — IPRATROPIUM BROMIDE AND ALBUTEROL SULFATE 3 ML: 2.5; .5 SOLUTION RESPIRATORY (INHALATION) at 12:07

## 2023-07-03 RX ADMIN — PANTOPRAZOLE SODIUM 40 MG: 40 INJECTION, POWDER, FOR SOLUTION INTRAVENOUS at 08:07

## 2023-07-03 RX ADMIN — MONTELUKAST 10 MG: 10 TABLET, FILM COATED ORAL at 08:07

## 2023-07-03 RX ADMIN — AMOXICILLIN AND CLAVULANATE POTASSIUM 1 TABLET: 875; 125 TABLET, FILM COATED ORAL at 11:07

## 2023-07-03 RX ADMIN — PANTOPRAZOLE SODIUM 40 MG: 40 INJECTION, POWDER, FOR SOLUTION INTRAVENOUS at 09:07

## 2023-07-03 RX ADMIN — GUAIFENESIN 1200 MG: 600 TABLET, EXTENDED RELEASE ORAL at 09:07

## 2023-07-03 RX ADMIN — FLUOXETINE 40 MG: 20 CAPSULE ORAL at 09:07

## 2023-07-03 RX ADMIN — FLUTICASONE PROPIONATE 100 MCG: 50 SPRAY, METERED NASAL at 09:07

## 2023-07-03 RX ADMIN — IPRATROPIUM BROMIDE AND ALBUTEROL SULFATE 3 ML: 2.5; .5 SOLUTION RESPIRATORY (INHALATION) at 02:07

## 2023-07-03 RX ADMIN — AMOXICILLIN AND CLAVULANATE POTASSIUM 1 TABLET: 875; 125 TABLET, FILM COATED ORAL at 08:07

## 2023-07-03 RX ADMIN — ALPRAZOLAM 0.25 MG: 0.25 TABLET ORAL at 09:07

## 2023-07-03 RX ADMIN — GABAPENTIN 300 MG: 300 CAPSULE ORAL at 09:07

## 2023-07-03 RX ADMIN — AMPICILLIN SODIUM AND SULBACTAM SODIUM 3 G: 2; 1 INJECTION, POWDER, FOR SOLUTION INTRAMUSCULAR; INTRAVENOUS at 05:07

## 2023-07-03 RX ADMIN — CHLORHEXIDINE GLUCONATE 0.12% ORAL RINSE 10 ML: 1.2 LIQUID ORAL at 09:07

## 2023-07-03 RX ADMIN — GABAPENTIN 300 MG: 300 CAPSULE ORAL at 08:07

## 2023-07-03 RX ADMIN — BISACODYL 10 MG: 5 TABLET, COATED ORAL at 09:07

## 2023-07-03 RX ADMIN — GUAIFENESIN 1200 MG: 600 TABLET, EXTENDED RELEASE ORAL at 08:07

## 2023-07-03 RX ADMIN — ALPRAZOLAM 0.25 MG: 0.25 TABLET ORAL at 04:07

## 2023-07-03 NOTE — ASSESSMENT & PLAN NOTE
- due  aspiration pneumonia  -- Cont O2  --s/p IV Unasyn  . Cont po Augmentin   --supportive therapy (mucinex, flonase, albuterol INH QID)  --enhanced covid/isolation precautions  -CTA : No PE . Patchy infiltrates are seen within the lower lobes concerning for aspiration or airspace disease or edema process aspiration

## 2023-07-03 NOTE — SUBJECTIVE & OBJECTIVE
Interval History: bowel movement, anxiety, clear liquids, dulcolax    Medications:  Continuous Infusions:   dextrose 5 % and 0.45 % NaCl 75 mL/hr at 07/01/23 1930     Scheduled Meds:   albuterol-ipratropium  3 mL Nebulization Q6H    ALPRAZolam  0.25 mg Oral TID    ampicillin-sulbactim (UNASYN) IVPB  3 g Intravenous Q6H    chlorhexidine  10 mL Mouth/Throat BID    FLUoxetine  40 mg Oral Daily    fluticasone propionate  2 spray Each Nostril Daily    guaiFENesin  1,200 mg Oral BID    montelukast  10 mg Oral QHS    pantoprazole  40 mg Intravenous BID     PRN Meds:aluminum-magnesium hydroxide-simethicone, diphenhydrAMINE, HYDROmorphone, HYDROmorphone, metoclopramide HCl, ondansetron     Review of patient's allergies indicates:   Allergen Reactions    Opioids - morphine analogues      Objective:     Vital Signs (Most Recent):  Temp: 98 °F (36.7 °C) (07/03/23 0716)  Pulse: 97 (07/03/23 0716)  Resp: 18 (07/03/23 0716)  BP: (!) 161/134 (07/03/23 0716)  SpO2: (!) 94 % (07/03/23 0716) Vital Signs (24h Range):  Temp:  [97.9 °F (36.6 °C)-98.3 °F (36.8 °C)] 98 °F (36.7 °C)  Pulse:  [] 97  Resp:  [18-24] 18  SpO2:  [92 %-97 %] 94 %  BP: ()/() 161/134     Weight: 110.6 kg (243 lb 13.3 oz)  Body mass index is 43.19 kg/m².    Intake/Output - Last 3 Shifts         07/01 0700 07/02 0659 07/02 0700 07/03 0659 07/03 0700 07/04 0659    I.V. (mL/kg)       IV Piggyback       Total Intake(mL/kg)       Urine (mL/kg/hr)  0 (0)     Emesis/NG output       Drains 15 15     Total Output 15 15     Net -15 -15            Urine Occurrence 2 x 1 x              Physical Exam  Vitals and nursing note reviewed.   Constitutional:       Appearance: She is well-developed. She is obese.   HENT:      Head: Normocephalic.   Eyes:      Pupils: Pupils are equal, round, and reactive to light.   Neck:      Thyroid: No thyromegaly.      Vascular: No JVD.      Trachea: No tracheal deviation.   Cardiovascular:      Rate and Rhythm: Normal rate and  regular rhythm.      Heart sounds: Normal heart sounds.   Pulmonary:      Breath sounds: Normal breath sounds. No wheezing.      Comments: Course at bases  Abdominal:      General: Bowel sounds are normal. There is no distension.      Palpations: Abdomen is soft. Abdomen is not rigid. There is no mass.      Tenderness: There is no abdominal tenderness. There is no guarding or rebound.      Comments: Obese    Incision is clean  Drain is serosanginous   Musculoskeletal:         General: Normal range of motion.      Right lower leg: No edema.      Left lower leg: No edema.   Lymphadenopathy:      Cervical: No cervical adenopathy.   Skin:     General: Skin is warm and dry.      Findings: No erythema or rash.   Neurological:      Mental Status: She is oriented to person, place, and time.        Significant Labs:  I have reviewed all pertinent lab results within the past 24 hours.  CBC:   Recent Labs   Lab 07/03/23  0553   WBC 10.99  10.99   RBC 4.19  4.19   HGB 11.0*  11.0*   HCT 36.2*  36.2*     288   MCV 86  86   MCH 26.3*  26.3*   MCHC 30.4*  30.4*     BMP:   Recent Labs   Lab 07/03/23  0553   GLU 83  83     138   K 3.8  3.8     100   CO2 28  28   BUN 12  12   CREATININE 0.7  0.7   CALCIUM 8.4*  8.4*       Significant Diagnostics:  I have reviewed all pertinent imaging results/findings within the past 24 hours.  No new

## 2023-07-03 NOTE — SUBJECTIVE & OBJECTIVE
Interval History:     Review of Systems  Objective:     Vital Signs (Most Recent):  Temp: 98 °F (36.7 °C) (07/03/23 0716)  Pulse: 97 (07/03/23 0716)  Resp: 18 (07/03/23 0716)  BP: (!) 161/134 (07/03/23 0716)  SpO2: (!) 94 % (07/03/23 0716) Vital Signs (24h Range):  Temp:  [97.9 °F (36.6 °C)-98.3 °F (36.8 °C)] 98 °F (36.7 °C)  Pulse:  [] 97  Resp:  [18-24] 18  SpO2:  [92 %-97 %] 94 %  BP: ()/() 161/134     Weight: 110.6 kg (243 lb 13.3 oz)  Body mass index is 43.19 kg/m².    Intake/Output Summary (Last 24 hours) at 7/3/2023 1206  Last data filed at 7/3/2023 0920  Gross per 24 hour   Intake 220 ml   Output 12 ml   Net 208 ml         Physical Exam  Vitals and nursing note reviewed.   Constitutional:       Appearance: She is well-developed. She is ill-appearing.   HENT:      Head: Normocephalic.   Eyes:      Extraocular Movements: Extraocular movements intact.      Pupils: Pupils are equal, round, and reactive to light.   Neck:      Thyroid: No thyromegaly.      Vascular: No JVD.      Trachea: No tracheal deviation.   Cardiovascular:      Rate and Rhythm: Normal rate and regular rhythm.      Heart sounds: Normal heart sounds.   Pulmonary:      Breath sounds: Rhonchi present. No wheezing.   Abdominal:      Palpations: Abdomen is not rigid.      Comments: Soft, mild distention; incision c/d/I without erythema or drainage; JARRED drain with serous output   Musculoskeletal:         General: Normal range of motion.      Right lower leg: No edema.      Left lower leg: No edema.   Lymphadenopathy:      Cervical: No cervical adenopathy.   Skin:     General: Skin is warm and dry.      Findings: No erythema or rash.   Neurological:      Mental Status: She is alert and oriented to person, place, and time.   Psychiatric:         Mood and Affect: Mood normal.         Behavior: Behavior normal.         Thought Content: Thought content normal.         Judgment: Judgment normal.           Significant Labs: All pertinent  labs within the past 24 hours have been reviewed.  Recent Lab Results         07/03/23  0553        Anion Gap 10        10       Baso # 0.08        0.08       Basophil % 0.7        0.7       BUN 12        12       Calcium 8.4        8.4       Chloride 100        100       CO2 28        28       Creatinine 0.7        0.7       Differential Method Automated        Automated       eGFR >60        >60       Eos # 0.4        0.4       Eosinophil % 3.2        3.2       Giant Platelets Present        Present       Glucose 83        83       Gran # (ANC) 8.2        8.2       Gran % 74.3        74.3       Hematocrit 36.2        36.2       Hemoglobin 11.0        11.0       Immature Grans (Abs) 0.10  Comment: Mild elevation in immature granulocytes is non specific and   can be seen in a variety of conditions including stress response,   acute inflammation, trauma and pregnancy. Correlation with other   laboratory and clinical findings is essential.          0.10  Comment: Mild elevation in immature granulocytes is non specific and   can be seen in a variety of conditions including stress response,   acute inflammation, trauma and pregnancy. Correlation with other   laboratory and clinical findings is essential.         Immature Granulocytes 0.9        0.9       Lymph # 1.3        1.3       Lymph % 11.7        11.7       MCH 26.3        26.3       MCHC 30.4        30.4       MCV 86        86       Mono # 1.0        1.0       Mono % 9.2        9.2       MPV 8.9        8.9       nRBC 0        0       Platelets 288        288       Poly Occasional        Occasional       Potassium 3.8        3.8       RBC 4.19        4.19       RDW 15.6        15.6       Sodium 138        138       WBC 10.99        10.99               Significant Imaging: I have reviewed all pertinent imaging results/findings within the past 24 hours.

## 2023-07-03 NOTE — PT/OT/SLP PROGRESS
"Physical Therapy      Patient Name:  Hamzah Nicolas   MRN:  [id number]    PT presented to pt's room at 1411. Pt refused all OOB activity, functional mobility, and in bed TherEx despite encouragement and education of benefits. Pt reports multiple "accidents" when she tries to get up. Pt was educated on the importance of functional mobility for her recovery, pt continued to refuse.    Macey Willard, PT, DPT  7/3/2023   1411    "

## 2023-07-03 NOTE — ASSESSMENT & PLAN NOTE
S/p Open incisional hernia repair with mesh, partial omentectomy 06/20/2023     - clear liquids  - OOB, ambulation encouraged. Stressed the importance of getting out of bed and ambulating for postoperative recovery  -oral medications- IS 10xs/hr while awake  - PPx: SCDs, Lovenox   - Reflux medications

## 2023-07-03 NOTE — PROGRESS NOTES
[facility]  General Surgery  Progress Note    Subjective:     History of Present Illness:  58-year-old female with a 5 day history of bulging at the umbilicus.  The area has become red and tender.  The pain increased and she presented to the emergency room.  She had associated nausea and vomiting She has not had anything to eat and drink in a few days.  She denies any fever or chills.  She is not on any anticoagulation medicines.      Patient was evaluated in the emergency room and a urgent surgical consult was obtained.      Post-Op Info:  Procedure(s) (LRB):  REPAIR, HERNIA, UMBILICAL, INCARCERATED, AGE 5 YEARS OR OLDER (N/A)  OMENTECTOMY (N/A)   5 Days Post-Op     Interval History: bowel movement, anxiety, clear liquids, dulcolax    Medications:  Continuous Infusions:   dextrose 5 % and 0.45 % NaCl 75 mL/hr at 07/01/23 1930     Scheduled Meds:   albuterol-ipratropium  3 mL Nebulization Q6H    ALPRAZolam  0.25 mg Oral TID    ampicillin-sulbactim (UNASYN) IVPB  3 g Intravenous Q6H    chlorhexidine  10 mL Mouth/Throat BID    FLUoxetine  40 mg Oral Daily    fluticasone propionate  2 spray Each Nostril Daily    guaiFENesin  1,200 mg Oral BID    montelukast  10 mg Oral QHS    pantoprazole  40 mg Intravenous BID     PRN Meds:aluminum-magnesium hydroxide-simethicone, diphenhydrAMINE, HYDROmorphone, HYDROmorphone, metoclopramide HCl, ondansetron     Review of patient's allergies indicates:   Allergen Reactions    Opioids - morphine analogues      Objective:     Vital Signs (Most Recent):  Temp: 98 °F (36.7 °C) (07/03/23 0716)  Pulse: 97 (07/03/23 0716)  Resp: 18 (07/03/23 0716)  BP: (!) 161/134 (07/03/23 0716)  SpO2: (!) 94 % (07/03/23 0716) Vital Signs (24h Range):  Temp:  [97.9 °F (36.6 °C)-98.3 °F (36.8 °C)] 98 °F (36.7 °C)  Pulse:  [] 97  Resp:  [18-24] 18  SpO2:  [92 %-97 %] 94 %  BP: ()/() 161/134     Weight: 110.6 kg (243 lb 13.3 oz)  Body mass index is 43.19  kg/m².    Intake/Output - Last 3 Shifts         07/01 0700 07/02 0659 07/02 0700 07/03 0659 07/03 0700 07/04 0659    I.V. (mL/kg)       IV Piggyback       Total Intake(mL/kg)       Urine (mL/kg/hr)  0 (0)     Emesis/NG output       Drains 15 15     Total Output 15 15     Net -15 -15            Urine Occurrence 2 x 1 x              Physical Exam  Vitals and nursing note reviewed.   Constitutional:       Appearance: She is well-developed. She is obese.   HENT:      Head: Normocephalic.   Eyes:      Pupils: Pupils are equal, round, and reactive to light.   Neck:      Thyroid: No thyromegaly.      Vascular: No JVD.      Trachea: No tracheal deviation.   Cardiovascular:      Rate and Rhythm: Normal rate and regular rhythm.      Heart sounds: Normal heart sounds.   Pulmonary:      Breath sounds: Normal breath sounds. No wheezing.      Comments: Course at bases  Abdominal:      General: Bowel sounds are normal. There is no distension.      Palpations: Abdomen is soft. Abdomen is not rigid. There is no mass.      Tenderness: There is no abdominal tenderness. There is no guarding or rebound.      Comments: Obese    Incision is clean  Drain is serosanginous   Musculoskeletal:         General: Normal range of motion.      Right lower leg: No edema.      Left lower leg: No edema.   Lymphadenopathy:      Cervical: No cervical adenopathy.   Skin:     General: Skin is warm and dry.      Findings: No erythema or rash.   Neurological:      Mental Status: She is oriented to person, place, and time.        Significant Labs:  I have reviewed all pertinent lab results within the past 24 hours.  CBC:   Recent Labs   Lab 07/03/23  0553   WBC 10.99  10.99   RBC 4.19  4.19   HGB 11.0*  11.0*   HCT 36.2*  36.2*     288   MCV 86  86   MCH 26.3*  26.3*   MCHC 30.4*  30.4*     BMP:   Recent Labs   Lab 07/03/23  0553   GLU 83  83     138   K 3.8  3.8     100   CO2 28  28   BUN 12  12   CREATININE 0.7  0.7    CALCIUM 8.4*  8.4*       Significant Diagnostics:  I have reviewed all pertinent imaging results/findings within the past 24 hours.  No new    Assessment/Plan:     * Incarcerated ventral hernia  S/p Open incisional hernia repair with mesh, partial omentectomy 06/20/2023     - clear liquids  - OOB, ambulation encouraged. Stressed the importance of getting out of bed and ambulating for postoperative recovery  -oral medications- IS 10xs/hr while awake  - PPx: SCDs, Lovenox   - Reflux medications       HARSHAD (acute kidney injury)  Treatment per hospital medicine , resolved    Aspiration pneumonia  Treatment per hospital medicine     Oral abx per medicine    Acute hypoxemic respiratory failure  Treatment per hospital medicine   Home O2    BMI 35.0-35.9,adult  Encouraged weight loss        Marcus Hutchinson MD  General Surgery  [facility] - Telemetry ([facility])

## 2023-07-03 NOTE — ASSESSMENT & PLAN NOTE
Multifactorial due IVVD  Vs TOM  Natacha IVF  monitor UOP  And kidney function     Improving /resolved

## 2023-07-03 NOTE — RESPIRATORY THERAPY
Home Oxygen Evaluation - [facility] - Cardiopulmonary Department      Date Performed: 7/3/2023      1) Patient's Home O2 Sat on room air, while at rest: Room Air SpO2 At Rest: (!) 81 %        If O2 sats on room air at rest are 88% or below, patient qualifies.  Document O2 liter flow needed in Step 2.  If O2 sats are 89% or above, complete Step 3.        2)  If patient is not ambulated and O2 sats are <88%, what is the O2 liter flow required to meet ordered saturation? Home O2 Eval Comments: pt qualifies for home O2    If O2 sats on room air while exercising remain 89% or above patient does not qualify, no further testing needed Document N/A in step 3. If O2 sats on room air while exercising are 88% or below, continue to Step 4.    3) Patient's O2 Sat on room air while exercisin) Patient's O2 Sat while exercising on O2: SpO2 During Ambulation on O2: 94 % at Ambulation O2 LPM: 3 LPM         (Must show improvement from #4 for patients to qualify)

## 2023-07-03 NOTE — PLAN OF CARE
Problem: Adult Inpatient Plan of Care  Goal: Plan of Care Review  Outcome: Ongoing, Progressing  Goal: Patient-Specific Goal (Individualized)  Outcome: Ongoing, Progressing  Goal: Absence of Hospital-Acquired Illness or Injury  Outcome: Ongoing, Progressing  Goal: Optimal Comfort and Wellbeing  Outcome: Ongoing, Progressing  Goal: Readiness for Transition of Care  Outcome: Ongoing, Progressing     Problem: Skin Injury Risk Increased  Goal: Skin Health and Integrity  Outcome: Ongoing, Progressing     Problem: Infection  Goal: Absence of Infection Signs and Symptoms  Outcome: Ongoing, Progressing     Problem: Bariatric Environmental Safety  Goal: Safety Maintained with Care  Outcome: Ongoing, Progressing     Problem: Fall Injury Risk  Goal: Absence of Fall and Fall-Related Injury  Outcome: Ongoing, Progressing     Problem: Nausea and Vomiting  Goal: Fluid and Electrolyte Balance  Outcome: Ongoing, Progressing     Problem: Fluid and Electrolyte Imbalance (Acute Kidney Injury/Impairment)  Goal: Fluid and Electrolyte Balance  Outcome: Ongoing, Progressing     Problem: Oral Intake Inadequate (Acute Kidney Injury/Impairment)  Goal: Optimal Nutrition Intake  Outcome: Ongoing, Progressing     Problem: Renal Function Impairment (Acute Kidney Injury/Impairment)  Goal: Effective Renal Function  Outcome: Ongoing, Progressing     Problem: Activity Intolerance (Pulmonary Impairment)  Goal: Improved Activity Tolerance  Outcome: Ongoing, Progressing     Problem: Airway Clearance Ineffective (Pulmonary Impairment)  Goal: Effective Airway Clearance  Outcome: Ongoing, Progressing     Problem: Gas Exchange Impaired (Pulmonary Impairment)  Goal: Optimal Gas Exchange  Outcome: Ongoing, Progressing

## 2023-07-03 NOTE — PLAN OF CARE
[facility] - Telemetry (Hospital)  Discharge Reassessment    Primary Care Provider: Lali Hatch MD    Expected Discharge Date:     Reassessment (most recent)       Discharge Reassessment - 07/03/23 1326          Discharge Reassessment    Assessment Type Discharge Planning Reassessment     Did the patient's condition or plan change since previous assessment? No     Communicated ROSALVA with patient/caregiver Date not available/Unable to determine     Discharge Plan A Home with family     Why the patient remains in the hospital Requires continued medical care                   Anticipated DC Dispo: home  Progress towards plan: abx transitioned to oral,   wean O2 needs (O2 @ 5 liters NC), advance diet, PT/OT recs SNF vs HH pending progress, home O2 eval ordered.

## 2023-07-03 NOTE — PROGRESS NOTES
[facility] Medicine  Progress Note    Patient Name: Hamzah Nicolas  MRN: [id number]  Patient Class: IP- Inpatient   Admission Date: 6/28/2023  Length of Stay: 5 days  Attending Physician: Marcus Hutchinson MD  Primary Care Provider: Lali Hatch MD        Subjective:     Principal Problem:Incarcerated ventral hernia        HPI:  The patient is a 58-year-old female with a past medical history notable for allergic sinusitis, rotator cuff tendonitis with subacromial impingement, lumbar disc disease with right lumbar radiculitis who presented with a 5-day history of an increasingly tender and red umbilical bulge. Her discomfort escalated, accompanied by nausea and vomiting, leading her to abstain from eating or drinking over the past few days and eventually seek emergency care. Upon arrival at the emergency room, work up revealed an incarcerated ventral hernia and underwent an open incisional hernia repair with mesh, in addition to a partial omentectomy (6/29/2023). Post-operative care included pain management, diet advancement, use of an incentive spirometer, application of a pneumatic compression device, administration of Lovenox, and physical therapy consultation. On 6/30, she developed acute respiratory failur. In light of these developments, Hospital Medicine was consulted to evaluate.    The time of our encounter, patient, despite appearing notably uncomfortable, she was not in acute distress and only complained of reflux while being maintained on 3-4L nasal cannula.  Upon further inquiring of problem, noted that her symptoms were triggered by an episode of persistent nausea and intractable vomiting, raising concerns about aspiration. Denies any fevers, chills, chest pain, or shortness a breath at this time. Also denies any recent sick contacts or recent travel.      Overview/Hospital Course:  7/1  She is now on  10 lt by nasal canula . CTA chest : No PE . Patchy infiltrates are  seen within the lower lobes concerning for aspiration or airspace disease or edema process aspiration . Abd XR show ileus . Started on IVAB . The kidney function is trending up .  7/2 Kidney function back to normal . She cont on 10 lt by nasal canula . NAEON .   7/3 She Had a BM  over the last 24 hrs .   She is on 5 lt by NC . Diet advance per general surgery .  The IVAB changed to po .       Interval History:     Review of Systems  Objective:     Vital Signs (Most Recent):  Temp: 98 °F (36.7 °C) (07/03/23 0716)  Pulse: 97 (07/03/23 0716)  Resp: 18 (07/03/23 0716)  BP: (!) 161/134 (07/03/23 0716)  SpO2: (!) 94 % (07/03/23 0716) Vital Signs (24h Range):  Temp:  [97.9 °F (36.6 °C)-98.3 °F (36.8 °C)] 98 °F (36.7 °C)  Pulse:  [] 97  Resp:  [18-24] 18  SpO2:  [92 %-97 %] 94 %  BP: ()/() 161/134     Weight: 110.6 kg (243 lb 13.3 oz)  Body mass index is 43.19 kg/m².    Intake/Output Summary (Last 24 hours) at 7/3/2023 1206  Last data filed at 7/3/2023 0920  Gross per 24 hour   Intake 220 ml   Output 12 ml   Net 208 ml         Physical Exam  Vitals and nursing note reviewed.   Constitutional:       Appearance: She is well-developed. She is ill-appearing.   HENT:      Head: Normocephalic.   Eyes:      Extraocular Movements: Extraocular movements intact.      Pupils: Pupils are equal, round, and reactive to light.   Neck:      Thyroid: No thyromegaly.      Vascular: No JVD.      Trachea: No tracheal deviation.   Cardiovascular:      Rate and Rhythm: Normal rate and regular rhythm.      Heart sounds: Normal heart sounds.   Pulmonary:      Breath sounds: Rhonchi present. No wheezing.   Abdominal:      Palpations: Abdomen is not rigid.      Comments: Soft, mild distention; incision c/d/I without erythema or drainage; JARRED drain with serous output   Musculoskeletal:         General: Normal range of motion.      Right lower leg: No edema.      Left lower leg: No edema.   Lymphadenopathy:      Cervical: No cervical  adenopathy.   Skin:     General: Skin is warm and dry.      Findings: No erythema or rash.   Neurological:      Mental Status: She is alert and oriented to person, place, and time.   Psychiatric:         Mood and Affect: Mood normal.         Behavior: Behavior normal.         Thought Content: Thought content normal.         Judgment: Judgment normal.           Significant Labs: All pertinent labs within the past 24 hours have been reviewed.  Recent Lab Results         07/03/23  0553        Anion Gap 10        10       Baso # 0.08        0.08       Basophil % 0.7        0.7       BUN 12        12       Calcium 8.4        8.4       Chloride 100        100       CO2 28        28       Creatinine 0.7        0.7       Differential Method Automated        Automated       eGFR >60        >60       Eos # 0.4        0.4       Eosinophil % 3.2        3.2       Giant Platelets Present        Present       Glucose 83        83       Gran # (ANC) 8.2        8.2       Gran % 74.3        74.3       Hematocrit 36.2        36.2       Hemoglobin 11.0        11.0       Immature Grans (Abs) 0.10  Comment: Mild elevation in immature granulocytes is non specific and   can be seen in a variety of conditions including stress response,   acute inflammation, trauma and pregnancy. Correlation with other   laboratory and clinical findings is essential.          0.10  Comment: Mild elevation in immature granulocytes is non specific and   can be seen in a variety of conditions including stress response,   acute inflammation, trauma and pregnancy. Correlation with other   laboratory and clinical findings is essential.         Immature Granulocytes 0.9        0.9       Lymph # 1.3        1.3       Lymph % 11.7        11.7       MCH 26.3        26.3       MCHC 30.4        30.4       MCV 86        86       Mono # 1.0        1.0       Mono % 9.2        9.2       MPV 8.9        8.9       nRBC 0        0       Platelets 288        288       Poly  Occasional        Occasional       Potassium 3.8        3.8       RBC 4.19        4.19       RDW 15.6        15.6       Sodium 138        138       WBC 10.99        10.99               Significant Imaging: I have reviewed all pertinent imaging results/findings within the past 24 hours.      Assessment/Plan:      * Incarcerated ventral hernia  --management per primary      HARSHAD (acute kidney injury)  Multifactorial due IVVD  Vs TOM  Natacha IVF  monitor UOP  And kidney function     Improving /resolved    Aspiration pneumonia  S/P  IVAB  Unasyn n  Cont po Augmentin       Intractable vomiting with nausea  -NPO  -Abd xr = ileus         Acute hypoxemic respiratory failure  - due  aspiration pneumonia  -- Cont O2  --s/p IV Unasyn  . Cont po Augmentin   --supportive therapy (mucinex, flonase, albuterol INH QID)  --enhanced covid/isolation precautions  -CTA : No PE . Patchy infiltrates are seen within the lower lobes concerning for aspiration or airspace disease or edema process aspiration       BMI 35.0-35.9,adult  Counseled       VTE Risk Mitigation (From admission, onward)         Ordered     IP VTE HIGH RISK PATIENT  Once         06/28/23 1414     Place sequential compression device  Until discontinued         06/28/23 1414                Discharge Planning   ROSALVA:      Code Status: Full Code   Is the patient medically ready for discharge?:     Reason for patient still in hospital (select all that apply): Treatment  Discharge Plan A: Home with family                  Jeferson Huff MD  Department of Hospital Medicine   [facility] - Telemetry ([facility])

## 2023-07-04 PROCEDURE — 25000242 PHARM REV CODE 250 ALT 637 W/ HCPCS: Performed by: NURSE PRACTITIONER

## 2023-07-04 PROCEDURE — 94799 UNLISTED PULMONARY SVC/PX: CPT

## 2023-07-04 PROCEDURE — 27000221 HC OXYGEN, UP TO 24 HOURS

## 2023-07-04 PROCEDURE — 25000003 PHARM REV CODE 250: Performed by: INTERNAL MEDICINE

## 2023-07-04 PROCEDURE — 97110 THERAPEUTIC EXERCISES: CPT

## 2023-07-04 PROCEDURE — 99900035 HC TECH TIME PER 15 MIN (STAT)

## 2023-07-04 PROCEDURE — 63600175 PHARM REV CODE 636 W HCPCS: Performed by: STUDENT IN AN ORGANIZED HEALTH CARE EDUCATION/TRAINING PROGRAM

## 2023-07-04 PROCEDURE — 94640 AIRWAY INHALATION TREATMENT: CPT

## 2023-07-04 PROCEDURE — C9113 INJ PANTOPRAZOLE SODIUM, VIA: HCPCS | Performed by: STUDENT IN AN ORGANIZED HEALTH CARE EDUCATION/TRAINING PROGRAM

## 2023-07-04 PROCEDURE — 97116 GAIT TRAINING THERAPY: CPT

## 2023-07-04 PROCEDURE — 94761 N-INVAS EAR/PLS OXIMETRY MLT: CPT

## 2023-07-04 PROCEDURE — 94664 DEMO&/EVAL PT USE INHALER: CPT

## 2023-07-04 PROCEDURE — 21400001 HC TELEMETRY ROOM

## 2023-07-04 PROCEDURE — 97530 THERAPEUTIC ACTIVITIES: CPT | Mod: CQ

## 2023-07-04 PROCEDURE — 25000003 PHARM REV CODE 250: Performed by: STUDENT IN AN ORGANIZED HEALTH CARE EDUCATION/TRAINING PROGRAM

## 2023-07-04 PROCEDURE — 25000003 PHARM REV CODE 250: Performed by: SURGERY

## 2023-07-04 PROCEDURE — 63600175 PHARM REV CODE 636 W HCPCS: Performed by: SURGERY

## 2023-07-04 PROCEDURE — 27000646 HC AEROBIKA DEVICE

## 2023-07-04 RX ORDER — MAG HYDROX/ALUMINUM HYD/SIMETH 200-200-20
30 SUSPENSION, ORAL (FINAL DOSE FORM) ORAL
Status: DISCONTINUED | OUTPATIENT
Start: 2023-07-04 | End: 2023-07-06

## 2023-07-04 RX ORDER — SUCRALFATE 1 G/10ML
1 SUSPENSION ORAL EVERY 6 HOURS
Status: DISCONTINUED | OUTPATIENT
Start: 2023-07-04 | End: 2023-07-07 | Stop reason: HOSPADM

## 2023-07-04 RX ORDER — AMOXICILLIN AND CLAVULANATE POTASSIUM 875; 125 MG/1; MG/1
1 TABLET, FILM COATED ORAL EVERY 12 HOURS
Qty: 20 TABLET | Refills: 0 | Status: SHIPPED | OUTPATIENT
Start: 2023-07-04 | End: 2023-07-14

## 2023-07-04 RX ORDER — FAMOTIDINE 20 MG/1
20 TABLET, FILM COATED ORAL 2 TIMES DAILY
Status: DISCONTINUED | OUTPATIENT
Start: 2023-07-04 | End: 2023-07-07 | Stop reason: HOSPADM

## 2023-07-04 RX ORDER — OXYCODONE AND ACETAMINOPHEN 7.5; 325 MG/1; MG/1
1 TABLET ORAL EVERY 4 HOURS PRN
Qty: 20 TABLET | Refills: 0 | Status: SHIPPED | OUTPATIENT
Start: 2023-07-04 | End: 2023-07-31

## 2023-07-04 RX ADMIN — HYDROMORPHONE HYDROCHLORIDE 1 MG: 1 INJECTION, SOLUTION INTRAMUSCULAR; INTRAVENOUS; SUBCUTANEOUS at 03:07

## 2023-07-04 RX ADMIN — GABAPENTIN 300 MG: 300 CAPSULE ORAL at 08:07

## 2023-07-04 RX ADMIN — SUCRALFATE 1 G: 1 SUSPENSION ORAL at 06:07

## 2023-07-04 RX ADMIN — ALPRAZOLAM 0.25 MG: 0.25 TABLET ORAL at 08:07

## 2023-07-04 RX ADMIN — MONTELUKAST 10 MG: 10 TABLET, FILM COATED ORAL at 08:07

## 2023-07-04 RX ADMIN — GABAPENTIN 300 MG: 300 CAPSULE ORAL at 04:07

## 2023-07-04 RX ADMIN — ALUMINUM HYDROXIDE, MAGNESIUM HYDROXIDE, AND DIMETHICONE 30 ML: 200; 20; 200 SUSPENSION ORAL at 04:07

## 2023-07-04 RX ADMIN — FLUOXETINE 40 MG: 20 CAPSULE ORAL at 08:07

## 2023-07-04 RX ADMIN — FLUTICASONE PROPIONATE 100 MCG: 50 SPRAY, METERED NASAL at 08:07

## 2023-07-04 RX ADMIN — IPRATROPIUM BROMIDE AND ALBUTEROL SULFATE 3 ML: 2.5; .5 SOLUTION RESPIRATORY (INHALATION) at 06:07

## 2023-07-04 RX ADMIN — GUAIFENESIN 1200 MG: 600 TABLET, EXTENDED RELEASE ORAL at 08:07

## 2023-07-04 RX ADMIN — IPRATROPIUM BROMIDE AND ALBUTEROL SULFATE 3 ML: 2.5; .5 SOLUTION RESPIRATORY (INHALATION) at 07:07

## 2023-07-04 RX ADMIN — IPRATROPIUM BROMIDE AND ALBUTEROL SULFATE 3 ML: 2.5; .5 SOLUTION RESPIRATORY (INHALATION) at 12:07

## 2023-07-04 RX ADMIN — SUCRALFATE 1 G: 1 SUSPENSION ORAL at 11:07

## 2023-07-04 RX ADMIN — FAMOTIDINE 20 MG: 20 TABLET ORAL at 08:07

## 2023-07-04 RX ADMIN — FAMOTIDINE 20 MG: 20 TABLET ORAL at 10:07

## 2023-07-04 RX ADMIN — MELOXICAM 15 MG: 7.5 TABLET ORAL at 08:07

## 2023-07-04 RX ADMIN — IPRATROPIUM BROMIDE AND ALBUTEROL SULFATE 3 ML: 2.5; .5 SOLUTION RESPIRATORY (INHALATION) at 01:07

## 2023-07-04 RX ADMIN — PANTOPRAZOLE SODIUM 40 MG: 40 INJECTION, POWDER, FOR SOLUTION INTRAVENOUS at 09:07

## 2023-07-04 RX ADMIN — ALPRAZOLAM 0.25 MG: 0.25 TABLET ORAL at 04:07

## 2023-07-04 RX ADMIN — AMOXICILLIN AND CLAVULANATE POTASSIUM 1 TABLET: 875; 125 TABLET, FILM COATED ORAL at 08:07

## 2023-07-04 RX ADMIN — ALUMINUM HYDROXIDE, MAGNESIUM HYDROXIDE, AND DIMETHICONE 30 ML: 200; 20; 200 SUSPENSION ORAL at 08:07

## 2023-07-04 RX ADMIN — ALUMINUM HYDROXIDE, MAGNESIUM HYDROXIDE, AND DIMETHICONE 30 ML: 200; 20; 200 SUSPENSION ORAL at 10:07

## 2023-07-04 RX ADMIN — AMOXICILLIN AND CLAVULANATE POTASSIUM 1 TABLET: 875; 125 TABLET, FILM COATED ORAL at 09:07

## 2023-07-04 RX ADMIN — ALUMINUM HYDROXIDE, MAGNESIUM HYDROXIDE, AND DIMETHICONE 30 ML: 200; 20; 200 SUSPENSION ORAL at 11:07

## 2023-07-04 NOTE — ASSESSMENT & PLAN NOTE
S/p Open incisional hernia repair with mesh, partial omentectomy 06/20/2023   Advanced to a regular diet is full liquids was tolerated  - OOB, ambulation encouraged. Stressed the importance of getting out of bed and ambulating for postoperative recovery  -oral medications- IS 10xs/hr while awake  - PPx: SCDs, Lovenox   - Reflux medications, oral Pepcid.       if she tolerates a regular diet at home oxygen arrange the patient can be discharged provided she can obtain transportation

## 2023-07-04 NOTE — SUBJECTIVE & OBJECTIVE
Interval History:  Patient tolerated full liquids multiple bowel movements.  Will advance to a regular diet.  Will need home oxygen as room air was 81%.  She improved to 94% on oxygen while exercising.    Medications:  Continuous Infusions:  Scheduled Meds:   albuterol-ipratropium  3 mL Nebulization Q6H    ALPRAZolam  0.25 mg Oral TID    aluminum-magnesium hydroxide-simethicone  30 mL Oral QID (AC & HS)    amoxicillin-clavulanate 875-125mg  1 tablet Oral Q12H    famotidine  20 mg Oral BID    FLUoxetine  40 mg Oral Daily    fluticasone propionate  2 spray Each Nostril Daily    gabapentin  300 mg Oral TID    guaiFENesin  1,200 mg Oral BID    meloxicam  15 mg Oral Daily    montelukast  10 mg Oral QHS    sucralfate  1 g Oral Q6H     PRN Meds:aluminum-magnesium hydroxide-simethicone, diphenhydrAMINE, HYDROmorphone, HYDROmorphone, metoclopramide HCl, ondansetron     Review of patient's allergies indicates:   Allergen Reactions    Opioids - morphine analogues      Objective:     Vital Signs (Most Recent):  Temp: 98 °F (36.7 °C) (07/04/23 0701)  Pulse: 82 (07/04/23 0911)  Resp: 20 (07/04/23 0712)  BP: (!) 104/59 (07/04/23 0701)  SpO2: 96 % (07/04/23 0712) Vital Signs (24h Range):  Temp:  [97.4 °F (36.3 °C)-98.5 °F (36.9 °C)] 98 °F (36.7 °C)  Pulse:  [82-98] 82  Resp:  [18-20] 20  SpO2:  [94 %-98 %] 96 %  BP: ()/(58-84) 104/59     Weight: 109 kg (240 lb 4.8 oz)  Body mass index is 42.57 kg/m².    Intake/Output - Last 3 Shifts         07/02 0700  07/03 0659 07/03 0700 07/04 0659 07/04 0700  07/05 0659    P.O.  460     Total Intake(mL/kg)  460 (4.2)     Urine (mL/kg/hr) 0 (0) 0 (0) 150 (0.6)    Drains 15 2.5 5    Stool  2     Total Output 15 4.5 155    Net -15 +455.5 -155           Urine Occurrence 1 x 4 x     Stool Occurrence  3 x              Physical Exam  Constitutional:       Appearance: She is well-developed. She is obese. She is ill-appearing (some degree of chronic).   HENT:      Head: Normocephalic.   Eyes:       Pupils: Pupils are equal, round, and reactive to light.   Neck:      Thyroid: No thyromegaly.      Vascular: No JVD.      Trachea: No tracheal deviation.   Cardiovascular:      Rate and Rhythm: Normal rate and regular rhythm.      Heart sounds: Normal heart sounds.   Pulmonary:      Effort: Pulmonary effort is normal.      Breath sounds: Normal breath sounds. No wheezing.      Comments: Slightly coarse posteriorly  Abdominal:      General: Bowel sounds are normal. There is no distension.      Palpations: Abdomen is soft. Abdomen is not rigid. There is no mass.      Tenderness: There is no abdominal tenderness. There is no guarding or rebound.      Comments: Obese.  Incision is clean.  Drain removed with drain intact   Musculoskeletal:         General: Normal range of motion.      Right lower leg: No edema.      Left lower leg: No edema.   Lymphadenopathy:      Cervical: No cervical adenopathy.   Skin:     General: Skin is warm and dry.      Findings: No erythema or rash.   Neurological:      Mental Status: She is oriented to person, place, and time.   Psychiatric:         Mood and Affect: Mood normal.         Behavior: Behavior normal.         Thought Content: Thought content normal.         Judgment: Judgment normal.        Significant Labs:  I have reviewed all pertinent lab results within the past 24 hours.  CBC:   Recent Labs   Lab 07/03/23  0553   WBC 10.99  10.99   RBC 4.19  4.19   HGB 11.0*  11.0*   HCT 36.2*  36.2*     288   MCV 86  86   MCH 26.3*  26.3*   MCHC 30.4*  30.4*     BMP:   Recent Labs   Lab 07/03/23  0553   GLU 83  83     138   K 3.8  3.8     100   CO2 28  28   BUN 12  12   CREATININE 0.7  0.7   CALCIUM 8.4*  8.4*       Significant Diagnostics:  I have reviewed all pertinent imaging results/findings within the past 24 hours.  No new

## 2023-07-04 NOTE — ASSESSMENT & PLAN NOTE
- due  aspiration pneumonia  -- Cont O2  --s/p IV Unasyn  . Cont po Augmentin   --supportive therapy (mucinex, flonase, albuterol INH QID)  --enhanced covid/isolation precautions  -CTA : No PE . Patchy infiltrates are seen within the lower lobes concerning for aspiration or airspace disease or edema process aspiration     Con po Augmentin for a total of 10 day s  Ok to d/c per IM stant point on po antibiotic and o2  Pt qualify for home o2  Per home o2  eval

## 2023-07-04 NOTE — PLAN OF CARE
07/04/23 1037   Post-Acute Status   Post-Acute Authorization HME   HME Status Set-up Complete/Auth obtained   Hospital Resources/Appts/Education Provided Post-Acute resouces added to AVS   Discharge Delays None known at this time   Discharge Plan   Discharge Plan A Home;Home with family     [facility] approved pt's home oxygen. Equipment delivered to bedside. All questions answered.   Bedside nurse notified to provide education on home o2 use.

## 2023-07-04 NOTE — PLAN OF CARE
A230/A230 ZAINAB Nicolas is a 58 y.o.female admitted on 6/28/2023 for Incarcerated ventral hernia   Code Status: Full Code MRN: [id number]   Review of patient's allergies indicates:   Allergen Reactions    Opioids - morphine analogues      Past Medical History:   Diagnosis Date    Anxiety     Back pain     Depression       PRN meds    aluminum-magnesium hydroxide-simethicone, 30 mL, Q6H PRN  diphenhydrAMINE, 25 mg, Q6H PRN  HYDROmorphone, 0.5 mg, Q3H PRN  HYDROmorphone, 1 mg, Q4H PRN  metoclopramide HCl, 5 mg, Q6H PRN  ondansetron, 8 mg, Q8H PRN      Pt educated on the need to work with PT/OT. Pt verbalized understanding. PT called back for reevaluation. OT eval needed for  SNF placement eval. OT eval ordered. Cardiac monitor discharged. IV not removed. No falls were reported on shift and hourly rounding is complete. Chart check completed. Will continue plan of care.      Orientation: oriented x 4        Lead Monitored: Lead II Rhythm: normal sinus rhythm    Cardiac/Telemetry Box Number: [id number]  VTE Required Core Measure: (SCDs) Sequential compression device initiated/maintained, Pharmacological prophylaxis initiated/maintained Last Bowel Movement: 07/03/23  Diet Adult Regular (IDDSI Level 7)  Voiding Characteristics: voids spontaneously without difficulty  Kaushik Score: 17  Fall Risk Score: 9  Accucheck []   Freq?      Lines/Drains/Airways       Drain  Duration                  Closed/Suction Drain 06/28/23 1231 Midline Abdomen Bulb 15 Fr. 6 days              Peripheral Intravenous Line  Duration                  Peripheral IV - Single Lumen 07/03/23 1634 20 G Anterior;Left Forearm <1 day

## 2023-07-04 NOTE — PROGRESS NOTES
Evaluated by p physical therapy. It was felt that she needed to go to a skilled nursing facility. Will cancel discharge and ask case management to evaluate her for physical therapy.

## 2023-07-04 NOTE — CONSULTS
LIBRADO to address consult once home O2 order placed. [facility] rep notified of incoming order.    Bedside nurse updated.

## 2023-07-04 NOTE — PT/OT/SLP PROGRESS
"Physical Therapy  Treatment    Hamzah Nicolas   MRN: [id number]   Admitting Diagnosis: Incarcerated ventral hernia    PT Received On: 07/04/23  PT Start Time: 0915     PT Stop Time: 0925    PT Total Time (min): 10 min       Billable Minutes:  Therapeutic Activity 10    Treatment Type: Treatment  PT/PTA: PTA     Number of PTA visits since last PT visit: 1       General Precautions: Standard, fall  Orthopedic Precautions: N/A  Braces: N/A  Respiratory Status: High flow, flow 3 L/min         Subjective:  Communicated with patient's nurse, Cindi, and completed Epic chart review prior to session.  Patient adamantly refusing PT session at this time.     "I can't make a mess and if I move I will." "They told me not to move!" " I'm going home today and I don't need to do much."    Pain/Comfort  Pain Rating 1: 3/10  Location 1: abdomen  Pain Addressed 1: Cessation of Activity, Nurse notified  Pain Rating Post-Intervention 1: 3/10    Objective:   Patient found with: peripheral IV, oxygen, telemetry    Educated patient on importance of full and active participation in functional mobility training to prevent further loss of ROM and strength.   Encouraged patient to request assistance from nursing staff to get OOB at least 3x/day with all meals in addition to ambulating to/from the bathroom to promote recovery of CV endurance. Also encouraged patient to perform AROM TE to BLE throughout the day within all available planes of motion. Re enforced importance of utilizing call light to meet needs in room and not attempt to get up without staff assistance. Patient became emotional while therapist was attempting to educate patient and consistently would speak over therapist. Unsure of level of retention due to patient resistance to education.     AM-PAC 6 CLICK MOBILITY  How much help from another person does this patient currently need?   1 = Unable, Total/Dependent Assistance  2 = A lot, Maximum/Moderate Assistance  3 = A little, " Minimum/Contact Guard/Supervision  4 = None, Modified [place]/Independent    Turning over in bed (including adjusting bedclothes, sheets and blankets)?: 1 (REFUSED)  Sitting down on and standing up from a chair with arms (e.g., wheelchair, bedside commode, etc.): 1 (REFUSED)  Moving from lying on back to sitting on the side of the bed?: 1 (REFUSED)  Moving to and from a bed to a chair (including a wheelchair)?: 1 (REFUSED)  Need to walk in hospital room?: 1 (REFUSED)  Climbing 3-5 steps with a railing?: 1 (NT)  Basic Mobility Total Score: 6    AM-PAC Raw Score CMS G-Code Modifier Level of Impairment Assistance   6 % Total / Unable   7 - 9 CM 80 - 100% Maximal Assist   10 - 14 CL 60 - 80% Moderate Assist   15 - 19 CK 40 - 60% Moderate Assist   20 - 22 CJ 20 - 40% Minimal Assist   23 CI 1-20% SBA / CGA   24 CH 0% Independent/ Mod I     Patient left supine with call button in reach.    Assessment:  Hamzah Nicolas is a 58 y.o. female with a medical diagnosis of Incarcerated ventral hernia and presents with overall decline in functional mobility. Patient would continue to benefit from skilled PT to address functional limitations listed below in order to return to PLOF/decrease caregiver burden. Patient's refusal to participate in PT sessions remains her greatest limiting factor to recovery of function. Education was unsuccessful.     Rehab identified problem list/impairments: weakness, impaired endurance, impaired cardiopulmonary response to activity, impaired balance, gait instability, impaired functional mobility, impaired self care skills, decreased upper extremity function, decreased lower extremity function, decreased safety awareness, decreased ROM    Rehab potential is fair.    Activity tolerance: unable to determine    Discharge recommendations: home health PT, nursing facility, skilled (PENDING PROGRESS)      Barriers to discharge:      Equipment recommendations: to be determined by next level  of care     GOALS:   Multidisciplinary Problems       Physical Therapy Goals          Problem: Physical Therapy    Goal Priority Disciplines Outcome Goal Variances Interventions   Physical Therapy Goal     PT, PT/OT      Description: LT23  1. PT WILL COMPLETE BED MOBILITY IND FOR OOB TOLERANCE  2. PT WILL T/F TO CHAIR WITH LRAD WITH SBA  3. PT WILL GT TRAIN X 150' WITH LRAD TO PROGRESS MOBILITY                        PLAN:    Patient to be seen 3 x/week to address the above listed problems via gait training, therapeutic activities, therapeutic exercises  Plan of Care expires: 23  Plan of Care reviewed with: patient         2023

## 2023-07-04 NOTE — PROGRESS NOTES
[facility] Medicine  Progress Note    Patient Name: Hamzah Nicolas  MRN: [id number]  Patient Class: IP- Inpatient   Admission Date: 6/28/2023  Length of Stay: 6 days  Attending Physician: Marcus Hutchinson MD  Primary Care Provider: Lali Hatch MD        Subjective:     Principal Problem:Incarcerated ventral hernia        HPI:  The patient is a 58-year-old female with a past medical history notable for allergic sinusitis, rotator cuff tendonitis with subacromial impingement, lumbar disc disease with right lumbar radiculitis who presented with a 5-day history of an increasingly tender and red umbilical bulge. Her discomfort escalated, accompanied by nausea and vomiting, leading her to abstain from eating or drinking over the past few days and eventually seek emergency care. Upon arrival at the emergency room, work up revealed an incarcerated ventral hernia and underwent an open incisional hernia repair with mesh, in addition to a partial omentectomy (6/29/2023). Post-operative care included pain management, diet advancement, use of an incentive spirometer, application of a pneumatic compression device, administration of Lovenox, and physical therapy consultation. On 6/30, she developed acute respiratory failur. In light of these developments, Hospital Medicine was consulted to evaluate.    The time of our encounter, patient, despite appearing notably uncomfortable, she was not in acute distress and only complained of reflux while being maintained on 3-4L nasal cannula.  Upon further inquiring of problem, noted that her symptoms were triggered by an episode of persistent nausea and intractable vomiting, raising concerns about aspiration. Denies any fevers, chills, chest pain, or shortness a breath at this time. Also denies any recent sick contacts or recent travel.      Overview/Hospital Course:  7/1  She is now on  10 lt by nasal canula . CTA chest : No PE . Patchy infiltrates are  seen within the lower lobes concerning for aspiration or airspace disease or edema process aspiration . Abd XR show ileus . Started on IVAB . The kidney function is trending up .  7/2 Kidney function back to normal . She cont on 10 lt by nasal canula . NAEON .   7/3 She Had a BM  over the last 24 hrs .   She is on 5 lt by NC . Diet advance per general surgery .  The IVAB changed to po .   7/4 She is tolerating po intake . NAEON . She is on 3 lt by nasal canula . She had multiple BM since yesterday .       Interval History:     Review of Systems   Constitutional:  Negative for chills, fatigue and fever.   HENT:  Negative for congestion, postnasal drip, rhinorrhea and sore throat.    Eyes:  Negative for pain and visual disturbance.   Respiratory:  Positive for shortness of breath. Negative for cough, chest tightness and wheezing.    Cardiovascular:  Negative for chest pain, palpitations and leg swelling.   Gastrointestinal:  Negative for abdominal distention, abdominal pain, constipation, diarrhea, nausea and vomiting.   Genitourinary:  Negative for difficulty urinating, flank pain and hematuria.   Musculoskeletal:  Negative for arthralgias, back pain, gait problem and joint swelling.   Skin:  Negative for pallor and rash.   Neurological:  Negative for dizziness, syncope and weakness.   Psychiatric/Behavioral:  Negative for confusion, decreased concentration and suicidal ideas.    Objective:     Vital Signs (Most Recent):  Temp: 98 °F (36.7 °C) (07/04/23 0701)  Pulse: 82 (07/04/23 0911)  Resp: 20 (07/04/23 0712)  BP: (!) 104/59 (07/04/23 0701)  SpO2: 96 % (07/04/23 0712) Vital Signs (24h Range):  Temp:  [97.4 °F (36.3 °C)-98.5 °F (36.9 °C)] 98 °F (36.7 °C)  Pulse:  [82-98] 82  Resp:  [18-20] 20  SpO2:  [94 %-98 %] 96 %  BP: ()/(58-84) 104/59     Weight: 109 kg (240 lb 4.8 oz)  Body mass index is 42.57 kg/m².    Intake/Output Summary (Last 24 hours) at 7/4/2023 0959  Last data filed at 7/4/2023 0749  Gross per 24  hour   Intake 240 ml   Output 157.5 ml   Net 82.5 ml         Physical Exam  Vitals and nursing note reviewed.   Constitutional:       Appearance: She is well-developed. She is ill-appearing.   HENT:      Head: Normocephalic.   Eyes:      Extraocular Movements: Extraocular movements intact.      Pupils: Pupils are equal, round, and reactive to light.   Neck:      Thyroid: No thyromegaly.      Vascular: No JVD.      Trachea: No tracheal deviation.   Cardiovascular:      Rate and Rhythm: Normal rate and regular rhythm.      Heart sounds: Normal heart sounds.   Pulmonary:      Breath sounds: Rhonchi present. No wheezing.   Abdominal:      Palpations: Abdomen is not rigid.      Comments: Soft, mild distention; incision c/d/I without erythema or drainage;    Musculoskeletal:         General: Normal range of motion.      Right lower leg: No edema.      Left lower leg: No edema.   Lymphadenopathy:      Cervical: No cervical adenopathy.   Skin:     General: Skin is warm and dry.      Findings: No erythema or rash.   Neurological:      Mental Status: She is alert and oriented to person, place, and time.   Psychiatric:         Mood and Affect: Mood normal.         Behavior: Behavior normal.         Thought Content: Thought content normal.         Judgment: Judgment normal.           Significant Labs: All pertinent labs within the past 24 hours have been reviewed.  Recent Lab Results       None            Significant Imaging: I have reviewed all pertinent imaging results/findings within the past 24 hours.      Assessment/Plan:      * Incarcerated ventral hernia  --management per primary      HARSHAD (acute kidney injury)  Multifactorial due IVVD  Vs TOM  Natacha IVF  monitor UOP  And kidney function     Improving /resolved    Aspiration pneumonia  S/P  IVAB  Unasyn n  Cont po Augmentin   For a total of 10 days       Intractable vomiting with nausea  -NPO  -Abd xr = ileus     Resolved   Tolerating diet         Acute hypoxemic  respiratory failure  - due  aspiration pneumonia  -- Cont O2  --s/p IV Unasyn  . Cont po Augmentin   --supportive therapy (mucinex, flonase, albuterol INH QID)  --enhanced covid/isolation precautions  -CTA : No PE . Patchy infiltrates are seen within the lower lobes concerning for aspiration or airspace disease or edema process aspiration     Con po Augmentin for a total of 10 day s  Ok to d/c per IM stant point on po antibiotic and o2  Pt qualify for home o2  Per home o2  siddhartha   CM consulted for home o2       BMI 35.0-35.9,adult  Counseled       VTE Risk Mitigation (From admission, onward)           Ordered     IP VTE HIGH RISK PATIENT  Once         06/28/23 1414     Place sequential compression device  Until discontinued         06/28/23 1414                    Discharge Planning   ROSALVA: 7/4/2023     Code Status: Full Code   Is the patient medically ready for discharge?:     Reason for patient still in hospital (select all that apply): Treatment  Discharge Plan A: Home with family                  Jeferson Huff MD  Department of Hospital Medicine   [facility] - Telemetry ([facility])

## 2023-07-04 NOTE — SUBJECTIVE & OBJECTIVE
Interval History:     Review of Systems   Constitutional:  Negative for chills, fatigue and fever.   HENT:  Negative for congestion, postnasal drip, rhinorrhea and sore throat.    Eyes:  Negative for pain and visual disturbance.   Respiratory:  Positive for shortness of breath. Negative for cough, chest tightness and wheezing.    Cardiovascular:  Negative for chest pain, palpitations and leg swelling.   Gastrointestinal:  Negative for abdominal distention, abdominal pain, constipation, diarrhea, nausea and vomiting.   Genitourinary:  Negative for difficulty urinating, flank pain and hematuria.   Musculoskeletal:  Negative for arthralgias, back pain, gait problem and joint swelling.   Skin:  Negative for pallor and rash.   Neurological:  Negative for dizziness, syncope and weakness.   Psychiatric/Behavioral:  Negative for confusion, decreased concentration and suicidal ideas.    Objective:     Vital Signs (Most Recent):  Temp: 98 °F (36.7 °C) (07/04/23 0701)  Pulse: 82 (07/04/23 0911)  Resp: 20 (07/04/23 0712)  BP: (!) 104/59 (07/04/23 0701)  SpO2: 96 % (07/04/23 0712) Vital Signs (24h Range):  Temp:  [97.4 °F (36.3 °C)-98.5 °F (36.9 °C)] 98 °F (36.7 °C)  Pulse:  [82-98] 82  Resp:  [18-20] 20  SpO2:  [94 %-98 %] 96 %  BP: ()/(58-84) 104/59     Weight: 109 kg (240 lb 4.8 oz)  Body mass index is 42.57 kg/m².    Intake/Output Summary (Last 24 hours) at 7/4/2023 0959  Last data filed at 7/4/2023 0749  Gross per 24 hour   Intake 240 ml   Output 157.5 ml   Net 82.5 ml         Physical Exam  Vitals and nursing note reviewed.   Constitutional:       Appearance: She is well-developed. She is ill-appearing.   HENT:      Head: Normocephalic.   Eyes:      Extraocular Movements: Extraocular movements intact.      Pupils: Pupils are equal, round, and reactive to light.   Neck:      Thyroid: No thyromegaly.      Vascular: No JVD.      Trachea: No tracheal deviation.   Cardiovascular:      Rate and Rhythm: Normal rate and  regular rhythm.      Heart sounds: Normal heart sounds.   Pulmonary:      Breath sounds: Rhonchi present. No wheezing.   Abdominal:      Palpations: Abdomen is not rigid.      Comments: Soft, mild distention; incision c/d/I without erythema or drainage;    Musculoskeletal:         General: Normal range of motion.      Right lower leg: No edema.      Left lower leg: No edema.   Lymphadenopathy:      Cervical: No cervical adenopathy.   Skin:     General: Skin is warm and dry.      Findings: No erythema or rash.   Neurological:      Mental Status: She is alert and oriented to person, place, and time.   Psychiatric:         Mood and Affect: Mood normal.         Behavior: Behavior normal.         Thought Content: Thought content normal.         Judgment: Judgment normal.           Significant Labs: All pertinent labs within the past 24 hours have been reviewed.  Recent Lab Results       None            Significant Imaging: I have reviewed all pertinent imaging results/findings within the past 24 hours.

## 2023-07-04 NOTE — DISCHARGE INSTRUCTIONS
Please call for any fever, increase in pain, nausea or vomiting or redness or drainage from incision(s).    No lifting more than 30 pounds for  weeks    No driving if taking the pain medicine    May shower     Removed the glue when it becomes loose    If you become constipated from the pain medication you can use over the counter laxatives,  Miralax or Glycolax, or milk of magnesia for severe constipation.    Our office phone numbers are  [phone] and   you should hear from the office on Wednesday or Thursday for an appointment if you do not please    Our office fax  number is #[phone]

## 2023-07-04 NOTE — PT/OT/SLP PROGRESS
"Physical Therapy  Treatment    Hamzah Nicolas   MRN: [id number]   Admitting Diagnosis: Incarcerated ventral hernia    PT Received On: 07/04/23  PT Start Time: 1240     PT Stop Time: 1305    PT Total Time (min): 25 min       Billable Minutes:  Gait Training 15 and Therapeutic Exercise 10    Treatment Type: Treatment  PT/PTA: PT     Number of PTA visits since last PT visit: 0       General Precautions: Standard, fall  Orthopedic Precautions: N/A  Braces: N/A  Respiratory Status: Nasal cannula, flow 3 L/min         Subjective:  Communicated with NURSE JUAREZ AND Epic CHART REVIEW  prior to session.   P.T. SPOKE WITH PTA WHO REPORTED PT NOT WANTING TO PARTICIPATE IN P.T. EARLIER THIS AM. P.T. TO RETURN AND FOLLOW UP WITH PT.    Pain/Comfort  Pain Rating 1: 3/10  Location 1: abdomen  Pain Addressed 1: Distraction  Pain Rating Post-Intervention 1: 3/10    Objective:   Patient found with: peripheral IV, telemetry, oxygen    Functional Mobility:  PT MET IN RM SUP IN BED. PT LOG ROLLED TO RIGHT AND SEATED EOB WITH SBA. PT SCOOTED TO EOB AND STOOD WITH RW AND CGA. PT GT TRAINED X 60' WITH RW AND FF POSTURE WITH CUES FOR RW SAFETY AND POSTURE. PT RETURNED TO RM AND T/F TO CHAIR WITH RW AND YG . PT APPEARED IMPULSIVE AND TRYING TO SIT IN CHAIR QUICKLY LETTING GO OF RW. P.T. EDUCATED PT ON IMPORTANCE OF SAFE MOBILITY WITH RW TO DEC FALL RISKS.     Treatment and Education:  PT COMPLETED B LE TE X 10 REPS OF MIP, TKE, AND AP. PT EDUCATED ON PURSED LIP BREATHING. PT ALSO EDUCATED ON ROLE OF P.T. AND REC TO CONT TX EACH TIME THERAPY TIME PRESENTS TO ASSIST PT IN RETURN TO PLOF PT REPORTED  UNDERSTANDING. PT EDUCATED ON RISK FOR FALLS DUE TO GENERALIZED WEAKNESS, EDUCATED ON "CALL DON'T FALL", ENCOURAGED TO CALL FOR ASSISTANCE WITH ALL NEEDS SUCH AS BED<>CHAIR TRANSFERS OR TRIPS TO BATHROOM.      AM-PAC 6 CLICK MOBILITY  How much help from another person does this patient currently need?   1 = Unable, Total/Dependent Assistance  2 = " A lot, Maximum/Moderate Assistance  3 = A little, Minimum/Contact Guard/Supervision  4 = None, Modified [place]/Independent    Turning over in bed (including adjusting bedclothes, sheets and blankets)?: 4  Sitting down on and standing up from a chair with arms (e.g., wheelchair, bedside commode, etc.): 3  Moving from lying on back to sitting on the side of the bed?: 4  Moving to and from a bed to a chair (including a wheelchair)?: 3  Need to walk in hospital room?: 3  Climbing 3-5 steps with a railing?: 1  Basic Mobility Total Score: 18    AM-PAC Raw Score CMS G-Code Modifier Level of Impairment Assistance   6 % Total / Unable   7 - 9 CM 80 - 100% Maximal Assist   10 - 14 CL 60 - 80% Moderate Assist   15 - 19 CK 40 - 60% Moderate Assist   20 - 22 CJ 20 - 40% Minimal Assist   23 CI 1-20% SBA / CGA   24 CH 0% Independent/ Mod I     Patient left up in chair with call button in reach and NURSE ANN notified.    Assessment:  PT PROGRESSING WITH GROSS FUNC MOBILITY HOWEVER REQUIRES INC ASSIST FOR SAFETY AND IS QUICK TO FATIGUE.     Rehab identified problem list/impairments: weakness, impaired endurance, decreased ROM, decreased lower extremity function, impaired balance, gait instability, impaired cardiopulmonary response to activity, pain, impaired functional mobility, impaired self care skills, decreased upper extremity function    Rehab potential is good.    Activity tolerance: Fair    Discharge recommendations: nursing facility, skilled      Barriers to discharge:      Equipment recommendations: to be determined by next level of care     GOALS:   Multidisciplinary Problems       Physical Therapy Goals          Problem: Physical Therapy    Goal Priority Disciplines Outcome Goal Variances Interventions   Physical Therapy Goal     PT, PT/OT Ongoing, Progressing     Description: LT23  1. PT WILL COMPLETE BED MOBILITY IND FOR OOB TOLERANCE  2. PT WILL T/F TO CHAIR WITH LRAD WITH SBA  3. PT WILL GT  TRAIN X 150' WITH LRAD TO PROGRESS MOBILITY                        PLAN:    Patient to be seen 3 x/week to address the above listed problems via gait training, therapeutic activities, therapeutic exercises  Plan of Care expires: 07/16/23  Plan of Care reviewed with: patient         07/04/2023

## 2023-07-04 NOTE — PLAN OF CARE
[facility] - Telemetry (Hospital)  Discharge Final Note    Primary Care Provider: Lali Hatch MD    Expected Discharge Date: 7/4/2023    Final Discharge Note (most recent)       Final Note - 07/04/23 1039          Final Note    Assessment Type Final Discharge Note     Anticipated Discharge Disposition Home or Self Care     Hospital Resources/Appts/Education Provided Appointments scheduled and added to AVS        Post-Acute Status    Post-Acute Authorization HME     HME Status Set-up Complete/Auth obtained   Home 02 with [facility]    Discharge Delays None known at this time                     Important Message from Medicare              Follow-up providers       Marcus Hutchinson MD   Specialty: General Surgery    [address]  [place] [address]   Phone: [phone]       Next Steps: Follow up in 1 week(s)    Instructions: post op appt    The office will contact the patient for a follow-up appointment              After-discharge care                Durable Medical Equipment       [facility]   Service: Durable Medical Equipment    [address] [place] [id number]   Phone: [phone]

## 2023-07-05 PROCEDURE — 99900035 HC TECH TIME PER 15 MIN (STAT)

## 2023-07-05 PROCEDURE — 97530 THERAPEUTIC ACTIVITIES: CPT

## 2023-07-05 PROCEDURE — 94664 DEMO&/EVAL PT USE INHALER: CPT

## 2023-07-05 PROCEDURE — 27000221 HC OXYGEN, UP TO 24 HOURS

## 2023-07-05 PROCEDURE — 94761 N-INVAS EAR/PLS OXIMETRY MLT: CPT

## 2023-07-05 PROCEDURE — 97166 OT EVAL MOD COMPLEX 45 MIN: CPT

## 2023-07-05 PROCEDURE — 25000003 PHARM REV CODE 250: Performed by: SURGERY

## 2023-07-05 PROCEDURE — 25000242 PHARM REV CODE 250 ALT 637 W/ HCPCS: Performed by: NURSE PRACTITIONER

## 2023-07-05 PROCEDURE — 25000003 PHARM REV CODE 250: Performed by: STUDENT IN AN ORGANIZED HEALTH CARE EDUCATION/TRAINING PROGRAM

## 2023-07-05 PROCEDURE — 27000646 HC AEROBIKA DEVICE

## 2023-07-05 PROCEDURE — 21400001 HC TELEMETRY ROOM

## 2023-07-05 PROCEDURE — 94799 UNLISTED PULMONARY SVC/PX: CPT

## 2023-07-05 PROCEDURE — 25000003 PHARM REV CODE 250: Performed by: INTERNAL MEDICINE

## 2023-07-05 PROCEDURE — 94640 AIRWAY INHALATION TREATMENT: CPT

## 2023-07-05 RX ORDER — HYDROCODONE BITARTRATE AND ACETAMINOPHEN 5; 325 MG/1; MG/1
1 TABLET ORAL EVERY 4 HOURS PRN
Status: DISCONTINUED | OUTPATIENT
Start: 2023-07-05 | End: 2023-07-07 | Stop reason: HOSPADM

## 2023-07-05 RX ORDER — HYDROCODONE BITARTRATE AND ACETAMINOPHEN 7.5; 325 MG/1; MG/1
1 TABLET ORAL EVERY 6 HOURS PRN
Status: DISCONTINUED | OUTPATIENT
Start: 2023-07-05 | End: 2023-07-07 | Stop reason: HOSPADM

## 2023-07-05 RX ADMIN — GABAPENTIN 300 MG: 300 CAPSULE ORAL at 02:07

## 2023-07-05 RX ADMIN — MELOXICAM 15 MG: 7.5 TABLET ORAL at 08:07

## 2023-07-05 RX ADMIN — FAMOTIDINE 20 MG: 20 TABLET ORAL at 08:07

## 2023-07-05 RX ADMIN — ALPRAZOLAM 0.25 MG: 0.25 TABLET ORAL at 08:07

## 2023-07-05 RX ADMIN — AMOXICILLIN AND CLAVULANATE POTASSIUM 1 TABLET: 875; 125 TABLET, FILM COATED ORAL at 08:07

## 2023-07-05 RX ADMIN — ALUMINUM HYDROXIDE, MAGNESIUM HYDROXIDE, AND DIMETHICONE 30 ML: 200; 20; 200 SUSPENSION ORAL at 08:07

## 2023-07-05 RX ADMIN — SUCRALFATE 1 G: 1 SUSPENSION ORAL at 05:07

## 2023-07-05 RX ADMIN — ALPRAZOLAM 0.25 MG: 0.25 TABLET ORAL at 02:07

## 2023-07-05 RX ADMIN — IPRATROPIUM BROMIDE AND ALBUTEROL SULFATE 3 ML: 2.5; .5 SOLUTION RESPIRATORY (INHALATION) at 07:07

## 2023-07-05 RX ADMIN — GABAPENTIN 300 MG: 300 CAPSULE ORAL at 08:07

## 2023-07-05 RX ADMIN — HYDROCODONE BITARTRATE AND ACETAMINOPHEN 1 TABLET: 7.5; 325 TABLET ORAL at 10:07

## 2023-07-05 RX ADMIN — GUAIFENESIN 1200 MG: 600 TABLET, EXTENDED RELEASE ORAL at 08:07

## 2023-07-05 RX ADMIN — SUCRALFATE 1 G: 1 SUSPENSION ORAL at 11:07

## 2023-07-05 RX ADMIN — IPRATROPIUM BROMIDE AND ALBUTEROL SULFATE 3 ML: 2.5; .5 SOLUTION RESPIRATORY (INHALATION) at 01:07

## 2023-07-05 RX ADMIN — MONTELUKAST 10 MG: 10 TABLET, FILM COATED ORAL at 08:07

## 2023-07-05 RX ADMIN — IPRATROPIUM BROMIDE AND ALBUTEROL SULFATE 3 ML: 2.5; .5 SOLUTION RESPIRATORY (INHALATION) at 08:07

## 2023-07-05 RX ADMIN — FLUOXETINE 40 MG: 20 CAPSULE ORAL at 08:07

## 2023-07-05 RX ADMIN — ALUMINUM HYDROXIDE, MAGNESIUM HYDROXIDE, AND DIMETHICONE 30 ML: 200; 20; 200 SUSPENSION ORAL at 10:07

## 2023-07-05 RX ADMIN — FLUTICASONE PROPIONATE 100 MCG: 50 SPRAY, METERED NASAL at 08:07

## 2023-07-05 NOTE — ASSESSMENT & PLAN NOTE
S/p Open incisional hernia repair with mesh, partial omentectomy 06/20/2023   Regular diet   Supplemental oxygen   Patient needs to go to skilled nursing due to her limited mobility per evaluation of physical therapy.      Patient is stable for discharge once skilled nursing has been arranged by case management

## 2023-07-05 NOTE — ASSESSMENT & PLAN NOTE
Treatment per hospital medicine   Home oxygen has been arranged, but will no longer be needed as the patient is going to a skilled nursing facility if accepted.  If she is not accepted then she will need home oxygen

## 2023-07-05 NOTE — PT/OT/SLP EVAL
"Occupational Therapy   Evaluation    Name: Hamzah Nicolas  MRN: [id number]  Admitting Diagnosis: Incarcerated ventral hernia  Recent Surgery: Procedure(s) (LRB):  REPAIR, HERNIA, UMBILICAL, INCARCERATED, AGE 4 YEARS OR OLDER (N/A)  OMENTECTOMY (N/A) 7 Days Post-Op    Recommendations:     Discharge Recommendations: nursing facility, skilled  Discharge Equipment Recommendations:  to be determined by next level of care  Barriers to discharge:  Decreased caregiver support, Inaccessible home environment    Assessment:     Hamzah Nicolas is a 58 y.o. female with a medical diagnosis of Incarcerated ventral hernia.  She presents with the following performance deficits affecting function: weakness, impaired endurance, impaired self care skills, impaired functional mobility, gait instability, impaired balance, decreased coordination, decreased upper extremity function, decreased lower extremity function, decreased safety awareness, decreased ROM, impaired cardiopulmonary response to activity.      Rehab Prognosis: Good; patient would benefit from acute skilled OT services to address these deficits and reach maximum level of function.       Plan:     Patient to be seen 2 x/week to address the above listed problems via self-care/home management, therapeutic activities, therapeutic exercises  Plan of Care Expires: 07/19/23  Plan of Care Reviewed with: patient    Subjective     Chief Complaint: fatigue, weakness  Patient/Family Comments/goals: go home    Occupational Profile:  Living Environment: lives with mother in a 2nd floor apartment with 16 steps and 1 railing to enter. No elevator access. 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  Previous level of function: Pt Mod (I) - (I) with ADLs and (I) with functional mobility.  Roles and Routines: does not drive or work  Equipment Used at Home: shower chair, grab bar  Assistance upon Discharge: unknown    Pain/Comfort:  Pain Rating 1: 0/10    "I just ate, and I don't want to have a " "BM. I'm worn out. They just bathed me. I already sat in the chair."  Objective:     Communicated with: Nurse and epic chart review prior to session.  Patient found supine with peripheral IV, telemetry, oxygen upon OT entry to room.    General Precautions: Standard, fall  Orthopedic Precautions: N/A  Braces: N/A  Respiratory Status: Nasal cannula, flow 3 L/min    Occupational Performance:    Bed Mobility:    Patient completed Rolling/Turning to Right with contact guard assistance  Patient completed Scooting/Bridging with contact guard assistance  Patient completed Supine to Sit with contact guard assistance  Patient completed Sit to Supine with contact guard assistance    Functional Mobility/Transfers:  Patient completed Sit <> Stand Transfer with minimum assistance  with  rolling walker and elevated bed height   Functional Mobility: Pt performed ~5 side steps to R side with Min A and RW.   Pt refusing further forward ambulation at this time d/t fatigue.     Activities of Daily Living:  Lower Body Dressing: maximal assistance dona socks.    Cognitive/Visual Perceptual:  Cognitive/Psychosocial Skills:     -       Oriented to: Person, Place, Time, and Situation   -       Follows Commands/attention:Follows two-step commands  -       Communication: clear/fluent  -       Safety awareness/insight to disability: impaired   -       Mood/Affect/Coping skills/emotional control: Tearful and Lethargic    Physical Exam:  Sensation:    -       Intact  Dominant hand:    -       right  Upper Extremity Range of Motion:     -       Right Upper Extremity: limited AROM in shoulder ~30 degrees d/t tendonitis per pt, Elbow WFL.  -       Left Upper Extremity: WFL  Upper Extremity Strength:    -       Right Upper Extremity: 3-/5 shoulder, 4/5 elbow  -       Left Upper Extremity: 4-/5 shoulder, 4/5 elbow   Strength:    -       Right Upper Extremity: fair  -       Left Upper Extremity: fair    AMPAC 6 Click ADL:  AMPAC Total Score: " 15    Treatment & Education:  Pt requiring max encouragement to participate in OT eval at this time. Patient educated on role of OT in acute setting and benefits of participation. Educated on techniques to use to increase independence and decrease fall risk with functional transfers. Educated on importance of OOB activity and calling for A to transfer and meet needs. Encouraged completion of B UE AROM therex throughout the day to tolerance to increase functional strength and activity tolerance. Educated patient on importance of increased tolerance to upright position and direct impact on CV endurance and strength. Patient encouraged to sit up in chair for a minimum of 2 consecutive hours per day. Patient stated understanding and in agreement with POC.     Patient left HOB elevated with all lines intact, call button in reach, and bed alarm on    GOALS:   Multidisciplinary Problems       Occupational Therapy Goals          Problem: Occupational Therapy    Goal Priority Disciplines Outcome Interventions   Occupational Therapy Goal     OT, PT/OT     Description: Goals to be met by: 7/19/23     Patient will increase functional independence with ADLs by performing:    LE Dressing with Contact Guard Assistance.  Grooming while standing at sink with Set-up Assistance.  Toileting from toilet with Set-up Assistance for hygiene and clothing management.   Stand pivot transfers with Supervision with RW.  Upper extremity exercise program x15 reps per handout, with supervision.                         History:     Past Medical History:   Diagnosis Date    Anxiety     Back pain     Depression          Past Surgical History:   Procedure Laterality Date    CHOLECYSTECTOMY      HYSTERECTOMY      OMENTECTOMY N/A 6/28/2023    Procedure: OMENTECTOMY;  Surgeon: Marcus Hutchinson MD;  Location: [facility];  Service: General;  Laterality: N/A;    REPAIR OF INCARCERATED UMBILICAL HERNIA N/A 6/28/2023    Procedure: REPAIR, HERNIA, UMBILICAL,  INCARCERATED, AGE 5 YEARS OR OLDER;  Surgeon: Marcus Hutchinson MD;  Location: [facility];  Service: General;  Laterality: N/A;       Time Tracking:     OT Date of Treatment: 07/05/23  OT Start Time: 0950  OT Stop Time: 1015  OT Total Time (min): 25 min    Billable Minutes:Evaluation 15  Therapeutic Activity 10    7/5/2023  Oma Kim OT

## 2023-07-05 NOTE — PROGRESS NOTES
[facility]  General Surgery  Progress Note    Subjective:     History of Present Illness:  58-year-old female with a 5 day history of bulging at the umbilicus.  The area has become red and tender.  The pain increased and she presented to the emergency room.  She had associated nausea and vomiting She has not had anything to eat and drink in a few days.  She denies any fever or chills.  She is not on any anticoagulation medicines.      Patient was evaluated in the emergency room and a urgent surgical consult was obtained.      Post-Op Info:  Procedure(s) (LRB):  REPAIR, HERNIA, UMBILICAL, INCARCERATED, AGE 5 YEARS OR OLDER (N/A)  OMENTECTOMY (N/A)   7 Days Post-Op     Interval History:  Patient states she is feeling better.  She is tolerating a diet.  She does require home oxygen.      Physical therapy has determined that the patient needs to go to a skilled nursing facility for additional physical therapy.  She is stable for discharge once this has been arranged    Medications:  Continuous Infusions:  Scheduled Meds:   albuterol-ipratropium  3 mL Nebulization Q6H    ALPRAZolam  0.25 mg Oral TID    aluminum-magnesium hydroxide-simethicone  30 mL Oral QID (AC & HS)    amoxicillin-clavulanate 875-125mg  1 tablet Oral Q12H    famotidine  20 mg Oral BID    FLUoxetine  40 mg Oral Daily    fluticasone propionate  2 spray Each Nostril Daily    gabapentin  300 mg Oral TID    guaiFENesin  1,200 mg Oral BID    meloxicam  15 mg Oral Daily    montelukast  10 mg Oral QHS    sucralfate  1 g Oral Q6H     PRN Meds:aluminum-magnesium hydroxide-simethicone, diphenhydrAMINE, HYDROmorphone, HYDROmorphone, metoclopramide HCl, ondansetron     Review of patient's allergies indicates:   Allergen Reactions    Opioids - morphine analogues      Objective:     Vital Signs (Most Recent):  Temp: 99.2 °F (37.3 °C) (07/05/23 0713)  Pulse: 84 (07/05/23 0815)  Resp: 18 (07/05/23 0815)  BP: 124/74 (07/05/23 0713)  SpO2:  (!) 94 % (07/05/23 0815) Vital Signs (24h Range):  Temp:  [98.2 °F (36.8 °C)-100 °F (37.8 °C)] 99.2 °F (37.3 °C)  Pulse:  [] 84  Resp:  [16-24] 18  SpO2:  [90 %-96 %] 94 %  BP: (110-143)/(68-76) 124/74     Weight: 107.3 kg (236 lb 8.9 oz)  Body mass index is 41.9 kg/m².    Intake/Output - Last 3 Shifts         07/03 0700 07/04 0659 07/04 0700 07/05 0659 07/05 0700 07/06 0659    P.O. 460 720 240    I.V. (mL/kg)  0 (0)     Total Intake(mL/kg) 460 (4.2) 720 (6.6) 240 (2.2)    Urine (mL/kg/hr) 0 (0) 150 (0.1)     Drains 2.5 5     Stool 2      Total Output 4.5 155     Net +455.5 +565 +240           Urine Occurrence 4 x 3 x     Stool Occurrence 3 x 1 x              Physical Exam  Vitals and nursing note reviewed.   Constitutional:       Appearance: She is well-developed. She is obese.   HENT:      Head: Normocephalic.   Eyes:      Pupils: Pupils are equal, round, and reactive to light.   Neck:      Thyroid: No thyromegaly.      Vascular: No JVD.      Trachea: No tracheal deviation.   Cardiovascular:      Rate and Rhythm: Normal rate and regular rhythm.      Heart sounds: Normal heart sounds.   Pulmonary:      Effort: Pulmonary effort is normal.      Breath sounds: No wheezing.      Comments: Coarse at the bases  Abdominal:      General: Bowel sounds are normal. There is no distension.      Palpations: Abdomen is soft. Abdomen is not rigid. There is no mass.      Tenderness: There is no abdominal tenderness. There is no guarding or rebound.      Comments: Abdominal incision is clean.  The abdomen is obese.   Musculoskeletal:         General: Normal range of motion.      Right lower leg: No edema.      Left lower leg: No edema.   Lymphadenopathy:      Cervical: No cervical adenopathy.   Skin:     General: Skin is warm and dry.      Findings: No erythema or rash.   Neurological:      Mental Status: She is oriented to person, place, and time.        Significant Labs:  I have reviewed all pertinent lab results within  the past 24 hours.  CBC:   Recent Labs   Lab 07/03/23  0553   WBC 10.99  10.99   RBC 4.19  4.19   HGB 11.0*  11.0*   HCT 36.2*  36.2*     288   MCV 86  86   MCH 26.3*  26.3*   MCHC 30.4*  30.4*     BMP:   Recent Labs   Lab 07/03/23  0553   GLU 83  83     138   K 3.8  3.8     100   CO2 28  28   BUN 12  12   CREATININE 0.7  0.7   CALCIUM 8.4*  8.4*       Significant Diagnostics:  I have reviewed all pertinent imaging results/findings within the past 24 hours.  No new    Assessment/Plan:     * Incarcerated ventral hernia  S/p Open incisional hernia repair with mesh, partial omentectomy 06/20/2023   Regular diet   Supplemental oxygen   Patient needs to go to skilled nursing due to her limited mobility per evaluation of physical therapy.      Patient is stable for discharge once skilled nursing has been arranged by case management    HARSHAD (acute kidney injury)  Treatment per hospital medicine , resolved    Aspiration pneumonia  Treatment per hospital medicine     Oral abx per medicine    Intractable vomiting with nausea  Resolve    Acute hypoxemic respiratory failure  Treatment per hospital medicine   Home oxygen has been arranged, but will no longer be needed as the patient is going to a skilled nursing facility if accepted.  If she is not accepted then she will need home oxygen    BMI 35.0-35.9,adult  Encouraged weight loss        Marcus Hutchinson MD  General Surgery  [facility] - Telemetry ([facility])

## 2023-07-05 NOTE — PLAN OF CARE
A230/A230 ZAINAB Nicolas is a 58 y.o.female admitted on 6/28/2023 for Incarcerated ventral hernia   Code Status: Full Code MRN: [id number]   Review of patient's allergies indicates:   Allergen Reactions    Opioids - morphine analogues      Past Medical History:   Diagnosis Date    Anxiety     Back pain     Depression       PRN meds    aluminum-magnesium hydroxide-simethicone, 30 mL, Q6H PRN  diphenhydrAMINE, 25 mg, Q6H PRN  HYDROmorphone, 0.5 mg, Q3H PRN  HYDROmorphone, 1 mg, Q4H PRN  metoclopramide HCl, 5 mg, Q6H PRN  ondansetron, 8 mg, Q8H PRN      Pt worked with therapy. Pt walked to the bathroom with assistance. No falls were reported on shift and hourly rounding is complete. Lab monitoring continues. Chart check completed. Will continue plan of care.      Orientation: oriented x 4        Lead Monitored: Lead II Rhythm: normal sinus rhythm    Cardiac/Telemetry Box Number: [id number]  VTE Required Core Measure: (SCDs) Sequential compression device initiated/maintained, Pharmacological prophylaxis initiated/maintained Last Bowel Movement: 07/04/23  Diet Adult Regular (IDDSI Level 7)  Voiding Characteristics: voids spontaneously without difficulty  Kaushik Score: 19  Fall Risk Score: 9  Accucheck []   Freq?      Lines/Drains/Airways       Peripheral Intravenous Line  Duration                  Peripheral IV - Single Lumen 07/03/23 1634 20 G Anterior;Left Forearm 1 day

## 2023-07-05 NOTE — PLAN OF CARE
Problem: Adult Inpatient Plan of Care  Goal: Plan of Care Review  Outcome: Ongoing, Progressing  Goal: Patient-Specific Goal (Individualized)  Outcome: Ongoing, Progressing  Goal: Absence of Hospital-Acquired Illness or Injury  Outcome: Ongoing, Progressing  Goal: Optimal Comfort and Wellbeing  Outcome: Ongoing, Progressing  Goal: Readiness for Transition of Care  Outcome: Ongoing, Progressing     Problem: Skin Injury Risk Increased  Goal: Skin Health and Integrity  Outcome: Ongoing, Progressing     Problem: Infection  Goal: Absence of Infection Signs and Symptoms  Outcome: Ongoing, Progressing     Problem: Bariatric Environmental Safety  Goal: Safety Maintained with Care  Outcome: Ongoing, Progressing     Problem: Fall Injury Risk  Goal: Absence of Fall and Fall-Related Injury  Outcome: Ongoing, Progressing     Problem: Nausea and Vomiting  Goal: Fluid and Electrolyte Balance  Outcome: Ongoing, Progressing     Problem: Fluid and Electrolyte Imbalance (Acute Kidney Injury/Impairment)  Goal: Fluid and Electrolyte Balance  Outcome: Ongoing, Progressing     Problem: Oral Intake Inadequate (Acute Kidney Injury/Impairment)  Goal: Optimal Nutrition Intake  Outcome: Ongoing, Progressing     Problem: Renal Function Impairment (Acute Kidney Injury/Impairment)  Goal: Effective Renal Function  Outcome: Ongoing, Progressing     Problem: Activity Intolerance (Pulmonary Impairment)  Goal: Improved Activity Tolerance  Outcome: Ongoing, Progressing     Problem: Airway Clearance Ineffective (Pulmonary Impairment)  Goal: Effective Airway Clearance  Outcome: Ongoing, Progressing     Problem: Gas Exchange Impaired (Pulmonary Impairment)  Goal: Optimal Gas Exchange  Outcome: Ongoing, Progressing     Problem: Fatigue  Goal: Improved Activity Tolerance  Outcome: Ongoing, Progressing

## 2023-07-05 NOTE — SUBJECTIVE & OBJECTIVE
Interval History:  Patient states she is feeling better.  She is tolerating a diet.  She does require home oxygen.      Physical therapy has determined that the patient needs to go to a skilled nursing facility for additional physical therapy.  She is stable for discharge once this has been arranged    Medications:  Continuous Infusions:  Scheduled Meds:   albuterol-ipratropium  3 mL Nebulization Q6H    ALPRAZolam  0.25 mg Oral TID    aluminum-magnesium hydroxide-simethicone  30 mL Oral QID (AC & HS)    amoxicillin-clavulanate 875-125mg  1 tablet Oral Q12H    famotidine  20 mg Oral BID    FLUoxetine  40 mg Oral Daily    fluticasone propionate  2 spray Each Nostril Daily    gabapentin  300 mg Oral TID    guaiFENesin  1,200 mg Oral BID    meloxicam  15 mg Oral Daily    montelukast  10 mg Oral QHS    sucralfate  1 g Oral Q6H     PRN Meds:aluminum-magnesium hydroxide-simethicone, diphenhydrAMINE, HYDROmorphone, HYDROmorphone, metoclopramide HCl, ondansetron     Review of patient's allergies indicates:   Allergen Reactions    Opioids - morphine analogues      Objective:     Vital Signs (Most Recent):  Temp: 99.2 °F (37.3 °C) (07/05/23 0713)  Pulse: 84 (07/05/23 0815)  Resp: 18 (07/05/23 0815)  BP: 124/74 (07/05/23 0713)  SpO2: (!) 94 % (07/05/23 0815) Vital Signs (24h Range):  Temp:  [98.2 °F (36.8 °C)-100 °F (37.8 °C)] 99.2 °F (37.3 °C)  Pulse:  [] 84  Resp:  [16-24] 18  SpO2:  [90 %-96 %] 94 %  BP: (110-143)/(68-76) 124/74     Weight: 107.3 kg (236 lb 8.9 oz)  Body mass index is 41.9 kg/m².    Intake/Output - Last 3 Shifts         07/03 0700  07/04 0659 07/04 0700  07/05 0659 07/05 0700  07/06 0659    P.O. 460 720 240    I.V. (mL/kg)  0 (0)     Total Intake(mL/kg) 460 (4.2) 720 (6.6) 240 (2.2)    Urine (mL/kg/hr) 0 (0) 150 (0.1)     Drains 2.5 5     Stool 2      Total Output 4.5 155     Net +455.5 +565 +240           Urine Occurrence 4 x 3 x     Stool Occurrence 3 x 1 x              Physical Exam  Vitals and  nursing note reviewed.   Constitutional:       Appearance: She is well-developed. She is obese.   HENT:      Head: Normocephalic.   Eyes:      Pupils: Pupils are equal, round, and reactive to light.   Neck:      Thyroid: No thyromegaly.      Vascular: No JVD.      Trachea: No tracheal deviation.   Cardiovascular:      Rate and Rhythm: Normal rate and regular rhythm.      Heart sounds: Normal heart sounds.   Pulmonary:      Effort: Pulmonary effort is normal.      Breath sounds: No wheezing.      Comments: Coarse at the bases  Abdominal:      General: Bowel sounds are normal. There is no distension.      Palpations: Abdomen is soft. Abdomen is not rigid. There is no mass.      Tenderness: There is no abdominal tenderness. There is no guarding or rebound.      Comments: Abdominal incision is clean.  The abdomen is obese.   Musculoskeletal:         General: Normal range of motion.      Right lower leg: No edema.      Left lower leg: No edema.   Lymphadenopathy:      Cervical: No cervical adenopathy.   Skin:     General: Skin is warm and dry.      Findings: No erythema or rash.   Neurological:      Mental Status: She is oriented to person, place, and time.        Significant Labs:  I have reviewed all pertinent lab results within the past 24 hours.  CBC:   Recent Labs   Lab 07/03/23  0553   WBC 10.99  10.99   RBC 4.19  4.19   HGB 11.0*  11.0*   HCT 36.2*  36.2*     288   MCV 86  86   MCH 26.3*  26.3*   MCHC 30.4*  30.4*     BMP:   Recent Labs   Lab 07/03/23  0553   GLU 83  83     138   K 3.8  3.8     100   CO2 28  28   BUN 12  12   CREATININE 0.7  0.7   CALCIUM 8.4*  8.4*       Significant Diagnostics:  I have reviewed all pertinent imaging results/findings within the past 24 hours.  No new

## 2023-07-05 NOTE — PLAN OF CARE
PT/OT recommending SNF.  Dr. Hutchinson gave verbal request to seek SNF placement.    Preference obtained for first Available.  Referrals sent to [place], [facility]; [facility] and [facility].       07/05/23 1333   Post-Acute Status   Post-Acute Authorization Placement   Post-Acute Placement Status Referrals Sent   Discharge Plan   Discharge Plan A Skilled Nursing Facility     12:30pm Patient accepted by [facility], [facility] and [facility].  Patient chose to go to [facility].  Spoke with Mikey.  She will submit for authorization.

## 2023-07-05 NOTE — PT/OT/SLP PROGRESS
"Physical Therapy Treatment    Patient Name:  Hamzah Nicolas   MRN:  [id number]    Recommendations:     Discharge Recommendations: nursing facility, skilled  Discharge Equipment Recommendations: to be determined by next level of care  Barriers to discharge: Inaccessible home (16 steps to enter) and Decreased caregiver support    Assessment:     Hamzah Nicolas is a 58 y.o. female admitted with a medical diagnosis of Incarcerated ventral hernia.  She presents with the following impairments/functional limitations: weakness, impaired endurance, impaired functional mobility, gait instability, impaired balance, pain, decreased safety awareness, decreased lower extremity function, decreased coordination, impaired cardiopulmonary response to activity.    Rehab Prognosis: Fair; patient would benefit from acute skilled PT services to address these deficits and reach maximum level of function.    Recent Surgery: Procedure(s) (LRB):  REPAIR, HERNIA, UMBILICAL, INCARCERATED, AGE 4 YEARS OR OLDER (N/A)  OMENTECTOMY (N/A) 7 Days Post-Op    Plan:     During this hospitalization, patient to be seen 3 x/week to address the identified rehab impairments via gait training, therapeutic activities, therapeutic exercises and progress toward the following goals:    Plan of Care Expires:  07/16/23    Subjective     Chief Complaint: Pt lethargic upon arrival, tearful, "I just want to go home", pt agreed to participate but refused ambulation stating, "I just ate and I don't want to have a BM when walking"  Patient/Family Comments/goals: To go home  Pain/Comfort:  Pain Rating 1: 0/10      Objective:     Communicated with nurse Puente prior to session.  Patient found supine with peripheral IV, telemetry, oxygen upon PT entry to room.     General Precautions: Standard, fall  Orthopedic Precautions: N/A  Braces: N/A  Respiratory Status: Nasal cannula, flow 3 L/min     Functional Mobility:  Bed Mobility:     Rolling Right: contact guard " "assistance  Scooting: contact guard assistance  Supine to Sit: contact guard assistance  Sit to Supine: contact guard assistance  Transfers:     Sit to Stand:  minimum assistance with rolling walker  Bed to Chair: pt refused  Gait: Pt able to take 3 side steps to the R towards the HOB  Balance: Demonstrated good sitting balance, poor dynamic balance when standing    AM-PAC 6 CLICK MOBILITY  Turning over in bed (including adjusting bedclothes, sheets and blankets)?: 3  Sitting down on and standing up from a chair with arms (e.g., wheelchair, bedside commode, etc.): 3  Moving from lying on back to sitting on the side of the bed?: 3  Moving to and from a bed to a chair (including a wheelchair)?: 1 (NT)  Need to walk in hospital room?: 1 (NT)  Climbing 3-5 steps with a railing?: 1 (NT)  Basic Mobility Total Score: 12     Treatment & Education:  Pt tolerated interventions fair. Pt stated she "just wants to go home", but refused progression of functional mobility, educated on the importance of PT for her recovery. Reviewed importance of OOB activities and HEP (hip flex, LAQ, ham curls, ankle pumps) in order to maintain/regain strength. Reviewed proper use of RW for safety and to reduce risk of falling. Reviewed increased risk of falling due to weakness, instructed to utilize call bell for assistance for all transfers. Pt agreeable to all requests.    Patient left supine with all lines intact and call button in reach..    GOALS:   Multidisciplinary Problems       Physical Therapy Goals          Problem: Physical Therapy    Goal Priority Disciplines Outcome Goal Variances Interventions   Physical Therapy Goal     PT, PT/OT Ongoing, Progressing     Description: LT23  1. PT WILL COMPLETE BED MOBILITY MOD I FOR OOB TOLERANCE  2. PT WILL T/F TO CHAIR WITH LRAD WITH CGA  3. PT WILL GT TRAIN X 150' WITH LRAD TO PROGRESS MOBILITY                        Time Tracking:     PT Received On: 23  PT Start Time: 955     PT " Stop Time: 1020  PT Total Time (min): 25 min     Billable Minutes: Therapeutic Activity 25min    Treatment Type: Treatment  PT/PTA: PT     Number of PTA visits since last PT visit: 0     07/05/2023

## 2023-07-05 NOTE — PROGRESS NOTES
[facility]  General Surgery  Progress Note    Subjective:     History of Present Illness:  58-year-old female with a 5 day history of bulging at the umbilicus.  The area has become red and tender.  The pain increased and she presented to the emergency room.  She had associated nausea and vomiting She has not had anything to eat and drink in a few days.  She denies any fever or chills.  She is not on any anticoagulation medicines.      Patient was evaluated in the emergency room and a urgent surgical consult was obtained.      Post-Op Info:  Procedure(s) (LRB):  REPAIR, HERNIA, UMBILICAL, INCARCERATED, AGE 5 YEARS OR OLDER (N/A)  OMENTECTOMY (N/A)   7 Days Post-Op     Interval History:  Patient tolerated full liquids multiple bowel movements.  Will advance to a regular diet.  Will need home oxygen as room air was 81%.  She improved to 94% on oxygen while exercising.    Medications:  Continuous Infusions:  Scheduled Meds:   albuterol-ipratropium  3 mL Nebulization Q6H    ALPRAZolam  0.25 mg Oral TID    aluminum-magnesium hydroxide-simethicone  30 mL Oral QID (AC & HS)    amoxicillin-clavulanate 875-125mg  1 tablet Oral Q12H    famotidine  20 mg Oral BID    FLUoxetine  40 mg Oral Daily    fluticasone propionate  2 spray Each Nostril Daily    gabapentin  300 mg Oral TID    guaiFENesin  1,200 mg Oral BID    meloxicam  15 mg Oral Daily    montelukast  10 mg Oral QHS    sucralfate  1 g Oral Q6H     PRN Meds:aluminum-magnesium hydroxide-simethicone, diphenhydrAMINE, HYDROmorphone, HYDROmorphone, metoclopramide HCl, ondansetron     Review of patient's allergies indicates:   Allergen Reactions    Opioids - morphine analogues      Objective:     Vital Signs (Most Recent):  Temp: 98 °F (36.7 °C) (07/04/23 0701)  Pulse: 82 (07/04/23 0911)  Resp: 20 (07/04/23 0712)  BP: (!) 104/59 (07/04/23 0701)  SpO2: 96 % (07/04/23 0712) Vital Signs (24h Range):  Temp:  [97.4 °F (36.3 °C)-98.5 °F (36.9 °C)] 98  °F (36.7 °C)  Pulse:  [82-98] 82  Resp:  [18-20] 20  SpO2:  [94 %-98 %] 96 %  BP: ()/(58-84) 104/59     Weight: 109 kg (240 lb 4.8 oz)  Body mass index is 42.57 kg/m².    Intake/Output - Last 3 Shifts         07/02 0700  07/03 0659 07/03 0700  07/04 0659 07/04 0700  07/05 0659    P.O.  460     Total Intake(mL/kg)  460 (4.2)     Urine (mL/kg/hr) 0 (0) 0 (0) 150 (0.6)    Drains 15 2.5 5    Stool  2     Total Output 15 4.5 155    Net -15 +455.5 -155           Urine Occurrence 1 x 4 x     Stool Occurrence  3 x              Physical Exam  Constitutional:       Appearance: She is well-developed. She is obese. She is ill-appearing (some degree of chronic).   HENT:      Head: Normocephalic.   Eyes:      Pupils: Pupils are equal, round, and reactive to light.   Neck:      Thyroid: No thyromegaly.      Vascular: No JVD.      Trachea: No tracheal deviation.   Cardiovascular:      Rate and Rhythm: Normal rate and regular rhythm.      Heart sounds: Normal heart sounds.   Pulmonary:      Effort: Pulmonary effort is normal.      Breath sounds: Normal breath sounds. No wheezing.      Comments: Slightly coarse posteriorly  Abdominal:      General: Bowel sounds are normal. There is no distension.      Palpations: Abdomen is soft. Abdomen is not rigid. There is no mass.      Tenderness: There is no abdominal tenderness. There is no guarding or rebound.      Comments: Obese.  Incision is clean.  Drain removed with drain intact   Musculoskeletal:         General: Normal range of motion.      Right lower leg: No edema.      Left lower leg: No edema.   Lymphadenopathy:      Cervical: No cervical adenopathy.   Skin:     General: Skin is warm and dry.      Findings: No erythema or rash.   Neurological:      Mental Status: She is oriented to person, place, and time.   Psychiatric:         Mood and Affect: Mood normal.         Behavior: Behavior normal.         Thought Content: Thought content normal.         Judgment: Judgment normal.         Significant Labs:  I have reviewed all pertinent lab results within the past 24 hours.  CBC:   Recent Labs   Lab 07/03/23  0553   WBC 10.99  10.99   RBC 4.19  4.19   HGB 11.0*  11.0*   HCT 36.2*  36.2*     288   MCV 86  86   MCH 26.3*  26.3*   MCHC 30.4*  30.4*     BMP:   Recent Labs   Lab 07/03/23  0553   GLU 83  83     138   K 3.8  3.8     100   CO2 28  28   BUN 12  12   CREATININE 0.7  0.7   CALCIUM 8.4*  8.4*       Significant Diagnostics:  I have reviewed all pertinent imaging results/findings within the past 24 hours.  No new    Assessment/Plan:     * Incarcerated ventral hernia  S/p Open incisional hernia repair with mesh, partial omentectomy 06/20/2023   Advanced to a regular diet is full liquids was tolerated  - OOB, ambulation encouraged. Stressed the importance of getting out of bed and ambulating for postoperative recovery  -oral medications- IS 10xs/hr while awake  - PPx: SCDs, Lovenox   - Reflux medications, oral Pepcid.       if she tolerates a regular diet at home oxygen arrange the patient can be discharged provided she can obtain transportation      HARSHAD (acute kidney injury)  Treatment per hospital medicine , resolved    Aspiration pneumonia  Treatment per hospital medicine     Oral abx per medicine    Acute hypoxemic respiratory failure  Treatment per hospital medicine   Home O2 will need to be arranged    BMI 35.0-35.9,adult  Encouraged weight loss        Marcus Hutchinson MD  General Surgery  [facility] ([facility])

## 2023-07-05 NOTE — PLAN OF CARE
Pt tolerated interventions fair due to fatigue. Required CGA for bed mobility, MIN A for sit to stand from raised bed using RW, able to take 3 steps towards HOB MIN A using RW. Recommending SNF upon d/c.

## 2023-07-06 PROBLEM — R11.2 INTRACTABLE VOMITING WITH NAUSEA: Status: RESOLVED | Noted: 2023-06-30 | Resolved: 2023-07-06

## 2023-07-06 PROBLEM — J96.01 ACUTE HYPOXEMIC RESPIRATORY FAILURE: Status: RESOLVED | Noted: 2023-06-30 | Resolved: 2023-07-06

## 2023-07-06 PROBLEM — N17.9 AKI (ACUTE KIDNEY INJURY): Status: RESOLVED | Noted: 2023-07-01 | Resolved: 2023-07-06

## 2023-07-06 PROCEDURE — 27000646 HC AEROBIKA DEVICE

## 2023-07-06 PROCEDURE — 94799 UNLISTED PULMONARY SVC/PX: CPT

## 2023-07-06 PROCEDURE — 99900035 HC TECH TIME PER 15 MIN (STAT)

## 2023-07-06 PROCEDURE — 94761 N-INVAS EAR/PLS OXIMETRY MLT: CPT

## 2023-07-06 PROCEDURE — 94640 AIRWAY INHALATION TREATMENT: CPT

## 2023-07-06 PROCEDURE — 94664 DEMO&/EVAL PT USE INHALER: CPT

## 2023-07-06 PROCEDURE — 25000003 PHARM REV CODE 250: Performed by: INTERNAL MEDICINE

## 2023-07-06 PROCEDURE — 25000242 PHARM REV CODE 250 ALT 637 W/ HCPCS: Performed by: NURSE PRACTITIONER

## 2023-07-06 PROCEDURE — 25000003 PHARM REV CODE 250: Performed by: STUDENT IN AN ORGANIZED HEALTH CARE EDUCATION/TRAINING PROGRAM

## 2023-07-06 PROCEDURE — 21400001 HC TELEMETRY ROOM

## 2023-07-06 PROCEDURE — 25000003 PHARM REV CODE 250: Performed by: SURGERY

## 2023-07-06 PROCEDURE — 27000221 HC OXYGEN, UP TO 24 HOURS

## 2023-07-06 RX ORDER — HYDROCODONE BITARTRATE AND ACETAMINOPHEN 5; 325 MG/1; MG/1
1 TABLET ORAL EVERY 4 HOURS PRN
Refills: 0
Start: 2023-07-06 | End: 2023-07-06 | Stop reason: SDUPTHER

## 2023-07-06 RX ORDER — ALPRAZOLAM 0.25 MG/1
0.25 TABLET ORAL 3 TIMES DAILY
Qty: 9 TABLET | Refills: 0 | Status: SHIPPED | OUTPATIENT
Start: 2023-07-06

## 2023-07-06 RX ORDER — ONDANSETRON 4 MG/1
4 TABLET, ORALLY DISINTEGRATING ORAL EVERY 8 HOURS PRN
Status: DISCONTINUED | OUTPATIENT
Start: 2023-07-06 | End: 2023-07-07 | Stop reason: HOSPADM

## 2023-07-06 RX ORDER — HYDROCODONE BITARTRATE AND ACETAMINOPHEN 5; 325 MG/1; MG/1
1 TABLET ORAL EVERY 8 HOURS PRN
Qty: 20 TABLET | Refills: 0 | Status: SHIPPED | OUTPATIENT
Start: 2023-07-06 | End: 2023-07-31

## 2023-07-06 RX ADMIN — ALPRAZOLAM 0.25 MG: 0.25 TABLET ORAL at 03:07

## 2023-07-06 RX ADMIN — FAMOTIDINE 20 MG: 20 TABLET ORAL at 09:07

## 2023-07-06 RX ADMIN — GABAPENTIN 300 MG: 300 CAPSULE ORAL at 08:07

## 2023-07-06 RX ADMIN — AMOXICILLIN AND CLAVULANATE POTASSIUM 1 TABLET: 875; 125 TABLET, FILM COATED ORAL at 09:07

## 2023-07-06 RX ADMIN — MONTELUKAST 10 MG: 10 TABLET, FILM COATED ORAL at 09:07

## 2023-07-06 RX ADMIN — IPRATROPIUM BROMIDE AND ALBUTEROL SULFATE 3 ML: 2.5; .5 SOLUTION RESPIRATORY (INHALATION) at 07:07

## 2023-07-06 RX ADMIN — HYDROCODONE BITARTRATE AND ACETAMINOPHEN 1 TABLET: 5; 325 TABLET ORAL at 11:07

## 2023-07-06 RX ADMIN — MELOXICAM 15 MG: 7.5 TABLET ORAL at 08:07

## 2023-07-06 RX ADMIN — FLUOXETINE 40 MG: 20 CAPSULE ORAL at 08:07

## 2023-07-06 RX ADMIN — GABAPENTIN 300 MG: 300 CAPSULE ORAL at 03:07

## 2023-07-06 RX ADMIN — GABAPENTIN 300 MG: 300 CAPSULE ORAL at 09:07

## 2023-07-06 RX ADMIN — GUAIFENESIN 1200 MG: 600 TABLET, EXTENDED RELEASE ORAL at 09:07

## 2023-07-06 RX ADMIN — AMOXICILLIN AND CLAVULANATE POTASSIUM 1 TABLET: 875; 125 TABLET, FILM COATED ORAL at 08:07

## 2023-07-06 RX ADMIN — FAMOTIDINE 20 MG: 20 TABLET ORAL at 08:07

## 2023-07-06 RX ADMIN — IPRATROPIUM BROMIDE AND ALBUTEROL SULFATE 3 ML: 2.5; .5 SOLUTION RESPIRATORY (INHALATION) at 12:07

## 2023-07-06 RX ADMIN — FLUTICASONE PROPIONATE 100 MCG: 50 SPRAY, METERED NASAL at 08:07

## 2023-07-06 RX ADMIN — HYDROCODONE BITARTRATE AND ACETAMINOPHEN 1 TABLET: 5; 325 TABLET ORAL at 09:07

## 2023-07-06 RX ADMIN — SUCRALFATE 1 G: 1 SUSPENSION ORAL at 05:07

## 2023-07-06 RX ADMIN — ALPRAZOLAM 0.25 MG: 0.25 TABLET ORAL at 08:07

## 2023-07-06 RX ADMIN — ALPRAZOLAM 0.25 MG: 0.25 TABLET ORAL at 09:07

## 2023-07-06 RX ADMIN — GUAIFENESIN 1200 MG: 600 TABLET, EXTENDED RELEASE ORAL at 08:07

## 2023-07-06 RX ADMIN — HYDROCODONE BITARTRATE AND ACETAMINOPHEN 1 TABLET: 7.5; 325 TABLET ORAL at 12:07

## 2023-07-06 RX ADMIN — ALUMINUM HYDROXIDE, MAGNESIUM HYDROXIDE, AND DIMETHICONE 30 ML: 200; 20; 200 SUSPENSION ORAL at 05:07

## 2023-07-06 NOTE — DISCHARGE SUMMARY
[facility])  General Surgery  Discharge Summary      Patient Name: Hamzah Nicolas  MRN: [id number]  Admission Date: 6/28/2023  Hospital Length of Stay: 9 days  Discharge Date and Time:  ,07/07/2023   Attending Physician: Marcus Hutchinson MD   Discharging Provider: Marcus Hutchinson MD  Primary Care Provider: Lali Hatch MD    HPI:   58-year-old female with a 5 day history of bulging at the umbilicus.  The area has become red and tender.  The pain increased and she presented to the emergency room.  She had associated nausea and vomiting She has not had anything to eat and drink in a few days.  She denies any fever or chills.  She is not on any anticoagulation medicines.      Patient was evaluated in the emergency room and a urgent surgical consult was obtained.      Procedure(s) (LRB):  REPAIR, HERNIA, UMBILICAL, INCARCERATED, AGE 4 YEARS OR OLDER (N/A)  OMENTECTOMY (N/A)      Indwelling Lines/Drains at time of discharge:   Lines/Drains/Airways       None                 Hospital Course: 06/29/2023.  Postop day 1.  Incisional pain.  Nausea resolved.  Tolerated liquids.  Adjust pain medicines.  Advance diet.  Watkins catheter removed    06/30/2023.  Postop day 2.  Incisional pain.  Multiple episodes of nausea and vomiting.  Heartburn, reflux, upset emotionally    07/01/2023: POD 3. Feels slightly better today. Minor nausea but denies emesis today.     07/02/2023: POD 4. No further nausea or vomiting. No BM/flatus. Poor ambulation.     7/3/23:  anxiety about not being able to eat, no nausea and vomiting, bowel movement    07/04/2023.  Postop day 6.  Feeling better.  Tolerated full liquids.  Will need home oxygen.  Regular diet and if tolerated discharge home as long as patient can get transportation home oxygen can be arranged    07/05/2023.  Postop day 7.  Feeling better.  Tolerating a regular diet.  Physical therapy feels that skilled nursing is needed as she has limitations in mobility.   Patient has agreed to go to a skilled nursing facility.  This needs to be arranged by case management    07/06/2023.  Postop day 8.  Mainly is concerned about having multiple bowel movements.  Pain is controlled tolerating a diet.  Has been accepted at a skilled nursing unit, can be discharged with insurance approval    07/07/2023.  Postop day 9.  Having loose bowel movements.  Other than that no complaints.  Emotionally labile.  Accepted to skilled nursing.  Has not been transferred yet.  Informed her that the loose stool was likely from the Augmentin and should resolve over time.      Goals of Care Treatment Preferences:  Code Status: Full Code      Consults:   Consults (From admission, onward)          Status Ordering Provider     Inpatient consult to Social Work/Case Management  Once        Provider:  (Not yet assigned)    Completed LASHAE JIMENEZ     Inpatient consult to Hospitalist  Once        Provider:  (Not yet assigned)    Acknowledged LASHAE JIMENEZ            Significant Diagnostic Studies: Labs: BMP: No results for input(s): GLU, NA, K, CL, CO2, BUN, CREATININE, CALCIUM, MG in the last 48 hours., CMP No results for input(s): NA, K, CL, CO2, GLU, BUN, CREATININE, CALCIUM, PROT, ALBUMIN, BILITOT, ALKPHOS, AST, ALT, ANIONGAP, ESTGFRAFRICA, EGFRNONAA in the last 48 hours. and CBC No results for input(s): WBC, HGB, HCT, PLT in the last 48 hours.  Radiology: CT scan: CT ABDOMEN PELVIS WITH CONTRAST: No results found for this visit on 06/28/23. and CT ABDOMEN PELVIS WITHOUT CONTRAST: No results found for this visit on 06/28/23.      Reading Physician Reading Date Result Priority   Jorge Jung MD  [phone] 6/30/2023 Routine     Narrative & Impression  EXAMINATION:  CTA CHEST NON CORONARY (PE STUDIES)     CLINICAL HISTORY:  Positive D-dimer     TECHNIQUE:  After the intravenous administration of 100 cc of Omni 350 nonionic contrast using CT pulmonary angio technique, 1.25  mm axial  images were acquired using helical CT technique from the lung apices through costophrenic sulci.  Axial mips, sagittal and coronal reformats were also submitted for interpretation.     COMPARISON:  Chest x-ray dated 06/30/2023     FINDINGS:  Study is limited by body habitus.     -Pulmonary arteries: Pulmonary arteries are well opacified.  No evidence of pulmonary embolism.  No evidence of pulmonary hypertension.  No right heart strain is identified.     -Lungs: Patchy infiltrates are seen within the lower lobes concerning for aspiration or airspace disease or edema proces aspiration.  Atelectasis noted along the right major fissure.     -Pleura: No thickening or fluid.     -Mediastinum/Tabitha:No significant adenopathy     -Axilla: No adenopathy.     -Thyroid: Normal lower gland.     -Heart/Aorta: Heart size is normal.  Mild coronary artery disease.  No pericardial effusion. Aorta normal caliber.  Mild atherosclerotic disease of the aorta.  Tortuosity of the descending aorta can be seen with chronic hypertension.     -Bones/Chest Wall: Intact     -Upper Abdomen: Hepatomegaly with decreased attenuation consistent with hepatic steatosis.  Moderate gastric distension.     Impression:     No evidence of pulmonary embolism.     Patchy infiltrates are seen within the lower lobes concerning for aspiration or airspace disease or edema process aspiration.     See additional findings above     All CT scans at this facility use dose modulation, iterative reconstruction and/or weight based dosing when appropriate to reduce radiation dose to as low as reasonably achievable.        Electronically signed by: Jorge Jung MD  Date:                                            06/30/2023  Time:                                           13:29    Pending Diagnostic Studies:       None          Final Active Diagnoses:    Diagnosis Date Noted POA    PRINCIPAL PROBLEM:  Incarcerated ventral hernia [K43.6] 06/28/2023 Yes    Aspiration  "pneumonia [J69.0] 07/01/2023 Yes    HARSHAD (acute kidney injury) [N17.9] 07/01/2023 No    Acute hypoxemic respiratory failure [J96.01] 06/30/2023 Yes    Intractable vomiting with nausea [R11.2] 06/30/2023 Yes    BMI 35.0-35.9,adult [Z68.35] 06/28/2023 Not Applicable      Problems Resolved During this Admission:      Discharged Condition: stable    Disposition: Home or Self Care    Follow Up:   Contact information for follow-up providers       Marcus Hutchinson MD Follow up in 3 week(s).    Specialty: General Surgery  Why: post op appt    The office will contact the patient for a follow-up appointment  Contact information:  [address]  [place] [address] [phone]                       Contact information for after-discharge care       Durable Medical Equipment       [facility] .    Service: Durable Medical Equipment  Contact information:  [address] [place] [address] [id number] [phone]                                 Patient Instructions:      OXYGEN FOR HOME USE     Order Specific Question Answer Comments   Liter Flow 2    Duration Continuous    Qualifying Test Performed at: [facility]   Oxygen saturation: 2    Portable mode: continuous    Route nasal cannula    Device: home concentrator with portable tanks Whenever can supplied by the   Length of need (in months): 3 mos Six months   Patient condition with qualifying saturation COPD Aspiration pneumonia   Height: 5' 3" (1.6 m)    Weight: 109 kg (240 lb 4.8 oz)    Alternative treatment measures have been tried or considered and deemed clinically ineffective. Yes      OXYGEN FOR HOME USE     Order Specific Question Answer Comments   Liter Flow 3    Duration Continuous    Qualifying Test Performed at: [facility]    Oxygen saturation: 81    Portable mode: continuous    Route nasal cannula    Device: home concentrator with portable tanks Home tanks   Length of need (in months): 3 mos    Patient condition with qualifying saturation " "Other - List qualifying diagnosis and code    Select a diagnosis & list the code in the comments Aspiration pneumonia [095004]    Height: 5' 3" (1.6 m)    Weight: 110.1 kg (242 lb 11.6 oz)    Alternative treatment measures have been tried or considered and deemed clinically ineffective. Yes      Diet Adult Regular     Lifting restrictions   Order Comments: No lifting more than 20 lb for will weeks     No driving until:   Order Comments: If taking pain medications     Notify your health care provider if you experience any of the following:  temperature >100.4     Notify your health care provider if you experience any of the following:  persistent nausea and vomiting or diarrhea     Notify your health care provider if you experience any of the following:  severe uncontrolled pain     Notify your health care provider if you experience any of the following:  redness, tenderness, or signs of infection (pain, swelling, redness, odor or green/yellow discharge around incision site)     No dressing needed     Change dressing (specify)   Order Comments: Dressing change:  Antibiotic ointment and a Band-Aid to the drain site daily for 10 days     Lifting restrictions   Order Comments: No lifting more than 20 lb 4 weeks     Notify your health care provider if you experience any of the following:  temperature >100.4     Notify your health care provider if you experience any of the following:  persistent nausea and vomiting or diarrhea     Notify your health care provider if you experience any of the following:  severe uncontrolled pain     Notify your health care provider if you experience any of the following:  redness, tenderness, or signs of infection (pain, swelling, redness, odor or green/yellow discharge around incision site)     Change dressing (specify)   Order Comments: Dressing change:  Dry gauze to open area of midline incision daily.     Antibiotic ointment and a balloon due to left after all wall drain site daily for " 10 days.     Shower on day dressing removed (No bath)     Medications:  Reconciled Home Medications:      Medication List        START taking these medications      amoxicillin-clavulanate 875-125mg 875-125 mg per tablet  Commonly known as: AUGMENTIN  Take 1 tablet by mouth every 12 (twelve) hours. for 10 days     HYDROcodone-acetaminophen 5-325 mg per tablet  Commonly known as: NORCO  Take 1 tablet by mouth every 8 (eight) hours as needed for Pain.     ipratropium-albuteroL  mcg/actuation inhaler  Commonly known as: CombiVENT  Inhale 1 puff into the lungs 4 (four) times daily. Rescue     oxyCODONE-acetaminophen 7.5-325 mg per tablet  Commonly known as: PERCOCET  Take 1 tablet by mouth every 4 (four) hours as needed for Pain.            CONTINUE taking these medications      ALPRAZolam 0.25 MG tablet  Commonly known as: XANAX  Take 1 tablet (0.25 mg total) by mouth 3 (three) times daily.     cetirizine 10 MG tablet  Commonly known as: ZYRTEC  Take 10 mg by mouth once daily.     diclofenac 50 MG EC tablet  Commonly known as: VOLTAREN  Take 1 tablet (50 mg total) by mouth 2 (two) times daily as needed.     MOBIC ORAL  Take by mouth.     montelukast 10 mg tablet  Commonly known as: SINGULAIR  Take 10 mg by mouth every evening.     PROZAC ORAL  Take 60 mg by mouth once daily.     ZANAFLEX ORAL  Take by mouth.            Time spent on the discharge of patient: 7 minutes    Marcus Hutchinson MD  General Surgery  [facility] ([facility])

## 2023-07-06 NOTE — PROGRESS NOTES
[facility]  General Surgery  Progress Note    Subjective:     History of Present Illness:  58-year-old female with a 5 day history of bulging at the umbilicus.  The area has become red and tender.  The pain increased and she presented to the emergency room.  She had associated nausea and vomiting She has not had anything to eat and drink in a few days.  She denies any fever or chills.  She is not on any anticoagulation medicines.      Patient was evaluated in the emergency room and a urgent surgical consult was obtained.      Post-Op Info:  Procedure(s) (LRB):  REPAIR, HERNIA, UMBILICAL, INCARCERATED, AGE 4 YEARS OR OLDER (N/A)  OMENTECTOMY (N/A)   8 Days Post-Op     Interval History:  Tolerating a diet, bowel movements small area of the open and serous drainage.  Patient discharged to skilled nursing unit when accepted    Medications:  Continuous Infusions:  Scheduled Meds:   albuterol-ipratropium  3 mL Nebulization Q6H    ALPRAZolam  0.25 mg Oral TID    aluminum-magnesium hydroxide-simethicone  30 mL Oral QID (AC & HS)    amoxicillin-clavulanate 875-125mg  1 tablet Oral Q12H    famotidine  20 mg Oral BID    FLUoxetine  40 mg Oral Daily    fluticasone propionate  2 spray Each Nostril Daily    gabapentin  300 mg Oral TID    guaiFENesin  1,200 mg Oral BID    meloxicam  15 mg Oral Daily    montelukast  10 mg Oral QHS    sucralfate  1 g Oral Q6H     PRN Meds:aluminum-magnesium hydroxide-simethicone, diphenhydrAMINE, HYDROcodone-acetaminophen, HYDROcodone-acetaminophen, HYDROmorphone, metoclopramide HCl, ondansetron     Review of patient's allergies indicates:   Allergen Reactions    Opioids - morphine analogues      Objective:     Vital Signs (Most Recent):  Temp: 98.2 °F (36.8 °C) (07/06/23 0714)  Pulse: 102 (07/06/23 0729)  Resp: 20 (07/06/23 0729)  BP: 125/70 (07/06/23 0714)  SpO2: (!) 94 % (07/06/23 0729) Vital Signs (24h Range):  Temp:  [97 °F (36.1 °C)-98.8 °F (37.1 °C)] 98.2 °F  (36.8 °C)  Pulse:  [] 102  Resp:  [16-20] 20  SpO2:  [91 %-96 %] 94 %  BP: (115-146)/(64-85) 125/70     Weight: 110.1 kg (242 lb 11.6 oz)  Body mass index is 43 kg/m².    Intake/Output - Last 3 Shifts         07/04 0700  07/05 0659 07/05 0700  07/06 0659 07/06 0700  07/07 0659    P.O. 720 720     I.V. (mL/kg) 0 (0)      Total Intake(mL/kg) 720 (6.6) 720 (6.7)     Urine (mL/kg/hr) 150 (0.1)      Drains 5      Stool       Total Output 155      Net +565 +720            Urine Occurrence 3 x 2 x     Stool Occurrence 1 x 3 x              Physical Exam  Vitals and nursing note reviewed.   Constitutional:       Appearance: She is well-developed. She is obese. Ill appearance: Mild chronic.   HENT:      Head: Normocephalic.   Eyes:      Pupils: Pupils are equal, round, and reactive to light.   Neck:      Thyroid: No thyromegaly.      Vascular: No JVD.      Trachea: No tracheal deviation.   Cardiovascular:      Rate and Rhythm: Normal rate and regular rhythm.      Pulses: Normal pulses.      Heart sounds: Normal heart sounds.   Pulmonary:      Breath sounds: Normal breath sounds. No wheezing.   Abdominal:      General: Bowel sounds are normal. There is no distension.      Palpations: Abdomen is soft. Abdomen is not rigid. There is no mass.      Tenderness: There is no abdominal tenderness. There is no guarding or rebound.      Comments: Significantly obese  Drain site clean.    Small amount erythema midline incision.  Sutures cut,  Small amount serous drainage.  Dressing placed   Musculoskeletal:         General: Normal range of motion.      Right lower leg: No edema.      Left lower leg: No edema.   Lymphadenopathy:      Cervical: No cervical adenopathy.   Skin:     General: Skin is warm and dry.      Findings: No erythema or rash.   Neurological:      Mental Status: She is alert and oriented to person, place, and time.   Psychiatric:         Mood and Affect: Mood normal.         Behavior: Behavior normal.          Thought Content: Thought content normal.         Judgment: Judgment normal.        Significant Labs:  I have reviewed all pertinent lab results within the past 24 hours.  CBC:   Recent Labs   Lab 07/03/23  0553   WBC 10.99  10.99   RBC 4.19  4.19   HGB 11.0*  11.0*   HCT 36.2*  36.2*     288   MCV 86  86   MCH 26.3*  26.3*   MCHC 30.4*  30.4*     BMP:   Recent Labs   Lab 07/03/23  0553   GLU 83  83     138   K 3.8  3.8     100   CO2 28  28   BUN 12  12   CREATININE 0.7  0.7   CALCIUM 8.4*  8.4*       Significant Diagnostics:  I have reviewed all pertinent imaging results/findings within the past 24 hours.  None    Assessment/Plan:     * Incarcerated ventral hernia  S/p Open incisional hernia repair with mesh, partial omentectomy 06/20/2023   Regular diet   Supplemental oxygen   Accepted to skilled nursing, discharge pending insurance.      Small seroma of the wound    Will need follow up with surgery in 3    HARSHAD (acute kidney injury)  Treatment per hospital medicine , resolved    Aspiration pneumonia  Treatment per hospital medicine     Oral abx per medicine    Intractable vomiting with nausea  Resolved    Acute hypoxemic respiratory failure  Treatment per hospital medicine , improving  Future need home oxygen to be determined by the skilled nursing.  Will need discharged on supplemental oxygen    BMI 35.0-35.9,adult  Encouraged weight loss        Marcus Hutchinson MD  General Surgery  [facility] ([facility])

## 2023-07-06 NOTE — ASSESSMENT & PLAN NOTE
S/p Open incisional hernia repair with mesh, partial omentectomy 06/20/2023   Regular diet   Supplemental oxygen   Accepted to skilled nursing, discharge pending insurance.      Small seroma of the wound    Will need follow up with surgery in 3

## 2023-07-06 NOTE — ASSESSMENT & PLAN NOTE
Treatment per hospital medicine , improving  Future need home oxygen to be determined by the skilled nursing.  Will need discharged on supplemental oxygen

## 2023-07-06 NOTE — SUBJECTIVE & OBJECTIVE
Interval History:  Tolerating a diet, bowel movements small area of the open and serous drainage.  Patient discharged to skilled nursing unit when accepted    Medications:  Continuous Infusions:  Scheduled Meds:   albuterol-ipratropium  3 mL Nebulization Q6H    ALPRAZolam  0.25 mg Oral TID    aluminum-magnesium hydroxide-simethicone  30 mL Oral QID (AC & HS)    amoxicillin-clavulanate 875-125mg  1 tablet Oral Q12H    famotidine  20 mg Oral BID    FLUoxetine  40 mg Oral Daily    fluticasone propionate  2 spray Each Nostril Daily    gabapentin  300 mg Oral TID    guaiFENesin  1,200 mg Oral BID    meloxicam  15 mg Oral Daily    montelukast  10 mg Oral QHS    sucralfate  1 g Oral Q6H     PRN Meds:aluminum-magnesium hydroxide-simethicone, diphenhydrAMINE, HYDROcodone-acetaminophen, HYDROcodone-acetaminophen, HYDROmorphone, metoclopramide HCl, ondansetron     Review of patient's allergies indicates:   Allergen Reactions    Opioids - morphine analogues      Objective:     Vital Signs (Most Recent):  Temp: 98.2 °F (36.8 °C) (07/06/23 0714)  Pulse: 102 (07/06/23 0729)  Resp: 20 (07/06/23 0729)  BP: 125/70 (07/06/23 0714)  SpO2: (!) 94 % (07/06/23 0729) Vital Signs (24h Range):  Temp:  [97 °F (36.1 °C)-98.8 °F (37.1 °C)] 98.2 °F (36.8 °C)  Pulse:  [] 102  Resp:  [16-20] 20  SpO2:  [91 %-96 %] 94 %  BP: (115-146)/(64-85) 125/70     Weight: 110.1 kg (242 lb 11.6 oz)  Body mass index is 43 kg/m².    Intake/Output - Last 3 Shifts         07/04 0700 07/05 0659 07/05 0700 07/06 0659 07/06 0700 07/07 0659    P.O. 720 720     I.V. (mL/kg) 0 (0)      Total Intake(mL/kg) 720 (6.6) 720 (6.7)     Urine (mL/kg/hr) 150 (0.1)      Drains 5      Stool       Total Output 155      Net +565 +720            Urine Occurrence 3 x 2 x     Stool Occurrence 1 x 3 x              Physical Exam  Vitals and nursing note reviewed.   Constitutional:       Appearance: She is well-developed. She is obese. Ill appearance: Mild chronic.   HENT:       Head: Normocephalic.   Eyes:      Pupils: Pupils are equal, round, and reactive to light.   Neck:      Thyroid: No thyromegaly.      Vascular: No JVD.      Trachea: No tracheal deviation.   Cardiovascular:      Rate and Rhythm: Normal rate and regular rhythm.      Pulses: Normal pulses.      Heart sounds: Normal heart sounds.   Pulmonary:      Breath sounds: Normal breath sounds. No wheezing.   Abdominal:      General: Bowel sounds are normal. There is no distension.      Palpations: Abdomen is soft. Abdomen is not rigid. There is no mass.      Tenderness: There is no abdominal tenderness. There is no guarding or rebound.      Comments: Significantly obese  Drain site clean.    Small amount erythema midline incision.  Sutures cut,  Small amount serous drainage.  Dressing placed   Musculoskeletal:         General: Normal range of motion.      Right lower leg: No edema.      Left lower leg: No edema.   Lymphadenopathy:      Cervical: No cervical adenopathy.   Skin:     General: Skin is warm and dry.      Findings: No erythema or rash.   Neurological:      Mental Status: She is alert and oriented to person, place, and time.   Psychiatric:         Mood and Affect: Mood normal.         Behavior: Behavior normal.         Thought Content: Thought content normal.         Judgment: Judgment normal.        Significant Labs:  I have reviewed all pertinent lab results within the past 24 hours.  CBC:   Recent Labs   Lab 07/03/23  0553   WBC 10.99  10.99   RBC 4.19  4.19   HGB 11.0*  11.0*   HCT 36.2*  36.2*     288   MCV 86  86   MCH 26.3*  26.3*   MCHC 30.4*  30.4*     BMP:   Recent Labs   Lab 07/03/23  0553   GLU 83  83     138   K 3.8  3.8     100   CO2 28  28   BUN 12  12   CREATININE 0.7  0.7   CALCIUM 8.4*  8.4*       Significant Diagnostics:  I have reviewed all pertinent imaging results/findings within the past 24 hours.  None

## 2023-07-06 NOTE — PLAN OF CARE
"Received authorization to go to [facility].  Awaiting 142.    Discharge orders sent via [employer].       07/06/23 1008   Post-Acute Status   Post-Acute Authorization Placement   Post-Acute Placement Status Set-up Complete/Auth obtained     12:05pm Received 142 and [facility] to [facility]  Awaiting number to call report and transportation time.    1:30pm Staff at [facility] attempted to obtain financial information and start admission process.  Patient became frustrated and told staff she did not want to come.  When NANCY asked the patient, she stated "I did not say that".  I only told him I wasn't giving him all the financial information."  Patient states she has no income and lives off of food stamp.  Advised Mikey with [facility].      Mikey states they have all they need to admit.  They just need to verify that the patient has a place to go after discharge.  Patient stated she could go to her mother's but then she said that her mother could not take care of her anymore.  Mikey would like to speak to the mom to verify that she will accept the patient at discharge as the patient does not meet criteria for long term placement.  NANCY left a message for mother to call. Mikey will do the same.    2:20pm Attempted to call the mother again.  No answer.  "

## 2023-07-06 NOTE — PLAN OF CARE
A230/A230 ZAINAB Nicolas is a 58 y.o.female admitted on 6/28/2023 for Incarcerated ventral hernia   Code Status: Full Code MRN: [id number]   Review of patient's allergies indicates:   Allergen Reactions    Opioids - morphine analogues      Past Medical History:   Diagnosis Date    Anxiety     Back pain     Depression       PRN meds    aluminum-magnesium hydroxide-simethicone, 30 mL, Q6H PRN  diphenhydrAMINE, 25 mg, Q6H PRN  HYDROcodone-acetaminophen, 1 tablet, Q4H PRN  HYDROcodone-acetaminophen, 1 tablet, Q6H PRN  HYDROmorphone, 1 mg, Q4H PRN  metoclopramide HCl, 5 mg, Q6H PRN  ondansetron, 8 mg, Q8H PRN      No falls reported on shift and hourly rounding is complete.      Orientation: oriented x 4        Lead Monitored: Lead II Rhythm: sinus tachycardia    Cardiac/Telemetry Box Number: [id number]  VTE Required Core Measure: Pharmacological prophylaxis initiated/maintained Last Bowel Movement: 07/06/23  Diet Adult Regular (IDDSI Level 7)  Diet Adult Regular  Voiding Characteristics: external catheter  Kaushik Score: 19  Fall Risk Score: 10  Accucheck []   Freq?      Lines/Drains/Airways       Peripheral Intravenous Line  Duration                  Peripheral IV - Single Lumen 07/03/23 1634 20 G Anterior;Left Forearm 2 days

## 2023-07-07 VITALS
BODY MASS INDEX: 43.01 KG/M2 | HEART RATE: 77 BPM | RESPIRATION RATE: 20 BRPM | WEIGHT: 242.75 LBS | TEMPERATURE: 99 F | HEIGHT: 63 IN | OXYGEN SATURATION: 96 % | SYSTOLIC BLOOD PRESSURE: 134 MMHG | DIASTOLIC BLOOD PRESSURE: 69 MMHG

## 2023-07-07 PROCEDURE — 25000003 PHARM REV CODE 250: Performed by: INTERNAL MEDICINE

## 2023-07-07 PROCEDURE — 94640 AIRWAY INHALATION TREATMENT: CPT

## 2023-07-07 PROCEDURE — 25000003 PHARM REV CODE 250: Performed by: SURGERY

## 2023-07-07 PROCEDURE — 94761 N-INVAS EAR/PLS OXIMETRY MLT: CPT

## 2023-07-07 PROCEDURE — 99900035 HC TECH TIME PER 15 MIN (STAT)

## 2023-07-07 PROCEDURE — 25000242 PHARM REV CODE 250 ALT 637 W/ HCPCS: Performed by: NURSE PRACTITIONER

## 2023-07-07 PROCEDURE — 27000221 HC OXYGEN, UP TO 24 HOURS

## 2023-07-07 PROCEDURE — 25000003 PHARM REV CODE 250: Performed by: STUDENT IN AN ORGANIZED HEALTH CARE EDUCATION/TRAINING PROGRAM

## 2023-07-07 RX ADMIN — IPRATROPIUM BROMIDE AND ALBUTEROL SULFATE 3 ML: 2.5; .5 SOLUTION RESPIRATORY (INHALATION) at 02:07

## 2023-07-07 RX ADMIN — ALPRAZOLAM 0.25 MG: 0.25 TABLET ORAL at 05:07

## 2023-07-07 RX ADMIN — IPRATROPIUM BROMIDE AND ALBUTEROL SULFATE 3 ML: 2.5; .5 SOLUTION RESPIRATORY (INHALATION) at 08:07

## 2023-07-07 RX ADMIN — HYDROCODONE BITARTRATE AND ACETAMINOPHEN 1 TABLET: 7.5; 325 TABLET ORAL at 05:07

## 2023-07-07 RX ADMIN — MELOXICAM 15 MG: 7.5 TABLET ORAL at 09:07

## 2023-07-07 RX ADMIN — ALPRAZOLAM 0.25 MG: 0.25 TABLET ORAL at 09:07

## 2023-07-07 RX ADMIN — GUAIFENESIN 1200 MG: 600 TABLET, EXTENDED RELEASE ORAL at 09:07

## 2023-07-07 RX ADMIN — HYDROCODONE BITARTRATE AND ACETAMINOPHEN 1 TABLET: 7.5; 325 TABLET ORAL at 09:07

## 2023-07-07 RX ADMIN — FAMOTIDINE 20 MG: 20 TABLET ORAL at 09:07

## 2023-07-07 RX ADMIN — IPRATROPIUM BROMIDE AND ALBUTEROL SULFATE 3 ML: 2.5; .5 SOLUTION RESPIRATORY (INHALATION) at 01:07

## 2023-07-07 RX ADMIN — SUCRALFATE 1 G: 1 SUSPENSION ORAL at 12:07

## 2023-07-07 RX ADMIN — GABAPENTIN 300 MG: 300 CAPSULE ORAL at 09:07

## 2023-07-07 RX ADMIN — AMOXICILLIN AND CLAVULANATE POTASSIUM 1 TABLET: 875; 125 TABLET, FILM COATED ORAL at 09:07

## 2023-07-07 RX ADMIN — GABAPENTIN 300 MG: 300 CAPSULE ORAL at 05:07

## 2023-07-07 RX ADMIN — DIPHENHYDRAMINE HYDROCHLORIDE 25 MG: 25 CAPSULE ORAL at 09:07

## 2023-07-07 RX ADMIN — FLUOXETINE 40 MG: 20 CAPSULE ORAL at 09:07

## 2023-07-07 NOTE — ASSESSMENT & PLAN NOTE
S/p Open incisional hernia repair with mesh, partial omentectomy 06/20/2023   Regular diet   Supplemental oxygen is required.      Loose stool likely from the omentum and should resolve once it is discontinued.    Accepted to skilled nursing, awaiting transfer    Small seroma of the wound    Will need follow up with surgery in 3 weeks

## 2023-07-07 NOTE — PLAN OF CARE
Nutrition Recommendations 7/7:  1. Recommend pt continue on Regular diet as medically appropriate   2. Reommend Joey BID x 2 weeks for wound healing   3. Recommend Banatrol (Banana flakes) BID to assist with loose stools   4. Encourage supplement intake   5. Weekly weights  6. Collaboration of care with medical providers     Goals:   1. Pt will continue to tolerate and consume >75% est EEN/EPN by RD follow up   2. Pt will consume Joey BID prior to RD follow up   3. Pt loose stools will improve by RD follow up    Lacie Haynes, Registration Eligible, Provisional LDN

## 2023-07-07 NOTE — SUBJECTIVE & OBJECTIVE
Interval History:  Upset about having loose stool.  I told her this would resolve when she stops the antibiotics.  She is been accepted to skilled nursing awaiting transfer.  Still has oxygen requirements    Medications:  Continuous Infusions:  Scheduled Meds:   albuterol-ipratropium  3 mL Nebulization Q6H    ALPRAZolam  0.25 mg Oral TID    amoxicillin-clavulanate 875-125mg  1 tablet Oral Q12H    famotidine  20 mg Oral BID    FLUoxetine  40 mg Oral Daily    fluticasone propionate  2 spray Each Nostril Daily    gabapentin  300 mg Oral TID    guaiFENesin  1,200 mg Oral BID    meloxicam  15 mg Oral Daily    montelukast  10 mg Oral QHS    sucralfate  1 g Oral Q6H     PRN Meds:aluminum-magnesium hydroxide-simethicone, diphenhydrAMINE, HYDROcodone-acetaminophen, HYDROcodone-acetaminophen, ondansetron     Review of patient's allergies indicates:   Allergen Reactions    Opioids - morphine analogues      Objective:     Vital Signs (Most Recent):  Temp: 99 °F (37.2 °C) (07/07/23 0749)  Pulse: 80 (07/07/23 0852)  Resp: 18 (07/07/23 0852)  BP: 125/72 (07/07/23 0749)  SpO2: (!) 92 % (07/07/23 0852) Vital Signs (24h Range):  Temp:  [97.4 °F (36.3 °C)-99.2 °F (37.3 °C)] 99 °F (37.2 °C)  Pulse:  [] 80  Resp:  [15-20] 18  SpO2:  [92 %-97 %] 92 %  BP: (104-134)/(61-72) 125/72     Weight: 110.1 kg (242 lb 11.6 oz)  Body mass index is 43 kg/m².    Intake/Output - Last 3 Shifts         07/05 0700 07/06 0659 07/06 0700 07/07 0659 07/07 0700 07/08 0659    P.O. 720      I.V. (mL/kg)       Total Intake(mL/kg) 720 (6.7)      Urine (mL/kg/hr)       Drains       Total Output       Net +720             Urine Occurrence 2 x      Stool Occurrence 3 x               Physical Exam  Vitals reviewed.   Constitutional:       Appearance: She is well-developed. She is obese.   HENT:      Head: Normocephalic.   Eyes:      Pupils: Pupils are equal, round, and reactive to light.   Neck:      Thyroid: No thyromegaly.      Vascular: No JVD.       Trachea: No tracheal deviation.   Cardiovascular:      Rate and Rhythm: Normal rate and regular rhythm.      Heart sounds: Normal heart sounds.   Pulmonary:      Breath sounds: Normal breath sounds. No wheezing.   Abdominal:      General: Bowel sounds are normal. There is no distension.      Palpations: Abdomen is soft. Abdomen is not rigid. There is no mass.      Tenderness: There is no abdominal tenderness (Minimal incision). There is no guarding or rebound.      Comments: Obese.  Mild erythema around the open edge of the incision.  No drainage.  Wound is clean.   Musculoskeletal:         General: Normal range of motion.      Right lower leg: No edema.      Left lower leg: No edema.   Lymphadenopathy:      Cervical: No cervical adenopathy.   Skin:     General: Skin is warm and dry.      Capillary Refill: Capillary refill takes less than 2 seconds.      Findings: No erythema or rash.   Neurological:      Mental Status: She is alert and oriented to person, place, and time.   Psychiatric:         Mood and Affect: Mood normal.         Behavior: Behavior normal.         Thought Content: Thought content normal.         Judgment: Judgment normal.        Significant Labs:  I have reviewed all pertinent lab results within the past 24 hours.  CBC:   Recent Labs   Lab 07/03/23  0553   WBC 10.99  10.99   RBC 4.19  4.19   HGB 11.0*  11.0*   HCT 36.2*  36.2*     288   MCV 86  86   MCH 26.3*  26.3*   MCHC 30.4*  30.4*     BMP:   Recent Labs   Lab 07/03/23  0553   GLU 83  83     138   K 3.8  3.8     100   CO2 28  28   BUN 12  12   CREATININE 0.7  0.7   CALCIUM 8.4*  8.4*       Significant Diagnostics:  I have reviewed all pertinent imaging results/findings within the past 24 hours.  No new

## 2023-07-07 NOTE — PLAN OF CARE
Patient will be going today to [facility] today.  Covid test ordered Please call report to [phone].  Will get transportation time after covid resulted.    [facility] decided it did not need a covid screening.  Order cancelled.       07/07/23 1221   Post-Acute Status   Post-Acute Authorization Placement   Post-Acute Placement Status Set-up Complete/Auth obtained   Discharge Plan   Discharge Plan A Skilled Nursing Facility

## 2023-07-07 NOTE — PROGRESS NOTES
[facility]  General Surgery  Progress Note    Subjective:     History of Present Illness:  58-year-old female with a 5 day history of bulging at the umbilicus.  The area has become red and tender.  The pain increased and she presented to the emergency room.  She had associated nausea and vomiting She has not had anything to eat and drink in a few days.  She denies any fever or chills.  She is not on any anticoagulation medicines.      Patient was evaluated in the emergency room and a urgent surgical consult was obtained.      Post-Op Info:  Procedure(s) (LRB):  REPAIR, HERNIA, UMBILICAL, INCARCERATED, AGE 5 YEARS OR OLDER (N/A)  OMENTECTOMY (N/A)   9 Days Post-Op     Interval History:  Upset about having loose stool.  I told her this would resolve when she stops the antibiotics.  She is been accepted to skilled nursing awaiting transfer.  Still has oxygen requirements    Medications:  Continuous Infusions:  Scheduled Meds:   albuterol-ipratropium  3 mL Nebulization Q6H    ALPRAZolam  0.25 mg Oral TID    amoxicillin-clavulanate 875-125mg  1 tablet Oral Q12H    famotidine  20 mg Oral BID    FLUoxetine  40 mg Oral Daily    fluticasone propionate  2 spray Each Nostril Daily    gabapentin  300 mg Oral TID    guaiFENesin  1,200 mg Oral BID    meloxicam  15 mg Oral Daily    montelukast  10 mg Oral QHS    sucralfate  1 g Oral Q6H     PRN Meds:aluminum-magnesium hydroxide-simethicone, diphenhydrAMINE, HYDROcodone-acetaminophen, HYDROcodone-acetaminophen, ondansetron     Review of patient's allergies indicates:   Allergen Reactions    Opioids - morphine analogues      Objective:     Vital Signs (Most Recent):  Temp: 99 °F (37.2 °C) (07/07/23 0749)  Pulse: 80 (07/07/23 0852)  Resp: 18 (07/07/23 0852)  BP: 125/72 (07/07/23 0749)  SpO2: (!) 92 % (07/07/23 0852) Vital Signs (24h Range):  Temp:  [97.4 °F (36.3 °C)-99.2 °F (37.3 °C)] 99 °F (37.2 °C)  Pulse:  [] 80  Resp:  [15-20] 18  SpO2:  [92  %-97 %] 92 %  BP: (104-134)/(61-72) 125/72     Weight: 110.1 kg (242 lb 11.6 oz)  Body mass index is 43 kg/m².    Intake/Output - Last 3 Shifts         07/05 0700  07/06 0659 07/06 0700  07/07 0659 07/07 0700  07/08 0659    P.O. 720      I.V. (mL/kg)       Total Intake(mL/kg) 720 (6.7)      Urine (mL/kg/hr)       Drains       Total Output       Net +720             Urine Occurrence 2 x      Stool Occurrence 3 x               Physical Exam  Vitals reviewed.   Constitutional:       Appearance: She is well-developed. She is obese.   HENT:      Head: Normocephalic.   Eyes:      Pupils: Pupils are equal, round, and reactive to light.   Neck:      Thyroid: No thyromegaly.      Vascular: No JVD.      Trachea: No tracheal deviation.   Cardiovascular:      Rate and Rhythm: Normal rate and regular rhythm.      Heart sounds: Normal heart sounds.   Pulmonary:      Breath sounds: Normal breath sounds. No wheezing.   Abdominal:      General: Bowel sounds are normal. There is no distension.      Palpations: Abdomen is soft. Abdomen is not rigid. There is no mass.      Tenderness: There is no abdominal tenderness (Minimal incision). There is no guarding or rebound.      Comments: Obese.  Mild erythema around the open edge of the incision.  No drainage.  Wound is clean.   Musculoskeletal:         General: Normal range of motion.      Right lower leg: No edema.      Left lower leg: No edema.   Lymphadenopathy:      Cervical: No cervical adenopathy.   Skin:     General: Skin is warm and dry.      Capillary Refill: Capillary refill takes less than 2 seconds.      Findings: No erythema or rash.   Neurological:      Mental Status: She is alert and oriented to person, place, and time.   Psychiatric:         Mood and Affect: Mood normal.         Behavior: Behavior normal.         Thought Content: Thought content normal.         Judgment: Judgment normal.        Significant Labs:  I have reviewed all pertinent lab results within the past 24  hours.  CBC:   Recent Labs   Lab 07/03/23  0553   WBC 10.99  10.99   RBC 4.19  4.19   HGB 11.0*  11.0*   HCT 36.2*  36.2*     288   MCV 86  86   MCH 26.3*  26.3*   MCHC 30.4*  30.4*     BMP:   Recent Labs   Lab 07/03/23  0553   GLU 83  83     138   K 3.8  3.8     100   CO2 28  28   BUN 12  12   CREATININE 0.7  0.7   CALCIUM 8.4*  8.4*       Significant Diagnostics:  I have reviewed all pertinent imaging results/findings within the past 24 hours.  No new    Assessment/Plan:     * Incarcerated ventral hernia  S/p Open incisional hernia repair with mesh, partial omentectomy 06/20/2023   Regular diet   Supplemental oxygen is required.      Loose stool likely from the omentum and should resolve once it is discontinued.    Accepted to skilled nursing, awaiting transfer    Small seroma of the wound    Will need follow up with surgery in 3 weeks    HARSHAD (acute kidney injury)  Treatment per hospital medicine , resolved    Aspiration pneumonia  Treatment per hospital medicine     Continue Augmentin    Intractable vomiting with nausea  Resolved    Acute hypoxemic respiratory failure  Treatment per hospital medicine , improving  Future need home oxygen to be determined by the skilled nursing.  Will need discharged on supplemental oxygen    BMI 35.0-35.9,adult  Encouraged weight loss    Can be transferred to a skilled nursing facility    Marcus Hutchinson MD  General Surgery  [facility] - Telemetry ([facility])

## 2023-07-07 NOTE — PROGRESS NOTES
[facility] - Telemetry ([facility])  Adult Nutrition  Progress Note    SUMMARY       Recommendations    Recommendation/Intervention:   1. Recommend pt continue on Regular diet as medically appropriate   2. Reommend Joey BID x 2 weeks for wound healing   3. Recommend Banatrol (Banana flakes) BID to assist with loose stools   4. Encourage supplement intake   5. Weekly weights    Goals:   1. Pt will continue to tolerate and consume >75% est EEN/EPN by RD follow up   2. Pt will consume Joey BID prior to RD follow up   3. Pt loose stools will improve by RD follow up  Nutrition Goal Status: new  Communication of RD Recs: other (comment); (POC, sticky note)    Assessment and Plan    Nutrition Problem  Altered GI function   Increased protein needs     Related to (etiology):   Alteration in GI tract structure and/or function   Increased demand for nutrition     Signs and Symptoms (as evidenced by):   Loose stools   Surgery/ Delayed wound healing     Interventions(treatment strategy):  1. General Healthful diet  2. Wound healing medical food therapy supplement   3. Commercial food  4. Collaboration of care with medical providers     Nutrition Diagnosis Status:   New      Malnutrition Assessment     Skin (Micronutrient): bruised, edema, wounds unhealed       Fluid Accumulation (Malnutrition):  (1+ trace)                         Reason for Assessment    Reason For Assessment: length of stay  Diagnosis:  (Incarcerated ventral hernia)  Relevant Medical History: Obesity, Acute hypoxemic respiratory failure, Intractable vomiting w/ nausea, Aspiration pneumonia, HARSHAD, Depression/Anxiety  Hx: S/p open incisional hernia repair 6/20/23  General Information Comments:   7/7/23: 58 y.o. Female admitted for Incarcerated ventral hernia. Pt presented to ED with a 5 day history of bulging at the umbilicus, became red/tender and painful. PTA pt had nothing to eat and drink for a few days d/t associated nausea and vomiting. Note HARSHAD and Intractable  "nausea and vomiting resolved, pt c/o loose stools, S/p open incisional hernia repair 6/20/23. Visited pt at bedside, pt was laying in bed crying, when asked how her appetite is and if she is experiencing any N/V/D she stated "it's fine", when asked if I could do anything for her she stated "it doesn't matter, no one is coming, no one cares", informed her that I would let her nurse know she needed her, pt stated "do whatever you have to do". Spoke to RN who was the pt's nurse yesterday, she reported that she beleives the pt's depression is causing her to become upset when no one is in the room with her, informed her the pt needed her RN, she stated she would let her know. Pt appeared well nourished, no NFPE warranted at this time. Reviewed chart: % PO intake: 100%; LBM 7/6; Skin: bruised, incision abdomen WDL; Altered skin: L medial lower quadrant; Kaushik score: 19 (no risk); Edema: 1+ trace generalized. Labs, meds, weight reviewed. Labs 7/3: Calcium (L). Weight charted 7/6 242 lbs (BMI 43, Morbid obesity), no previous weights charted. RD will continue to monitor.  Nutrition Discharge Planning: General Healthful diet    Nutrition Risk Screen    Nutrition Risk Screen: no indicators present    Nutrition/Diet History    Spiritual, Cultural Beliefs, Jehovah's witness Practices, Values that Affect Care: no  Food Allergies: NKFA  Factors Affecting Nutritional Intake: depression, diarrhea    Anthropometrics    Temp: 98.9 °F (37.2 °C)  Height Method: Stated  Height: 5' 3" (160 cm)  Height (inches): 63 in  Weight Method: Bed Scale  Weight: 110.1 kg (242 lb 11.6 oz)  Weight (lb): 242.73 lb  Ideal Body Weight (IBW), Female: 115 lb  % Ideal Body Weight, Female (lb): 211.07 %  BMI (Calculated): 43  BMI Grade: greater than 40 - morbid obesity     Wt Readings from Last 15 Encounters:   07/07/23 110.1 kg (242 lb 11.6 oz)   06/25/16 83.9 kg (185 lb)   05/19/15 72.6 kg (160 lb)   04/19/14 78.5 kg (173 lb)   03/23/14 77.1 kg (170 lb) "   08/06/13 84.2 kg (185 lb 9.6 oz)     Lab/Procedures/Meds    Pertinent Labs Reviewed: reviewed  Pertinent Labs Comments: Calcium (L)  Pertinent Medications Reviewed: reviewed  Pertinent Medications Comments: sucralfate, gabapentin, fluoxetine, famotidine, Abx, alprazolam  BMP  Lab Results   Component Value Date     07/03/2023     07/03/2023    K 3.8 07/03/2023    K 3.8 07/03/2023     07/03/2023     07/03/2023    CO2 28 07/03/2023    CO2 28 07/03/2023    BUN 12 07/03/2023    BUN 12 07/03/2023    CREATININE 0.7 07/03/2023    CREATININE 0.7 07/03/2023    CALCIUM 8.4 (L) 07/03/2023    CALCIUM 8.4 (L) 07/03/2023    ANIONGAP 10 07/03/2023    ANIONGAP 10 07/03/2023    EGFRNORACEVR >60 07/03/2023    EGFRNORACEVR >60 07/03/2023     Scheduled Meds:   albuterol-ipratropium  3 mL Nebulization Q6H    ALPRAZolam  0.25 mg Oral TID    amoxicillin-clavulanate 875-125mg  1 tablet Oral Q12H    famotidine  20 mg Oral BID    FLUoxetine  40 mg Oral Daily    fluticasone propionate  2 spray Each Nostril Daily    gabapentin  300 mg Oral TID    guaiFENesin  1,200 mg Oral BID    meloxicam  15 mg Oral Daily    montelukast  10 mg Oral QHS    sucralfate  1 g Oral Q6H     Continuous Infusions:  PRN Meds:.aluminum-magnesium hydroxide-simethicone, diphenhydrAMINE, HYDROcodone-acetaminophen, HYDROcodone-acetaminophen, ondansetron    Physical Findings/Assessment         Estimated/Assessed Needs    Weight Used For Calorie Calculations: 110.1 kg (242 lb 11.6 oz)  Energy Calorie Requirements (kcal): 1645 kcals (MSJ (Obese BMI (43))  Energy Need Method: [place]  Protein Requirements: 53-67 g (0.8-1.0 g/kg ABW (Obese, 211.07% IBW)  Weight Used For Protein Calculations: 66.7 kg (147 lb 0.8 oz) (Adj BW)  Fluid Requirements (mL): 1645 mL (1 mL/kcal)  Estimated Fluid Requirement Method: RDA Method  RDA Method (mL): 1645  CHO Requirement: 206 (1645 kcals/8)      Nutrition Prescription Ordered    Current Diet Order: Regular  diet    Evaluation of Received Nutrient/Fluid Intake  I/O: (Net since admit)  7/7: +4979.1 mL    Energy Calories Required: meeting needs  Protein Required: meeting needs  Fluid Required: not meeting needs  Tolerance: tolerating  % Intake of Estimated Energy Needs: 75 - 100 %  % Meal Intake: 75 - 100 %    Nutrition Risk    Level of Risk/Frequency of Follow-up: low (F/u x 1 weekly)     Monitor and Evaluation    Food and Nutrient Intake: energy intake, food and beverage intake  Food and Nutrient Adminstration: diet order  Knowledge/Beliefs/Attitudes: food and nutrition knowledge/skill, beliefs and attitudes  Anthropometric Measurements: weight, weight change, body mass index  Biochemical Data, Medical Tests and Procedures: inflammatory profile     Nutrition Follow-Up    RD Follow-up?: Yes  Lacie Haynes, Registration Eligible, Provisional LDN

## 2023-07-07 NOTE — PLAN OF CARE
[facility] - Telemetry (Hospital)  Discharge Final Note    Primary Care Provider: Lali Hatch MD    Expected Discharge Date: 7/6/2023    Final Discharge Note (most recent)       Final Note - 07/07/23 1542          Final Note    Assessment Type Final Discharge Note (P)      Anticipated Discharge Disposition Skilled Nursing Facility (P)         Post-Acute Status    Post-Acute Authorization Placement (P)      Post-Acute Placement Status Set-up Complete/Auth obtained (P)    [facility]                    Important Message from Medicare              Follow-up providers       Marcus Hutchinson MD   Specialty: General Surgery    [address]  [place] [address]   Phone: [phone]       Next Steps: Follow up in 3 week(s)    Instructions: post op appt    The office will contact the patient for a follow-up appointment              After-discharge care                Durable Medical Equipment       [facility]   Service: Durable Medical Equipment    [address] [place] [id number]   Phone: [phone]

## 2023-07-07 NOTE — PT/OT/SLP PROGRESS
"Physical Therapy      Patient Name:  Hamzah iNcolas   MRN:  [id number]    ATTEMPTED P.T. TX THIS AM, PT DECLINED TX. DUE TO C/O FEELING POORLY, "I JUST GOT OUT OF THE SHOWER AND I FEEL CLAMMY"  PT ENCOURAGED OOB LATER WITH STAFF, WILL CONTINUE EFFORTS    "

## 2023-07-07 NOTE — PT/OT/SLP PROGRESS
"Occupational Therapy      Patient Name:  Hamzah Nicolas   MRN:  [id number]    Attempted OT tx this morning at 1035. Pt declined tx with reports of feeling unwell, stating "just got out of the shower and I feel clammy." Pt was encouraged to spend time OOB with staff once feeling better. Pt reported understanding. Will attempt again next visit.    Angie PARKER, LOTR        7/7/2023  "

## 2023-07-31 ENCOUNTER — OFFICE VISIT (OUTPATIENT)
Dept: SURGERY | Facility: CLINIC | Age: 58
End: 2023-07-31
Payer: MEDICAID

## 2023-07-31 VITALS
SYSTOLIC BLOOD PRESSURE: 133 MMHG | HEART RATE: 67 BPM | TEMPERATURE: 98 F | HEIGHT: 63 IN | WEIGHT: 247.56 LBS | DIASTOLIC BLOOD PRESSURE: 87 MMHG | BODY MASS INDEX: 43.86 KG/M2

## 2023-07-31 DIAGNOSIS — F32.A DEPRESSION, UNSPECIFIED DEPRESSION TYPE: ICD-10-CM

## 2023-07-31 PROCEDURE — 3075F SYST BP GE 130 - 139MM HG: CPT | Mod: CPTII,,, | Performed by: SURGERY

## 2023-07-31 PROCEDURE — 99999 PR PBB SHADOW E&M-EST. PATIENT-LVL III: CPT | Mod: PBBFAC,,, | Performed by: SURGERY

## 2023-07-31 PROCEDURE — 1159F MED LIST DOCD IN RCRD: CPT | Mod: CPTII,,, | Performed by: SURGERY

## 2023-07-31 PROCEDURE — 99212 OFFICE O/P EST SF 10 MIN: CPT | Mod: S$PBB,,, | Performed by: SURGERY

## 2023-07-31 PROCEDURE — 1159F PR MEDICATION LIST DOCUMENTED IN MEDICAL RECORD: ICD-10-PCS | Mod: CPTII,,, | Performed by: SURGERY

## 2023-07-31 PROCEDURE — 3008F BODY MASS INDEX DOCD: CPT | Mod: CPTII,,, | Performed by: SURGERY

## 2023-07-31 PROCEDURE — 99212 PR OFFICE/OUTPT VISIT, EST, LEVL II, 10-19 MIN: ICD-10-PCS | Mod: S$PBB,,, | Performed by: SURGERY

## 2023-07-31 PROCEDURE — 99213 OFFICE O/P EST LOW 20 MIN: CPT | Mod: PBBFAC | Performed by: SURGERY

## 2023-07-31 PROCEDURE — 1111F DSCHRG MED/CURRENT MED MERGE: CPT | Mod: CPTII,,, | Performed by: SURGERY

## 2023-07-31 PROCEDURE — 3079F DIAST BP 80-89 MM HG: CPT | Mod: CPTII,,, | Performed by: SURGERY

## 2023-07-31 PROCEDURE — 1111F PR DISCHARGE MEDS RECONCILED W/ CURRENT OUTPATIENT MED LIST: ICD-10-PCS | Mod: CPTII,,, | Performed by: SURGERY

## 2023-07-31 PROCEDURE — 99999 PR PBB SHADOW E&M-EST. PATIENT-LVL III: ICD-10-PCS | Mod: PBBFAC,,, | Performed by: SURGERY

## 2023-07-31 PROCEDURE — 3079F PR MOST RECENT DIASTOLIC BLOOD PRESSURE 80-89 MM HG: ICD-10-PCS | Mod: CPTII,,, | Performed by: SURGERY

## 2023-07-31 PROCEDURE — 3008F PR BODY MASS INDEX (BMI) DOCUMENTED: ICD-10-PCS | Mod: CPTII,,, | Performed by: SURGERY

## 2023-07-31 PROCEDURE — 3075F PR MOST RECENT SYSTOLIC BLOOD PRESS GE 130-139MM HG: ICD-10-PCS | Mod: CPTII,,, | Performed by: SURGERY

## 2023-07-31 NOTE — PROGRESS NOTES
Subjective:       Patient ID: Hamzah Nicolas is a 58 y.o. female.    Chief Complaint:  Urgent repair of incarcerated umbilical/ventral hernia  58-year-old female who underwent urgent repair of an umbilical/ventral hernia.  She had resection of the incarcerated omentum.  She also had an aspiration pneumonia.      The patient is currently at a skilled nursing facility.  She states she is no longer using a wheelchair.  She is ambulating with a walker.  She is tolerating a diet.  She is no abdominal pain.            Review of Systems   Respiratory:  Shortness of breath: Mild with activity.    Musculoskeletal:  Positive for arthralgias.       Objective:      Physical Exam  Vitals reviewed.   Cardiovascular:      Rate and Rhythm: Normal rate.   Pulmonary:      Effort: Pulmonary effort is normal.      Breath sounds: Normal breath sounds.   Abdominal:      General: Bowel sounds are normal.      Palpations: Abdomen is soft.      Comments: Incision is clean except for superiorly and inferiorly where there is a slight gapping of the skin.  A protruding suture was removed   Neurological:      Mental Status: She is alert.          Final Pathologic Diagnosis Omentum, resection:   - Benign fibroadipose tissue with congestion reactive changes consistent with omentum.    Comment: Interp By Ira Elizabeth MD, Signed on 07/03/2023 at 15:14      Notes from the skilled nursing facility were reviewed  Assessment:     Patient was covering well after repair of a incarcerated ventral hernia and aspiration pneumonia.  Her incision is healing as expected.  There was no evidence of a hematoma or seroma    Plan:       Continue physical therapy per the skilled nursing facility.      Antibiotic ointment to the incision twice a day for 10 days.      Discharge from the skilled nursing facility per their physicians.      Follow up with General surgery at [facility] and three-month

## 2023-07-31 NOTE — PATIENT INSTRUCTIONS
No lifting more than 20 lb until September 1, 2023.      We need to see you back in 3 months to check on the incision.      You are at an increased risk for recurrence of the hernia due to her size.      If you have any questions or concerns please call the office    Our office phone numbers are  [phone] and

## 2024-03-01 NOTE — PLAN OF CARE
Problem: Adult Inpatient Plan of Care  Goal: Plan of Care Review  Outcome: Ongoing, Progressing  Goal: Patient-Specific Goal (Individualized)  Outcome: Ongoing, Progressing  Goal: Absence of Hospital-Acquired Illness or Injury  Outcome: Ongoing, Progressing  Goal: Optimal Comfort and Wellbeing  Outcome: Ongoing, Progressing  Goal: Readiness for Transition of Care  Outcome: Ongoing, Progressing   Pt is AAOx4 , Pt on 10 liters  of high flow nasal canula with continuous pulse oxymetry in place, pt sinus tach on monitor. Pt has a NPO/small sips of water with meds diet, tolerating well, SCD in place and operating.  POC reviewed with pt at bedside. Pt turns independently  in bed. Bed is locked in lowest position, side rails up x2, bed alarm set, call light/personal items within reach, pt instructed to call staff for assistance with mobility. Pt remained free from any falls the entire shift.        ----- Message from Coty Castro sent at 3/1/2024  8:09 AM EST -----  Subject: Medication Problem     Medication: blood glucose test strips (ASCENSIA AUTODISC VI;ONE TOUCH   ULTRA TEST VI) strip  Dosage: 1 each by In Vitro route daily As needed.  Ordering Provider: ONESIMO FRENCH    Question/Problem: pt went to get a refill and they told him that he was   needing a whole new meter since his is so old. Pt is needing a new meter   and test strips.       Pharmacy: [facility] - [facility] [address], [phone] - P [phone] - F [phone]    ---------------------------------------------------------------------------  --------------  CALL BACK INFO  [phone]; OK to leave message on voicemail  ---------------------------------------------------------------------------  --------------    SCRIPT ANSWERS  Relationship to Patient: Self

## (undated) DEVICE — ADHESIVE DERMABOND ADVANCED

## (undated) DEVICE — DRESSING TRANS 4X4 TEGADERM

## (undated) DEVICE — EVACUATOR WOUND BULB 100CC

## (undated) DEVICE — ELECTRODE REM PLYHSV RETURN 9

## (undated) DEVICE — GOWN POLY REINF BRTH SLV XL

## (undated) DEVICE — COVER LIGHT HANDLE 80/CA

## (undated) DEVICE — NDL HYPO SAFETY 25GX1.5IN

## (undated) DEVICE — SUT PDS II 0 CT-1 VIL MONO

## (undated) DEVICE — TOWEL OR DISP STRL BLUE 4/PK

## (undated) DEVICE — SUPPORT ULNA NERVE PROTECTOR

## (undated) DEVICE — SUT MONOCRYL 4-0 PS-1 UND

## (undated) DEVICE — SUT NUROLON 1 CTX C550D

## (undated) DEVICE — SUT VICRYL 3-0 27 SH

## (undated) DEVICE — PACK BASIC SETUP SC BR

## (undated) DEVICE — DRAPE T TRNSVRS LAP 102X78X121

## (undated) DEVICE — Device

## (undated) DEVICE — GLOVE SURG BIOGEL LATEX SZ 7.5

## (undated) DEVICE — MANIFOLD 4 PORT

## (undated) DEVICE — SPONGE LAP 18X18 PREWASHED

## (undated) DEVICE — SOL NACL IRR 1000ML BTL

## (undated) DEVICE — SYR 10CC LUER LOCK

## (undated) DEVICE — DEVICE ENSEAL X1 LARGE JAW

## (undated) DEVICE — NDL HYPODERMIC BLUNT 18G 1.5IN

## (undated) DEVICE — APPLICATOR CHLORAPREP ORN 26ML

## (undated) DEVICE — TRAY CATH FOL SIL URIMTR 16FR

## (undated) DEVICE — BINDER ABDOMINAL 10IN 27-48IN

## (undated) DEVICE — SUT VICRYL 2-0 27 CT-1